# Patient Record
Sex: MALE | Race: WHITE | NOT HISPANIC OR LATINO | URBAN - METROPOLITAN AREA
[De-identification: names, ages, dates, MRNs, and addresses within clinical notes are randomized per-mention and may not be internally consistent; named-entity substitution may affect disease eponyms.]

---

## 2018-01-05 PROBLEM — Z00.00 ENCOUNTER FOR PREVENTIVE HEALTH EXAMINATION: Status: ACTIVE | Noted: 2018-01-05

## 2018-01-16 ENCOUNTER — OUTPATIENT (OUTPATIENT)
Dept: OUTPATIENT SERVICES | Facility: HOSPITAL | Age: 78
LOS: 1 days | End: 2018-01-16
Payer: MEDICARE

## 2018-01-16 VITALS
RESPIRATION RATE: 15 BRPM | DIASTOLIC BLOOD PRESSURE: 86 MMHG | HEART RATE: 72 BPM | WEIGHT: 199.08 LBS | HEIGHT: 68 IN | TEMPERATURE: 98 F | SYSTOLIC BLOOD PRESSURE: 159 MMHG

## 2018-01-16 DIAGNOSIS — Z41.9 ENCOUNTER FOR PROCEDURE FOR PURPOSES OTHER THAN REMEDYING HEALTH STATE, UNSPECIFIED: Chronic | ICD-10-CM

## 2018-01-16 DIAGNOSIS — Z01.818 ENCOUNTER FOR OTHER PREPROCEDURAL EXAMINATION: ICD-10-CM

## 2018-01-16 DIAGNOSIS — M16.11 UNILATERAL PRIMARY OSTEOARTHRITIS, RIGHT HIP: ICD-10-CM

## 2018-01-16 LAB
ALBUMIN SERPL ELPH-MCNC: 4 G/DL — SIGNIFICANT CHANGE UP (ref 3.3–5)
ALP SERPL-CCNC: 82 U/L — SIGNIFICANT CHANGE UP (ref 40–120)
ALT FLD-CCNC: 31 U/L — SIGNIFICANT CHANGE UP (ref 12–78)
ANION GAP SERPL CALC-SCNC: 7 MMOL/L — SIGNIFICANT CHANGE UP (ref 5–17)
APTT BLD: 35.4 SEC — SIGNIFICANT CHANGE UP (ref 27.5–37.4)
AST SERPL-CCNC: 26 U/L — SIGNIFICANT CHANGE UP (ref 15–37)
BILIRUB SERPL-MCNC: 0.5 MG/DL — SIGNIFICANT CHANGE UP (ref 0.2–1.2)
BUN SERPL-MCNC: 14 MG/DL — SIGNIFICANT CHANGE UP (ref 7–23)
CALCIUM SERPL-MCNC: 8.8 MG/DL — SIGNIFICANT CHANGE UP (ref 8.5–10.1)
CHLORIDE SERPL-SCNC: 107 MMOL/L — SIGNIFICANT CHANGE UP (ref 96–108)
CO2 SERPL-SCNC: 25 MMOL/L — SIGNIFICANT CHANGE UP (ref 22–31)
CREAT SERPL-MCNC: 0.85 MG/DL — SIGNIFICANT CHANGE UP (ref 0.5–1.3)
GLUCOSE SERPL-MCNC: 98 MG/DL — SIGNIFICANT CHANGE UP (ref 70–99)
HCT VFR BLD CALC: 34.3 % — LOW (ref 39–50)
HGB BLD-MCNC: 12.1 G/DL — LOW (ref 13–17)
INR BLD: 0.99 RATIO — SIGNIFICANT CHANGE UP (ref 0.88–1.16)
MCHC RBC-ENTMCNC: 33.8 PG — SIGNIFICANT CHANGE UP (ref 27–34)
MCHC RBC-ENTMCNC: 35.2 GM/DL — SIGNIFICANT CHANGE UP (ref 32–36)
MCV RBC AUTO: 96.1 FL — SIGNIFICANT CHANGE UP (ref 80–100)
PLATELET # BLD AUTO: 506 K/UL — HIGH (ref 150–400)
POTASSIUM SERPL-MCNC: 4.1 MMOL/L — SIGNIFICANT CHANGE UP (ref 3.5–5.3)
POTASSIUM SERPL-SCNC: 4.1 MMOL/L — SIGNIFICANT CHANGE UP (ref 3.5–5.3)
PROT SERPL-MCNC: 7.3 G/DL — SIGNIFICANT CHANGE UP (ref 6–8.3)
PROTHROM AB SERPL-ACNC: 10.8 SEC — SIGNIFICANT CHANGE UP (ref 9.8–12.7)
RBC # BLD: 3.57 M/UL — LOW (ref 4.2–5.8)
RBC # FLD: 13.6 % — SIGNIFICANT CHANGE UP (ref 10.3–14.5)
SODIUM SERPL-SCNC: 139 MMOL/L — SIGNIFICANT CHANGE UP (ref 135–145)
WBC # BLD: 5.4 K/UL — SIGNIFICANT CHANGE UP (ref 3.8–10.5)
WBC # FLD AUTO: 5.4 K/UL — SIGNIFICANT CHANGE UP (ref 3.8–10.5)

## 2018-01-16 PROCEDURE — 71046 X-RAY EXAM CHEST 2 VIEWS: CPT

## 2018-01-16 PROCEDURE — 85730 THROMBOPLASTIN TIME PARTIAL: CPT

## 2018-01-16 PROCEDURE — 93010 ELECTROCARDIOGRAM REPORT: CPT

## 2018-01-16 PROCEDURE — 86850 RBC ANTIBODY SCREEN: CPT

## 2018-01-16 PROCEDURE — 85027 COMPLETE CBC AUTOMATED: CPT

## 2018-01-16 PROCEDURE — 71046 X-RAY EXAM CHEST 2 VIEWS: CPT | Mod: 26

## 2018-01-16 PROCEDURE — 93005 ELECTROCARDIOGRAM TRACING: CPT

## 2018-01-16 PROCEDURE — 87641 MR-STAPH DNA AMP PROBE: CPT

## 2018-01-16 PROCEDURE — 86901 BLOOD TYPING SEROLOGIC RH(D): CPT

## 2018-01-16 PROCEDURE — 72190 X-RAY EXAM OF PELVIS: CPT

## 2018-01-16 PROCEDURE — G0463: CPT

## 2018-01-16 PROCEDURE — 86900 BLOOD TYPING SEROLOGIC ABO: CPT

## 2018-01-16 PROCEDURE — 73502 X-RAY EXAM HIP UNI 2-3 VIEWS: CPT

## 2018-01-16 PROCEDURE — 85610 PROTHROMBIN TIME: CPT

## 2018-01-16 PROCEDURE — 80053 COMPREHEN METABOLIC PANEL: CPT

## 2018-01-16 PROCEDURE — 87640 STAPH A DNA AMP PROBE: CPT

## 2018-01-16 PROCEDURE — 73502 X-RAY EXAM HIP UNI 2-3 VIEWS: CPT | Mod: 26,RT

## 2018-01-16 NOTE — H&P PST ADULT - PSH
Elective surgery  bilat cataract ext 2016  Elective surgery  bilat knee arthroscopy   1990's  Elective surgery  hernia repair  1990  Elective surgery  left shoulder 2000  Elective surgery  laminectomy  fusion   L5-S1   2017

## 2018-01-16 NOTE — H&P PST ADULT - NSANTHOSAYNRD_GEN_A_CORE
No. MOLINA screening performed.  STOP BANG Legend: 0-2 = LOW Risk; 3-4 = INTERMEDIATE Risk; 5-8 = HIGH Risk

## 2018-01-16 NOTE — H&P PST ADULT - PMH
Arthritis    Back pain    Colitis    Hiatal hernia    HTN (hypertension)    Hypercholesteremia    Hypothyroid    Reflux esophagitis

## 2018-01-17 LAB
MRSA PCR RESULT.: SIGNIFICANT CHANGE UP
S AUREUS DNA NOSE QL NAA+PROBE: DETECTED

## 2018-01-17 RX ORDER — MUPIROCIN 20 MG/G
1 OINTMENT TOPICAL
Qty: 1 | Refills: 0 | OUTPATIENT
Start: 2018-01-17 | End: 2018-01-21

## 2018-01-19 RX ORDER — SODIUM CHLORIDE 9 MG/ML
1000 INJECTION, SOLUTION INTRAVENOUS
Qty: 0 | Refills: 0 | Status: DISCONTINUED | OUTPATIENT
Start: 2018-01-22 | End: 2018-01-22

## 2018-01-22 ENCOUNTER — RESULT REVIEW (OUTPATIENT)
Age: 78
End: 2018-01-22

## 2018-01-22 ENCOUNTER — TRANSCRIPTION ENCOUNTER (OUTPATIENT)
Age: 78
End: 2018-01-22

## 2018-01-22 ENCOUNTER — INPATIENT (INPATIENT)
Facility: HOSPITAL | Age: 78
LOS: 1 days | Discharge: HOME | DRG: 470 | End: 2018-01-24
Attending: ORTHOPAEDIC SURGERY | Admitting: ORTHOPAEDIC SURGERY
Payer: MEDICARE

## 2018-01-22 VITALS
DIASTOLIC BLOOD PRESSURE: 87 MMHG | SYSTOLIC BLOOD PRESSURE: 143 MMHG | OXYGEN SATURATION: 98 % | TEMPERATURE: 98 F | WEIGHT: 199.08 LBS | HEIGHT: 68 IN | HEART RATE: 67 BPM | RESPIRATION RATE: 14 BRPM

## 2018-01-22 DIAGNOSIS — K52.9 NONINFECTIVE GASTROENTERITIS AND COLITIS, UNSPECIFIED: ICD-10-CM

## 2018-01-22 DIAGNOSIS — K21.0 GASTRO-ESOPHAGEAL REFLUX DISEASE WITH ESOPHAGITIS: ICD-10-CM

## 2018-01-22 DIAGNOSIS — Z41.9 ENCOUNTER FOR PROCEDURE FOR PURPOSES OTHER THAN REMEDYING HEALTH STATE, UNSPECIFIED: Chronic | ICD-10-CM

## 2018-01-22 DIAGNOSIS — E03.9 HYPOTHYROIDISM, UNSPECIFIED: ICD-10-CM

## 2018-01-22 DIAGNOSIS — I10 ESSENTIAL (PRIMARY) HYPERTENSION: ICD-10-CM

## 2018-01-22 DIAGNOSIS — M19.90 UNSPECIFIED OSTEOARTHRITIS, UNSPECIFIED SITE: ICD-10-CM

## 2018-01-22 DIAGNOSIS — M16.11 UNILATERAL PRIMARY OSTEOARTHRITIS, RIGHT HIP: ICD-10-CM

## 2018-01-22 LAB
ABO RH CONFIRMATION: SIGNIFICANT CHANGE UP
HCT VFR BLD CALC: 30.4 % — LOW (ref 39–50)
HGB BLD-MCNC: 10 G/DL — LOW (ref 13–17)

## 2018-01-22 PROCEDURE — 88305 TISSUE EXAM BY PATHOLOGIST: CPT | Mod: 26

## 2018-01-22 PROCEDURE — 73501 X-RAY EXAM HIP UNI 1 VIEW: CPT | Mod: 26,RT

## 2018-01-22 PROCEDURE — 88311 DECALCIFY TISSUE: CPT | Mod: 26

## 2018-01-22 RX ORDER — ACETAMINOPHEN 500 MG
1000 TABLET ORAL ONCE
Qty: 0 | Refills: 0 | Status: COMPLETED | OUTPATIENT
Start: 2018-01-22 | End: 2018-01-22

## 2018-01-22 RX ORDER — FOLIC ACID 0.8 MG
1 TABLET ORAL DAILY
Qty: 0 | Refills: 0 | Status: DISCONTINUED | OUTPATIENT
Start: 2018-01-22 | End: 2018-01-24

## 2018-01-22 RX ORDER — LEVOTHYROXINE SODIUM 125 MCG
25 TABLET ORAL DAILY
Qty: 0 | Refills: 0 | Status: DISCONTINUED | OUTPATIENT
Start: 2018-01-22 | End: 2018-01-24

## 2018-01-22 RX ORDER — HYDROMORPHONE HYDROCHLORIDE 2 MG/ML
4 INJECTION INTRAMUSCULAR; INTRAVENOUS; SUBCUTANEOUS EVERY 4 HOURS
Qty: 0 | Refills: 0 | Status: DISCONTINUED | OUTPATIENT
Start: 2018-01-22 | End: 2018-01-24

## 2018-01-22 RX ORDER — CELECOXIB 200 MG/1
200 CAPSULE ORAL
Qty: 0 | Refills: 0 | Status: DISCONTINUED | OUTPATIENT
Start: 2018-01-22 | End: 2018-01-24

## 2018-01-22 RX ORDER — HYDROMORPHONE HYDROCHLORIDE 2 MG/ML
2 INJECTION INTRAMUSCULAR; INTRAVENOUS; SUBCUTANEOUS EVERY 4 HOURS
Qty: 0 | Refills: 0 | Status: DISCONTINUED | OUTPATIENT
Start: 2018-01-22 | End: 2018-01-24

## 2018-01-22 RX ORDER — LOSARTAN POTASSIUM 100 MG/1
100 TABLET, FILM COATED ORAL DAILY
Qty: 0 | Refills: 0 | Status: DISCONTINUED | OUTPATIENT
Start: 2018-01-22 | End: 2018-01-24

## 2018-01-22 RX ORDER — DOCUSATE SODIUM 100 MG
100 CAPSULE ORAL THREE TIMES A DAY
Qty: 0 | Refills: 0 | Status: DISCONTINUED | OUTPATIENT
Start: 2018-01-22 | End: 2018-01-24

## 2018-01-22 RX ORDER — AMLODIPINE BESYLATE 2.5 MG/1
10 TABLET ORAL DAILY
Qty: 0 | Refills: 0 | Status: DISCONTINUED | OUTPATIENT
Start: 2018-01-22 | End: 2018-01-24

## 2018-01-22 RX ORDER — BUPROPION HYDROCHLORIDE 150 MG/1
200 TABLET, EXTENDED RELEASE ORAL DAILY
Qty: 0 | Refills: 0 | Status: DISCONTINUED | OUTPATIENT
Start: 2018-01-22 | End: 2018-01-22

## 2018-01-22 RX ORDER — BUPROPION HYDROCHLORIDE 150 MG/1
100 TABLET, EXTENDED RELEASE ORAL
Qty: 0 | Refills: 0 | Status: DISCONTINUED | OUTPATIENT
Start: 2018-01-22 | End: 2018-01-24

## 2018-01-22 RX ORDER — FENOFIBRATE,MICRONIZED 130 MG
145 CAPSULE ORAL DAILY
Qty: 0 | Refills: 0 | Status: DISCONTINUED | OUTPATIENT
Start: 2018-01-22 | End: 2018-01-24

## 2018-01-22 RX ORDER — FERROUS SULFATE 325(65) MG
325 TABLET ORAL
Qty: 0 | Refills: 0 | Status: DISCONTINUED | OUTPATIENT
Start: 2018-01-22 | End: 2018-01-24

## 2018-01-22 RX ORDER — HYDROMORPHONE HYDROCHLORIDE 2 MG/ML
0.5 INJECTION INTRAMUSCULAR; INTRAVENOUS; SUBCUTANEOUS
Qty: 0 | Refills: 0 | Status: DISCONTINUED | OUTPATIENT
Start: 2018-01-22 | End: 2018-01-22

## 2018-01-22 RX ORDER — MERCAPTOPURINE 50 MG/1
100 TABLET ORAL DAILY
Qty: 0 | Refills: 0 | Status: COMPLETED | OUTPATIENT
Start: 2018-01-22 | End: 2018-01-22

## 2018-01-22 RX ORDER — ASPIRIN/CALCIUM CARB/MAGNESIUM 324 MG
325 TABLET ORAL
Qty: 0 | Refills: 0 | Status: DISCONTINUED | OUTPATIENT
Start: 2018-01-22 | End: 2018-01-24

## 2018-01-22 RX ORDER — SODIUM CHLORIDE 9 MG/ML
1000 INJECTION, SOLUTION INTRAVENOUS
Qty: 0 | Refills: 0 | Status: DISCONTINUED | OUTPATIENT
Start: 2018-01-22 | End: 2018-01-23

## 2018-01-22 RX ORDER — PANTOPRAZOLE SODIUM 20 MG/1
40 TABLET, DELAYED RELEASE ORAL DAILY
Qty: 0 | Refills: 0 | Status: DISCONTINUED | OUTPATIENT
Start: 2018-01-22 | End: 2018-01-24

## 2018-01-22 RX ORDER — MORPHINE SULFATE 50 MG/1
2 CAPSULE, EXTENDED RELEASE ORAL EVERY 4 HOURS
Qty: 0 | Refills: 0 | Status: DISCONTINUED | OUTPATIENT
Start: 2018-01-22 | End: 2018-01-24

## 2018-01-22 RX ORDER — ONDANSETRON 8 MG/1
4 TABLET, FILM COATED ORAL EVERY 6 HOURS
Qty: 0 | Refills: 0 | Status: DISCONTINUED | OUTPATIENT
Start: 2018-01-22 | End: 2018-01-24

## 2018-01-22 RX ORDER — CEFAZOLIN SODIUM 1 G
2000 VIAL (EA) INJECTION ONCE
Qty: 0 | Refills: 0 | Status: COMPLETED | OUTPATIENT
Start: 2018-01-22 | End: 2018-01-22

## 2018-01-22 RX ORDER — ACETAMINOPHEN 500 MG
1000 TABLET ORAL ONCE
Qty: 0 | Refills: 0 | Status: COMPLETED | OUTPATIENT
Start: 2018-01-23 | End: 2018-01-23

## 2018-01-22 RX ORDER — ASCORBIC ACID 60 MG
500 TABLET,CHEWABLE ORAL
Qty: 0 | Refills: 0 | Status: DISCONTINUED | OUTPATIENT
Start: 2018-01-22 | End: 2018-01-24

## 2018-01-22 RX ORDER — SODIUM CHLORIDE 9 MG/ML
1000 INJECTION, SOLUTION INTRAVENOUS
Qty: 0 | Refills: 0 | Status: DISCONTINUED | OUTPATIENT
Start: 2018-01-22 | End: 2018-01-22

## 2018-01-22 RX ORDER — CEFAZOLIN SODIUM 1 G
2000 VIAL (EA) INJECTION EVERY 8 HOURS
Qty: 0 | Refills: 0 | Status: COMPLETED | OUTPATIENT
Start: 2018-01-22 | End: 2018-01-23

## 2018-01-22 RX ORDER — BUPIVACAINE 13.3 MG/ML
20 INJECTION, SUSPENSION, LIPOSOMAL INFILTRATION ONCE
Qty: 0 | Refills: 0 | Status: DISCONTINUED | OUTPATIENT
Start: 2018-01-22 | End: 2018-01-22

## 2018-01-22 RX ORDER — ACETAMINOPHEN 500 MG
650 TABLET ORAL EVERY 8 HOURS
Qty: 0 | Refills: 0 | Status: DISCONTINUED | OUTPATIENT
Start: 2018-01-23 | End: 2018-01-24

## 2018-01-22 RX ADMIN — HYDROMORPHONE HYDROCHLORIDE 0.5 MILLIGRAM(S): 2 INJECTION INTRAMUSCULAR; INTRAVENOUS; SUBCUTANEOUS at 13:19

## 2018-01-22 RX ADMIN — ONDANSETRON 4 MILLIGRAM(S): 8 TABLET, FILM COATED ORAL at 17:23

## 2018-01-22 RX ADMIN — Medication 100 MILLIGRAM(S): at 17:23

## 2018-01-22 RX ADMIN — SODIUM CHLORIDE 100 MILLILITER(S): 9 INJECTION, SOLUTION INTRAVENOUS at 13:04

## 2018-01-22 RX ADMIN — HYDROMORPHONE HYDROCHLORIDE 4 MILLIGRAM(S): 2 INJECTION INTRAMUSCULAR; INTRAVENOUS; SUBCUTANEOUS at 20:50

## 2018-01-22 RX ADMIN — HYDROMORPHONE HYDROCHLORIDE 0.5 MILLIGRAM(S): 2 INJECTION INTRAMUSCULAR; INTRAVENOUS; SUBCUTANEOUS at 12:41

## 2018-01-22 RX ADMIN — CELECOXIB 200 MILLIGRAM(S): 200 CAPSULE ORAL at 19:54

## 2018-01-22 RX ADMIN — HYDROMORPHONE HYDROCHLORIDE 0.5 MILLIGRAM(S): 2 INJECTION INTRAMUSCULAR; INTRAVENOUS; SUBCUTANEOUS at 13:09

## 2018-01-22 RX ADMIN — HYDROMORPHONE HYDROCHLORIDE 4 MILLIGRAM(S): 2 INJECTION INTRAMUSCULAR; INTRAVENOUS; SUBCUTANEOUS at 20:05

## 2018-01-22 RX ADMIN — Medication 100 MILLIGRAM(S): at 22:05

## 2018-01-22 RX ADMIN — HYDROMORPHONE HYDROCHLORIDE 0.5 MILLIGRAM(S): 2 INJECTION INTRAMUSCULAR; INTRAVENOUS; SUBCUTANEOUS at 12:53

## 2018-01-22 RX ADMIN — SODIUM CHLORIDE 75 MILLILITER(S): 9 INJECTION, SOLUTION INTRAVENOUS at 14:44

## 2018-01-22 RX ADMIN — Medication 400 MILLIGRAM(S): at 18:31

## 2018-01-22 RX ADMIN — Medication 1000 MILLIGRAM(S): at 19:54

## 2018-01-22 RX ADMIN — SODIUM CHLORIDE 75 MILLILITER(S): 9 INJECTION, SOLUTION INTRAVENOUS at 09:14

## 2018-01-22 RX ADMIN — Medication 325 MILLIGRAM(S): at 18:23

## 2018-01-22 RX ADMIN — CELECOXIB 200 MILLIGRAM(S): 200 CAPSULE ORAL at 18:23

## 2018-01-22 NOTE — BRIEF OPERATIVE NOTE - PROCEDURE
<<-----Click on this checkbox to enter Procedure RAMAKRISHNA (total hip arthroplasty)  01/22/2018    Active  [id number]

## 2018-01-22 NOTE — PROGRESS NOTE ADULT - ASSESSMENT
77M s/p R RAMAKRISHNA POD 0  Analgesia  DVT ppx  WBAT  PT/OOB  Incentive spirometry  Abd pillow  DC planning

## 2018-01-22 NOTE — ASU PREOP CHECKLIST - DNR CLARIFICATION FORM COMPLETED
Afib    CHF (congestive heart failure)    COPD (chronic obstructive pulmonary disease)    Essential hypertension  Hypertension  Hyperlipidemia  HLD (hyperlipidemia)
n/a

## 2018-01-22 NOTE — CONSULT NOTE ADULT - SUBJECTIVE AND OBJECTIVE BOX
Patient is a 77y old  Male who presents with a chief complaint of r thr  feels well presently  denies chest pain, denies shortness of breath    INTERVAL HPI/OVERNIGHT EVENTS:  T(C): 36.6 (01-22-18 @ 20:11), Max: 37 (01-22-18 @ 14:27)  HR: 64 (01-22-18 @ 20:11) (62 - 76)  BP: 117/58 (01-22-18 @ 20:11) (103/52 - 143/87)  RR: 18 (01-22-18 @ 20:11) (13 - 18)  SpO2: 95% (01-22-18 @ 20:11) (94% - 98%)  Wt(kg): --  I&O's Summary    22 Jan 2018 07:01  -  22 Jan 2018 22:24  --------------------------------------------------------  IN: 875 mL / OUT: 0 mL / NET: 875 mL        PAST MEDICAL & SURGICAL HISTORY:  Hiatal hernia  Back pain  Arthritis  Hypothyroid  Reflux esophagitis  Hypercholesteremia  HTN (hypertension)  Colitis  Elective surgery: bilat cataract ext 2016  Elective surgery: bilat knee arthroscopy   1990&#x27;s  Elective surgery: hernia repair  1990  Elective surgery: left shoulder 2000  Elective surgery: laminectomy  fusion   L5-S1   2017      SOCIAL HISTORY  Alcohol: none  Tobacco: quit 50 yrs ago  Illicit substance use: denies      FAMILY HISTORY:    Home Medications:  amLODIPine 10 mg oral tablet: 1 tab(s) orally once a day (22 Jan 2018 08:49)  fenofibrate 145 mg oral tablet: 1 tab(s) orally once a day (22 Jan 2018 08:49)  levothyroxine 25 mcg (0.025 mg) oral tablet: 1 tab(s) orally once a day (22 Jan 2018 08:49)  losartan 100 mg oral tablet: 1 tab(s) orally once a day (22 Jan 2018 08:49)  mercaptopurine 50 mg oral tablet: 2 tab(s) orally once a day (22 Jan 2018 08:49)  omeprazole 40 mg oral delayed release capsule: 1 cap(s) orally once a day (22 Jan 2018 08:49)  Wellbutrin 100 mg oral tablet: 1 tab(s) orally 2 times a day (22 Jan 2018 08:49)        LABS:                        10.0   x     )-----------( x        ( 22 Jan 2018 13:29 )             30.4               CAPILLARY BLOOD GLUCOSE                MEDICATIONS  (STANDING):  amLODIPine   Tablet 10 milliGRAM(s) Oral daily  ascorbic acid 500 milliGRAM(s) Oral two times a day  aspirin enteric coated 325 milliGRAM(s) Oral two times a day  buPROPion . 100 milliGRAM(s) Oral two times a day  ceFAZolin   IVPB 2000 milliGRAM(s) IV Intermittent every 8 hours  celecoxib 200 milliGRAM(s) Oral two times a day after meals  docusate sodium 100 milliGRAM(s) Oral three times a day  fenofibrate Tablet 145 milliGRAM(s) Oral daily  ferrous    sulfate 325 milliGRAM(s) Oral three times a day with meals  folic acid 1 milliGRAM(s) Oral daily  lactated ringers. 1000 milliLiter(s) (75 mL/Hr) IV Continuous <Continuous>  levothyroxine 25 MICROGram(s) Oral daily  losartan 100 milliGRAM(s) Oral daily  multivitamin 1 Tablet(s) Oral daily  pantoprazole    Tablet 40 milliGRAM(s) Oral daily    MEDICATIONS  (PRN):  HYDROmorphone   Tablet 2 milliGRAM(s) Oral every 4 hours PRN Moderate Pain (4 - 6)  HYDROmorphone   Tablet 4 milliGRAM(s) Oral every 4 hours PRN Severe Pain (7 - 10)  morphine  - Injectable 2 milliGRAM(s) IV Push every 4 hours PRN Severe Pain (7 - 10)  ondansetron Injectable 4 milliGRAM(s) IV Push every 6 hours PRN Nausea and/or Vomiting      REVIEW OF SYSTEMS:  CONSTITUTIONAL: No fever, weight loss, or fatigue  EYES: No eye pain, visual disturbances, or discharge  ENMT:  No difficulty hearing, tinnitus, vertigo; No sinus or throat pain  NECK: No pain or stiffness  RESPIRATORY: No cough, wheezing, chills or hemoptysis; No shortness of breath  CARDIOVASCULAR: No chest pain, palpitations, dizziness, or leg swelling  GASTROINTESTINAL: No abdominal or epigastric pain. No nausea, vomiting, or hematemesis; No diarrhea or constipation. No melena or hematochezia.  GENITOURINARY: No dysuria, frequency, hematuria, or incontinence  NEUROLOGICAL: No headaches, memory loss, loss of strength, numbness, or tremors  SKIN: No itching, burning, rashes, or lesions   LYMPH NODES: No enlarged glands  ENDOCRINE: No heat or cold intolerance; No hair loss  MUSCULOSKELETAL: No joint pain or swelling; No muscle, back, or extremity pain  PSYCHIATRIC: No depression, anxiety, mood swings, or difficulty sleeping  HEME/LYMPH: No easy bruising, or bleeding gums  ALLERY AND IMMUNOLOGIC: No hives or eczema    RADIOLOGY & ADDITIONAL TESTS:    Imaging Personally Reviewed:  [y ] YES  [ ] NO    Consultant(s) Notes Reviewed:  [y ] YES  [ ] NO        PHYSICAL EXAM:  GENERAL: NAD, well-groomed, well-developed  HEAD:  Atraumatic, Normocephalic  EYES: EOMI, PERRLA, conjunctiva and sclera clear  ENMT: No tonsillar erythema, exudates, or enlargement; Moist mucous membranes, Good dentition, No lesions  NECK: Supple, No JVD, Normal thyroid  NERVOUS SYSTEM:  Alert & Oriented X3, Good concentration; Motor Strength 5/5 B/L upper and lower extremities; DTRs 2+ intact and symmetric  CHEST/LUNG: Clear to percussion bilaterally; No rales, rhonchi, wheezing, or rubs  HEART: Regular rate and rhythm; No murmurs, rubs, or gallops  ABDOMEN: Soft, Nontender, Nondistended; Bowel sounds present  EXTREMITIES:  2+ Peripheral Pulses, No clubbing, cyanosis, or edema  LYMPH: No lymphadenopathy noted  SKIN: No rashes or lesions    Care Discussed with Consultants/Other Providers [ ] YES  [ ] NO

## 2018-01-23 ENCOUNTER — TRANSCRIPTION ENCOUNTER (OUTPATIENT)
Age: 78
End: 2018-01-23

## 2018-01-23 DIAGNOSIS — D50.0 IRON DEFICIENCY ANEMIA SECONDARY TO BLOOD LOSS (CHRONIC): ICD-10-CM

## 2018-01-23 DIAGNOSIS — M16.11 UNILATERAL PRIMARY OSTEOARTHRITIS, RIGHT HIP: ICD-10-CM

## 2018-01-23 LAB
ANION GAP SERPL CALC-SCNC: 9 MMOL/L — SIGNIFICANT CHANGE UP (ref 5–17)
BUN SERPL-MCNC: 9 MG/DL — SIGNIFICANT CHANGE UP (ref 7–23)
CALCIUM SERPL-MCNC: 8.1 MG/DL — LOW (ref 8.5–10.1)
CHLORIDE SERPL-SCNC: 107 MMOL/L — SIGNIFICANT CHANGE UP (ref 96–108)
CO2 SERPL-SCNC: 27 MMOL/L — SIGNIFICANT CHANGE UP (ref 22–31)
CREAT SERPL-MCNC: 0.84 MG/DL — SIGNIFICANT CHANGE UP (ref 0.5–1.3)
GLUCOSE SERPL-MCNC: 113 MG/DL — HIGH (ref 70–99)
HCT VFR BLD CALC: 29.2 % — LOW (ref 39–50)
HGB BLD-MCNC: 9.6 G/DL — LOW (ref 13–17)
POTASSIUM SERPL-MCNC: 3.7 MMOL/L — SIGNIFICANT CHANGE UP (ref 3.5–5.3)
POTASSIUM SERPL-SCNC: 3.7 MMOL/L — SIGNIFICANT CHANGE UP (ref 3.5–5.3)
SODIUM SERPL-SCNC: 143 MMOL/L — SIGNIFICANT CHANGE UP (ref 135–145)

## 2018-01-23 RX ORDER — MERCAPTOPURINE 50 MG/1
100 TABLET ORAL DAILY
Qty: 0 | Refills: 0 | Status: DISCONTINUED | OUTPATIENT
Start: 2018-01-23 | End: 2018-01-24

## 2018-01-23 RX ADMIN — Medication 1 TABLET(S): at 13:39

## 2018-01-23 RX ADMIN — Medication 25 MICROGRAM(S): at 05:34

## 2018-01-23 RX ADMIN — Medication 325 MILLIGRAM(S): at 05:33

## 2018-01-23 RX ADMIN — LOSARTAN POTASSIUM 100 MILLIGRAM(S): 100 TABLET, FILM COATED ORAL at 05:33

## 2018-01-23 RX ADMIN — Medication 145 MILLIGRAM(S): at 13:39

## 2018-01-23 RX ADMIN — Medication 100 MILLIGRAM(S): at 05:34

## 2018-01-23 RX ADMIN — AMLODIPINE BESYLATE 10 MILLIGRAM(S): 2.5 TABLET ORAL at 05:34

## 2018-01-23 RX ADMIN — Medication 325 MILLIGRAM(S): at 18:23

## 2018-01-23 RX ADMIN — Medication 650 MILLIGRAM(S): at 21:04

## 2018-01-23 RX ADMIN — HYDROMORPHONE HYDROCHLORIDE 4 MILLIGRAM(S): 2 INJECTION INTRAMUSCULAR; INTRAVENOUS; SUBCUTANEOUS at 10:40

## 2018-01-23 RX ADMIN — HYDROMORPHONE HYDROCHLORIDE 4 MILLIGRAM(S): 2 INJECTION INTRAMUSCULAR; INTRAVENOUS; SUBCUTANEOUS at 23:06

## 2018-01-23 RX ADMIN — Medication 1000 MILLIGRAM(S): at 04:00

## 2018-01-23 RX ADMIN — CELECOXIB 200 MILLIGRAM(S): 200 CAPSULE ORAL at 19:03

## 2018-01-23 RX ADMIN — CELECOXIB 200 MILLIGRAM(S): 200 CAPSULE ORAL at 10:36

## 2018-01-23 RX ADMIN — Medication 500 MILLIGRAM(S): at 18:23

## 2018-01-23 RX ADMIN — Medication 100 MILLIGRAM(S): at 01:27

## 2018-01-23 RX ADMIN — BUPROPION HYDROCHLORIDE 100 MILLIGRAM(S): 150 TABLET, EXTENDED RELEASE ORAL at 19:03

## 2018-01-23 RX ADMIN — Medication 100 MILLIGRAM(S): at 13:39

## 2018-01-23 RX ADMIN — Medication 500 MILLIGRAM(S): at 05:34

## 2018-01-23 RX ADMIN — Medication 100 MILLIGRAM(S): at 21:04

## 2018-01-23 RX ADMIN — Medication 325 MILLIGRAM(S): at 18:24

## 2018-01-23 RX ADMIN — Medication 1 MILLIGRAM(S): at 13:40

## 2018-01-23 RX ADMIN — HYDROMORPHONE HYDROCHLORIDE 4 MILLIGRAM(S): 2 INJECTION INTRAMUSCULAR; INTRAVENOUS; SUBCUTANEOUS at 11:40

## 2018-01-23 RX ADMIN — Medication 400 MILLIGRAM(S): at 03:13

## 2018-01-23 RX ADMIN — Medication 325 MILLIGRAM(S): at 13:39

## 2018-01-23 RX ADMIN — MERCAPTOPURINE 100 MILLIGRAM(S): 50 TABLET ORAL at 18:15

## 2018-01-23 RX ADMIN — HYDROMORPHONE HYDROCHLORIDE 4 MILLIGRAM(S): 2 INJECTION INTRAMUSCULAR; INTRAVENOUS; SUBCUTANEOUS at 19:24

## 2018-01-23 RX ADMIN — CELECOXIB 200 MILLIGRAM(S): 200 CAPSULE ORAL at 11:40

## 2018-01-23 RX ADMIN — Medication 325 MILLIGRAM(S): at 08:16

## 2018-01-23 RX ADMIN — HYDROMORPHONE HYDROCHLORIDE 4 MILLIGRAM(S): 2 INJECTION INTRAMUSCULAR; INTRAVENOUS; SUBCUTANEOUS at 02:20

## 2018-01-23 RX ADMIN — Medication 650 MILLIGRAM(S): at 05:34

## 2018-01-23 RX ADMIN — HYDROMORPHONE HYDROCHLORIDE 4 MILLIGRAM(S): 2 INJECTION INTRAMUSCULAR; INTRAVENOUS; SUBCUTANEOUS at 01:27

## 2018-01-23 RX ADMIN — Medication 650 MILLIGRAM(S): at 13:39

## 2018-01-23 RX ADMIN — CELECOXIB 200 MILLIGRAM(S): 200 CAPSULE ORAL at 18:23

## 2018-01-23 RX ADMIN — HYDROMORPHONE HYDROCHLORIDE 4 MILLIGRAM(S): 2 INJECTION INTRAMUSCULAR; INTRAVENOUS; SUBCUTANEOUS at 18:24

## 2018-01-23 RX ADMIN — PANTOPRAZOLE SODIUM 40 MILLIGRAM(S): 20 TABLET, DELAYED RELEASE ORAL at 13:39

## 2018-01-23 NOTE — DISCHARGE NOTE ADULT - PATIENT PORTAL LINK FT
“You can access the Follow[employer] Patient Portal, offered by [facility], by registering with the following website: [email or web address]”

## 2018-01-23 NOTE — DISCHARGE NOTE ADULT - PLAN OF CARE
RECOVER FROM SURGERY, PHYSICAL THERAPY WEIGHT BEARING AS TOLERATED.  FOLLOW UP WITH DR. WALL AFTER NEXT THURSDAY 02/01/2018 CALL [phone] FOR AN APPOINTMENT.  KEEP HIP AQUACEL  DRESSING  ON DRY AND CLEAN, IT WILL BE CHANGED OR REMOVED ON YOUR NEXT OFFICE VISIT .

## 2018-01-23 NOTE — DISCHARGE NOTE ADULT - MEDICATION SUMMARY - MEDICATIONS TO TAKE
I will START or STAY ON the medications listed below when I get home from the hospital:    celecoxib 200 mg oral capsule  -- 1 cap(s) by mouth 2 times a day (after meals)  -- Indication: For PAIN    HYDROmorphone 2 mg oral tablet  -- 1 tab(s) by mouth every 6 hours MDD:4  -- Indication: For PAIN    Aspirin Enteric Coated 325 mg oral delayed release tablet  -- 1 tab(s) by mouth 2 times a day   -- Indication: For DVT PROPHYLAXIS FOR 30 DAYS     losartan 100 mg oral tablet  -- 1 tab(s) by mouth once a day  -- Indication: For HOME MEDICATION     fenofibrate 145 mg oral tablet  -- 1 tab(s) by mouth once a day  -- Indication: For HOME MEDICATION     mercaptopurine 50 mg oral tablet  -- 2 tab(s) by mouth once a day  -- Indication: For HOME MEDICATION     amLODIPine 10 mg oral tablet  -- 1 tab(s) by mouth once a day  -- Indication: For HOME MEDICATION     omeprazole 40 mg oral delayed release capsule  -- 1 cap(s) by mouth once a day  -- Indication: For HOME MEDICATION     Wellbutrin 100 mg oral tablet  -- 1 tab(s) by mouth 2 times a day  -- Indication: For HOME MEDICATION     levothyroxine 25 mcg (0.025 mg) oral tablet  -- 1 tab(s) by mouth once a day  -- Indication: For HOME MEDICATION     levothyroxine 200 mcg (0.2 mg) oral tablet  -- 1 tab(s) by mouth once a day takes total of 225 mcg daily  -- Indication: For HOME MEDICATION

## 2018-01-23 NOTE — DISCHARGE NOTE ADULT - CARE PROVIDER_API CALL
Marcus Ambrose), Orthopaedic Surgery  [address] [place] [address]  Phone: [phone]  Fax: [phone]

## 2018-01-23 NOTE — DISCHARGE NOTE ADULT - HOSPITAL COURSE
THIS IS A CASE OF A 78 YO MALE EVALUATED IN THE OFFICE DUE TO RIGHT HIP PAIN.    PAST MEDICAL HISTORY:     HOSPITAL COURSE: AFTER THE RISK AND BENEFITS OF SURGICAL INTERVENTION IN DETAILS WERE DISCUSSED WITH THE PATIENT, A CONSENT WAS OBTAINED. AFTER OBTAINING MEDICAL CLEARANCE AND PREOPERATIVE EVALUATION, THE PATIENT WAS TAKEN TO THE OPERATING ROOM ON 01/22/2018 AND THE PATIENT UNDERWENT A  RIGHT TOTAL HIP REPLACEMENT. POSTOPERATIVE PHASE, THE PATIENT WAS ANTICOAGULATED WITH ASPIRIN  AND WAS GIVEN 24 HOURS OF IV ANTIBIOTICS. A SOCIAL SERVICE CONSULT WAS OBTAINED FOR DISCHARGE PLANNING.  A PHYSICAL THERAPY CONSULT WAS OBTAINED FOR WEIGHT BEARING AS TOLERATED, HIP PRECAUTIONS.  DUE TO ANEMIA OF ACUTE BLOOD LOSS POST OP IRON SUPPLEMENT GIVEN.    DISPOSITION : HOME WITH HOME CARE  AND FOLLOW UP WITH DR. WALL AS OUTPATIENT.

## 2018-01-23 NOTE — DISCHARGE NOTE ADULT - MEDICATION SUMMARY - MEDICATIONS TO STOP TAKING
I will STOP taking the medications listed below when I get home from the hospital:    mupirocin 2% topical ointment  -- Apply in each nostril 2 times per day with a clean Q tip   -- For external use only.

## 2018-01-23 NOTE — DISCHARGE NOTE ADULT - CARE PLAN
Principal Discharge DX:	Osteoarthritis of right hip, unspecified osteoarthritis type Principal Discharge DX:	Osteoarthritis of right hip, unspecified osteoarthritis type  Goal:	RECOVER FROM SURGERY, PHYSICAL THERAPY  Assessment and plan of treatment:	WEIGHT BEARING AS TOLERATED.  FOLLOW UP WITH DR. WALL AFTER NEXT THURSDAY 02/01/2018 CALL [phone] FOR AN APPOINTMENT.  KEEP HIP AQUACEL  DRESSING  ON DRY AND CLEAN, IT WILL BE CHANGED OR REMOVED ON YOUR NEXT OFFICE VISIT .

## 2018-01-24 VITALS — TEMPERATURE: 97 F

## 2018-01-24 LAB
HCT VFR BLD CALC: 27.7 % — LOW (ref 39–50)
HGB BLD-MCNC: 9.1 G/DL — LOW (ref 13–17)
SURGICAL PATHOLOGY FINAL REPORT - CH: SIGNIFICANT CHANGE UP

## 2018-01-24 PROCEDURE — 73501 X-RAY EXAM HIP UNI 1 VIEW: CPT

## 2018-01-24 PROCEDURE — 88311 DECALCIFY TISSUE: CPT

## 2018-01-24 PROCEDURE — 97162 PT EVAL MOD COMPLEX 30 MIN: CPT

## 2018-01-24 PROCEDURE — 97530 THERAPEUTIC ACTIVITIES: CPT

## 2018-01-24 PROCEDURE — 97112 NEUROMUSCULAR REEDUCATION: CPT

## 2018-01-24 PROCEDURE — C1713: CPT

## 2018-01-24 PROCEDURE — C1776: CPT

## 2018-01-24 PROCEDURE — 97116 GAIT TRAINING THERAPY: CPT

## 2018-01-24 PROCEDURE — 80048 BASIC METABOLIC PNL TOTAL CA: CPT

## 2018-01-24 PROCEDURE — 88305 TISSUE EXAM BY PATHOLOGIST: CPT

## 2018-01-24 PROCEDURE — 85018 HEMOGLOBIN: CPT

## 2018-01-24 RX ORDER — HYDROMORPHONE HYDROCHLORIDE 2 MG/ML
1 INJECTION INTRAMUSCULAR; INTRAVENOUS; SUBCUTANEOUS
Qty: 28 | Refills: 0 | OUTPATIENT
Start: 2018-01-24 | End: 2018-01-30

## 2018-01-24 RX ORDER — LEVOTHYROXINE SODIUM 125 MCG
200 TABLET ORAL DAILY
Qty: 0 | Refills: 0 | Status: DISCONTINUED | OUTPATIENT
Start: 2018-01-24 | End: 2018-01-24

## 2018-01-24 RX ORDER — ASPIRIN/CALCIUM CARB/MAGNESIUM 324 MG
1 TABLET ORAL
Qty: 60 | Refills: 0 | OUTPATIENT
Start: 2018-01-24 | End: 2018-02-22

## 2018-01-24 RX ORDER — CELECOXIB 200 MG/1
1 CAPSULE ORAL
Qty: 0 | Refills: 0 | COMMUNITY
Start: 2018-01-24

## 2018-01-24 RX ADMIN — Medication 1 TABLET(S): at 11:14

## 2018-01-24 RX ADMIN — ONDANSETRON 4 MILLIGRAM(S): 8 TABLET, FILM COATED ORAL at 13:44

## 2018-01-24 RX ADMIN — Medication 325 MILLIGRAM(S): at 08:54

## 2018-01-24 RX ADMIN — HYDROMORPHONE HYDROCHLORIDE 4 MILLIGRAM(S): 2 INJECTION INTRAMUSCULAR; INTRAVENOUS; SUBCUTANEOUS at 04:30

## 2018-01-24 RX ADMIN — HYDROMORPHONE HYDROCHLORIDE 4 MILLIGRAM(S): 2 INJECTION INTRAMUSCULAR; INTRAVENOUS; SUBCUTANEOUS at 11:21

## 2018-01-24 RX ADMIN — Medication 650 MILLIGRAM(S): at 06:46

## 2018-01-24 RX ADMIN — Medication 100 MILLIGRAM(S): at 13:22

## 2018-01-24 RX ADMIN — BUPROPION HYDROCHLORIDE 100 MILLIGRAM(S): 150 TABLET, EXTENDED RELEASE ORAL at 06:47

## 2018-01-24 RX ADMIN — Medication 325 MILLIGRAM(S): at 06:47

## 2018-01-24 RX ADMIN — Medication 650 MILLIGRAM(S): at 13:22

## 2018-01-24 RX ADMIN — PANTOPRAZOLE SODIUM 40 MILLIGRAM(S): 20 TABLET, DELAYED RELEASE ORAL at 11:14

## 2018-01-24 RX ADMIN — Medication 1 MILLIGRAM(S): at 11:14

## 2018-01-24 RX ADMIN — HYDROMORPHONE HYDROCHLORIDE 4 MILLIGRAM(S): 2 INJECTION INTRAMUSCULAR; INTRAVENOUS; SUBCUTANEOUS at 03:30

## 2018-01-24 RX ADMIN — HYDROMORPHONE HYDROCHLORIDE 4 MILLIGRAM(S): 2 INJECTION INTRAMUSCULAR; INTRAVENOUS; SUBCUTANEOUS at 13:19

## 2018-01-24 RX ADMIN — MERCAPTOPURINE 100 MILLIGRAM(S): 50 TABLET ORAL at 11:15

## 2018-01-24 RX ADMIN — Medication 100 MILLIGRAM(S): at 06:47

## 2018-01-24 RX ADMIN — AMLODIPINE BESYLATE 10 MILLIGRAM(S): 2.5 TABLET ORAL at 06:47

## 2018-01-24 RX ADMIN — Medication 25 MICROGRAM(S): at 06:46

## 2018-01-24 RX ADMIN — HYDROMORPHONE HYDROCHLORIDE 4 MILLIGRAM(S): 2 INJECTION INTRAMUSCULAR; INTRAVENOUS; SUBCUTANEOUS at 00:06

## 2018-01-24 RX ADMIN — Medication 325 MILLIGRAM(S): at 11:23

## 2018-01-24 RX ADMIN — CELECOXIB 200 MILLIGRAM(S): 200 CAPSULE ORAL at 08:54

## 2018-01-24 RX ADMIN — Medication 145 MILLIGRAM(S): at 11:14

## 2018-01-24 RX ADMIN — CELECOXIB 200 MILLIGRAM(S): 200 CAPSULE ORAL at 11:10

## 2018-01-24 RX ADMIN — LOSARTAN POTASSIUM 100 MILLIGRAM(S): 100 TABLET, FILM COATED ORAL at 06:47

## 2018-01-24 RX ADMIN — Medication 500 MILLIGRAM(S): at 06:47

## 2018-01-24 NOTE — PROGRESS NOTE ADULT - SUBJECTIVE AND OBJECTIVE BOX
SATNAMGRETA CANDELARIO      77y   male  [id number]    ORTHOPEDIC SURGERY / DR. WALL    POD # 1    Vital Signs Last 24 Hrs  T(C): 37.4 (23 Jan 2018 04:10), Max: 37.4 (23 Jan 2018 04:10)  T(F): 99.3 (23 Jan 2018 04:10), Max: 99.3 (23 Jan 2018 04:10)  HR: 75 (23 Jan 2018 04:10) (62 - 76)  BP: 129/69 (23 Jan 2018 04:10) (103/52 - 146/69)  BP(mean): --  RR: 16 (23 Jan 2018 04:10) (13 - 18)  SpO2: 96% (23 Jan 2018 04:10) (94% - 98%)    RIGHT HIP :    DRESSING DRY AND INTACT  GOOD MOTOR TO RIGHT LOWER EXTREMITY  NEURO-VASCULAR STATUS INTACT  NO CALF TENDERNESS    Hemoglobin: 9.6 (01-23 @ 05:26)  Hemoglobin: 10.0 (01-22 @ 13:29)    Hematocrit: 29.2 (01-23 @ 05:26)  Hematocrit: 30.4 (01-22 @ 13:29)    01-23    143  |  107  |  9   ----------------------------<  113<H>  3.7   |  27  |  0.84    Ca    8.1<L>      23 Jan 2018 05:26                          ASSESSMENT &  PLAN:  POD # S/P LEFT/ RIGHT TOTAL HIP REPLACEMENT    WEIGHT  BEARING AS TOLERATED, OOB AND AMBULATE, PHYSICAL THERAPY   DVT PROPHYLAXIS  MG PO BID  INCENTIVE SPIROMETRY   DISCHARGE PLANNING TO  REHAB
Ortho Post Op Check    Pt S/E at bedside, tolerated procedure well, pain controlled. No complaints at this time.    Vital Signs Last 24 Hrs  T(C): 36.6 (22 Jan 2018 12:17), Max: 36.6 (22 Jan 2018 12:17)  T(F): 97.9 (22 Jan 2018 12:17), Max: 97.9 (22 Jan 2018 12:17)  HR: 65 (22 Jan 2018 13:01) (62 - 75)  BP: 127/65 (22 Jan 2018 13:01) (117/62 - 143/87)  BP(mean): --  RR: 14 (22 Jan 2018 13:01) (14 - 18)  SpO2: 96% (22 Jan 2018 13:01) (95% - 98%)    Gen: NAD, AAOx3    Right Lower Extremity:  Dressing clean dry intact, +abd pillow	  +EHL/FHL/TA/GS  SILT L3-S1  +DP/PT Pulses  Compartments soft  No calf TTP B/L
Patient is a 77y old  Male who presents with a chief complaint of RIGHT HIP PAIN  RIGHT TOTAL HIP REPLACEMENT (23 Jan 2018 05:38)      INTERVAL HPI/OVERNIGHT EVENTS: pt feels well, going home with home pt now, plan dc for tomorrow    MEDICATIONS  (STANDING):  acetaminophen   Tablet 650 milliGRAM(s) Oral every 8 hours  amLODIPine   Tablet 10 milliGRAM(s) Oral daily  ascorbic acid 500 milliGRAM(s) Oral two times a day  aspirin enteric coated 325 milliGRAM(s) Oral two times a day  buPROPion . 100 milliGRAM(s) Oral two times a day  celecoxib 200 milliGRAM(s) Oral two times a day after meals  docusate sodium 100 milliGRAM(s) Oral three times a day  fenofibrate Tablet 145 milliGRAM(s) Oral daily  ferrous    sulfate 325 milliGRAM(s) Oral three times a day with meals  folic acid 1 milliGRAM(s) Oral daily  levothyroxine 25 MICROGram(s) Oral daily  levothyroxine 200 MICROGram(s) Oral daily  losartan 100 milliGRAM(s) Oral daily  mercaptopurine 100 milliGRAM(s) Oral daily  multivitamin 1 Tablet(s) Oral daily  pantoprazole    Tablet 40 milliGRAM(s) Oral daily    MEDICATIONS  (PRN):  HYDROmorphone   Tablet 2 milliGRAM(s) Oral every 4 hours PRN Moderate Pain (4 - 6)  HYDROmorphone   Tablet 4 milliGRAM(s) Oral every 4 hours PRN Severe Pain (7 - 10)  morphine  - Injectable 2 milliGRAM(s) IV Push every 4 hours PRN Severe Pain (7 - 10)  ondansetron Injectable 4 milliGRAM(s) IV Push every 6 hours PRN Nausea and/or Vomiting      Allergies    penicillin (Hives; Urticaria)  shellfish (Rash; Anaphylaxis; Hives; Short breath)    Intolerances        REVIEW OF SYSTEMS:  CONSTITUTIONAL: No fever, weight loss, or fatigue  EYES: No eye pain, visual disturbances  ENMT:  No difficulty hearing, tinnitus, vertigo; No sinus or throat pain  NECK: No pain or stiffness  RESPIRATORY: No cough, wheezing, chills or hemoptysis; No shortness of breath  CARDIOVASCULAR: No chest pain, palpitations, dizziness  GASTROINTESTINAL: No abdominal or epigastric pain. No nausea, vomiting, or hematemesis; No diarrhea or constipation. No melena or hematochezia.  GENITOURINARY: No dysuria, frequency, hematuria, or incontinence  NEUROLOGICAL: No headaches, memory loss, loss of strength, numbness, or tremors  SKIN: No itching, burning  LYMPH NODES: No enlarged glands  MUSCULOSKELETAL: hip pain less  PSYCHIATRIC: No depression, mood swings  HEME/LYMPH: No easy bruising, or bleeding gums  ALLERGY AND IMMUNOLOGIC: No hives    Vital Signs Last 24 Hrs  T(C): 36.4 (24 Jan 2018 07:36), Max: 37.2 (24 Jan 2018 00:02)  T(F): 97.6 (24 Jan 2018 07:36), Max: 99 (24 Jan 2018 00:02)  HR: 64 (24 Jan 2018 07:36) (63 - 74)  BP: 113/65 (24 Jan 2018 07:36) (105/64 - 135/75)  BP(mean): --  RR: 18 (24 Jan 2018 07:36) (16 - 18)  SpO2: 98% (24 Jan 2018 07:36) (96% - 98%)    PHYSICAL EXAM:  GENERAL: NAD, well-groomed, well-developed  HEAD:  Atraumatic, Normocephalic  EYES: EOMI, PERRLA, conjunctiva and sclera clear  ENMT: No tonsillar erythema, exudates, or enlargement   NECK: Supple, No JVD  NERVOUS SYSTEM:  Alert & Oriented X3, Good concentration  CHEST/LUNG: Clear to auscultation bilaterally; No rales, rhonchi, wheezing  HEART: Regular rate and rhythm  ABDOMEN: Soft, Nontender, Nondistended; Bowel sounds present  EXTREMITIES:  2+ Peripheral Pulses   LYMPH: No lymphadenopathy noted  SKIN: No rashes     LABS:                        9.1    x     )-----------( x        ( 24 Jan 2018 05:48 )             27.7       Ca    8.1        23 Jan 2018 05:26          CAPILLARY BLOOD GLUCOSE                RADIOLOGY & ADDITIONAL TESTS:  no new      Consultant(s) Notes Reviewed:  [ x] YES  [ ] NO    Care Discussed with Consultants/Other Providers [x ] YES  [ ] NO    Advanced care planning discussed with patient and family, advanced care planning forms reviewed, discussed, and completed.  20 minutes spent.
Patient is a 77y old  Male who presents with a chief complaint of RIGHT HIP PAIN  RIGHT TOTAL HIP REPLACEMENT (23 Jan 2018 05:38)      INTERVAL HPI/OVERNIGHT EVENTS:pod 1, doing well, complains of significant right hip pain, pain meds adjusted, pt request larry on dc. discussed with sw    MEDICATIONS  (STANDING):  acetaminophen   Tablet 650 milliGRAM(s) Oral every 8 hours  amLODIPine   Tablet 10 milliGRAM(s) Oral daily  ascorbic acid 500 milliGRAM(s) Oral two times a day  aspirin enteric coated 325 milliGRAM(s) Oral two times a day  buPROPion . 100 milliGRAM(s) Oral two times a day  celecoxib 200 milliGRAM(s) Oral two times a day after meals  docusate sodium 100 milliGRAM(s) Oral three times a day  fenofibrate Tablet 145 milliGRAM(s) Oral daily  ferrous    sulfate 325 milliGRAM(s) Oral three times a day with meals  folic acid 1 milliGRAM(s) Oral daily  levothyroxine 25 MICROGram(s) Oral daily  losartan 100 milliGRAM(s) Oral daily  multivitamin 1 Tablet(s) Oral daily  pantoprazole    Tablet 40 milliGRAM(s) Oral daily    MEDICATIONS  (PRN):  HYDROmorphone   Tablet 2 milliGRAM(s) Oral every 4 hours PRN Moderate Pain (4 - 6)  HYDROmorphone   Tablet 4 milliGRAM(s) Oral every 4 hours PRN Severe Pain (7 - 10)  morphine  - Injectable 2 milliGRAM(s) IV Push every 4 hours PRN Severe Pain (7 - 10)  ondansetron Injectable 4 milliGRAM(s) IV Push every 6 hours PRN Nausea and/or Vomiting      Allergies    penicillin (Hives; Urticaria)  shellfish (Rash; Anaphylaxis; Hives; Short breath)    Intolerances        REVIEW OF SYSTEMS:  CONSTITUTIONAL: pain  EYES: No eye pain, visual disturbances  ENMT:  No difficulty hearing, tinnitus, vertigo; No sinus or throat pain  NECK: No pain or stiffness  RESPIRATORY: No cough, wheezing, chills or hemoptysis; No shortness of breath  CARDIOVASCULAR: No chest pain, palpitations, dizziness  GASTROINTESTINAL: No abdominal or epigastric pain. No nausea, vomiting, or hematemesis; No diarrhea or constipation. No melena or hematochezia.  GENITOURINARY: No dysuria, frequency, hematuria, or incontinence  NEUROLOGICAL: No headaches, memory loss, loss of strength, numbness, or tremors  SKIN: No itching, burning  LYMPH NODES: No enlarged glands  MUSCULOSKELETAL:: right hip pain  PSYCHIATRIC: No depression, mood swings  HEME/LYMPH: No easy bruising, or bleeding gums  ALLERGY AND IMMUNOLOGIC: No hives    Vital Signs Last 24 Hrs  T(C): 37.2 (23 Jan 2018 07:41), Max: 37.4 (23 Jan 2018 04:10)  T(F): 98.9 (23 Jan 2018 07:41), Max: 99.3 (23 Jan 2018 04:10)  HR: 67 (23 Jan 2018 07:41) (62 - 76)  BP: 123/74 (23 Jan 2018 07:41) (103/52 - 146/69)  BP(mean): --  RR: 17 (23 Jan 2018 07:41) (13 - 18)  SpO2: 96% (23 Jan 2018 07:41) (94% - 98%)    PHYSICAL EXAM:  GENERAL: NAD, well-groomed, well-developed  HEAD:  Atraumatic, Normocephalic  EYES: EOMI, PERRLA, conjunctiva and sclera clear  ENMT: No tonsillar erythema, exudates, or enlargement   NECK: Supple, No JVD  NERVOUS SYSTEM:  Alert & Oriented X3, Good concentration  CHEST/LUNG: Clear to auscultation bilaterally; No rales, rhonchi, wheezing  HEART: Regular rate and rhythm  ABDOMEN: Soft, Nontender, Nondistended; Bowel sounds present  EXTREMITIES:  2+ Peripheral Pulses   LYMPH: No lymphadenopathy noted  SKIN: No rashes     LABS:                        9.6    x     )-----------( x        ( 23 Jan 2018 05:26 )             29.2     23 Jan 2018 05:26    143    |  107    |  9      ----------------------------<  113    3.7     |  27     |  0.84     Ca    8.1        23 Jan 2018 05:26          CAPILLARY BLOOD GLUCOSE                RADIOLOGY & ADDITIONAL TESTS:  no new      Consultant(s) Notes Reviewed:  [ x] YES  [ ] NO    Care Discussed with Consultants/Other Providers [x ] YES  [ ] NO    Advanced care planning discussed with patient and family, advanced care planning forms reviewed, discussed, and completed.  20 minutes spent.
SATNAMGRETA CANDELARIO      77y   male  [id number]    ORTHOPEDIC SURGERY / DR. WALL    POD # 2    FOLLOW UP     Vital Signs Last 24 Hrs  T(C): 36.3 (24 Jan 2018 16:19), Max: 37.2 (24 Jan 2018 00:02)  T(F): 97.4 (24 Jan 2018 16:19), Max: 99 (24 Jan 2018 00:02)  HR: 64 (24 Jan 2018 07:36) (63 - 73)  BP: 113/65 (24 Jan 2018 07:36) (105/64 - 135/75)  BP(mean): --  RR: 18 (24 Jan 2018 07:36) (16 - 18)  SpO2: 98% (24 Jan 2018 07:36) (96% - 98%)    RIGHT HIP :    WOUND DRY AND INTACT  SOME EDEMA  GOOD MOTOR TO RIGHT LOWER EXTREMITY  NEURO-VASCULAR STATUS INTACT  NO CALF TENDERNESS                         ASSESSMENT &  PLAN:  POD # 2 S/P  RIGHT TOTAL HIP REPLACEMENT    WEIGHT  BEARING AS TOLERATED, OOB AND AMBULATE, PHYSICAL THERAPY   DOING  WELL WITH PT   DVT PROPHYLAXIS  MG PO BID  DISCHARGE  HOME WITH HOME CARE NOW  NEW AQUACEL DRESSING APPLIED
SATNAMGRETA CANDELARIO      77y   male  MRN-[id number]    ORTHOPEDIC SURGERY / DR. WALL    POD # 2    Vital Signs Last 24 Hrs  T(C): 36.7 (24 Jan 2018 04:39), Max: 37.2 (23 Jan 2018 07:41)  T(F): 98.1 (24 Jan 2018 04:39), Max: 99 (24 Jan 2018 00:02)  HR: 72 (24 Jan 2018 04:39) (65 - 74)  BP: 105/64 (24 Jan 2018 04:39) (105/64 - 135/75)  BP(mean): --  RR: 16 (24 Jan 2018 04:39) (16 - 17)  SpO2: 96% (24 Jan 2018 04:39) (96% - 97%)    RIGHT HIP :    DRESSING DRY AND INTACT  SOME EDEMA  GOOD MOTOR TO RIGHT LOWER EXTREMITY  NEURO-VASCULAR STATUS INTACT  NO CALF TENDERNESS    Hemoglobin: 9.1 (01-24 @ 05:48)  Hemoglobin: 9.6 (01-23 @ 05:26)  Hemoglobin: 10.0 (01-22 @ 13:29)    Hematocrit: 27.7 (01-24 @ 05:48)  Hematocrit: 29.2 (01-23 @ 05:26)  Hematocrit: 30.4 (01-22 @ 13:29)    01-23    143  |  107  |  9   ----------------------------<  113<H>  3.7   |  27  |  0.84    Ca    8.1<L>      23 Jan 2018 05:26                           ASSESSMENT &  PLAN:  POD # 2 S/P  RIGHT TOTAL HIP REPLACEMENT    WEIGHT  BEARING AS TOLERATED, OOB AND AMBULATE, PHYSICAL THERAPY   DVT PROPHYLAXIS  MG PO BID  INCENTIVE SPIROMETRY   DISCHARGE PLANNING TO HOME WITH HOME CARE IN AM
s/p general anesthesia  no complications noted at this time

## 2018-02-28 ENCOUNTER — TRANSCRIPTION ENCOUNTER (OUTPATIENT)
Age: 78
End: 2018-02-28

## 2018-06-24 ENCOUNTER — TRANSCRIPTION ENCOUNTER (OUTPATIENT)
Age: 78
End: 2018-06-24

## 2019-01-25 ENCOUNTER — TRANSCRIPTION ENCOUNTER (OUTPATIENT)
Age: 79
End: 2019-01-25

## 2019-03-06 PROBLEM — E03.9 HYPOTHYROIDISM, UNSPECIFIED: Chronic | Status: ACTIVE | Noted: 2018-01-16

## 2019-03-06 PROBLEM — K21.0 GASTRO-ESOPHAGEAL REFLUX DISEASE WITH ESOPHAGITIS: Chronic | Status: ACTIVE | Noted: 2018-01-16

## 2019-03-06 PROBLEM — M54.9 DORSALGIA, UNSPECIFIED: Chronic | Status: ACTIVE | Noted: 2018-01-16

## 2019-03-06 PROBLEM — E78.00 PURE HYPERCHOLESTEROLEMIA, UNSPECIFIED: Chronic | Status: ACTIVE | Noted: 2018-01-16

## 2019-03-06 PROBLEM — I10 ESSENTIAL (PRIMARY) HYPERTENSION: Chronic | Status: ACTIVE | Noted: 2018-01-16

## 2019-03-07 ENCOUNTER — OUTPATIENT (OUTPATIENT)
Dept: OUTPATIENT SERVICES | Facility: HOSPITAL | Age: 79
LOS: 1 days | End: 2019-03-07
Payer: MEDICARE

## 2019-03-07 VITALS
OXYGEN SATURATION: 98 % | RESPIRATION RATE: 16 BRPM | HEART RATE: 65 BPM | SYSTOLIC BLOOD PRESSURE: 125 MMHG | TEMPERATURE: 98 F | WEIGHT: 184.97 LBS | HEIGHT: 68 IN | DIASTOLIC BLOOD PRESSURE: 61 MMHG

## 2019-03-07 DIAGNOSIS — Z41.9 ENCOUNTER FOR PROCEDURE FOR PURPOSES OTHER THAN REMEDYING HEALTH STATE, UNSPECIFIED: Chronic | ICD-10-CM

## 2019-03-07 DIAGNOSIS — Z01.818 ENCOUNTER FOR OTHER PREPROCEDURAL EXAMINATION: ICD-10-CM

## 2019-03-07 DIAGNOSIS — Z98.890 OTHER SPECIFIED POSTPROCEDURAL STATES: Chronic | ICD-10-CM

## 2019-03-07 DIAGNOSIS — M16.12 UNILATERAL PRIMARY OSTEOARTHRITIS, LEFT HIP: ICD-10-CM

## 2019-03-07 DIAGNOSIS — Z96.641 PRESENCE OF RIGHT ARTIFICIAL HIP JOINT: Chronic | ICD-10-CM

## 2019-03-07 LAB
ALBUMIN SERPL ELPH-MCNC: 3.7 G/DL — SIGNIFICANT CHANGE UP (ref 3.3–5)
ALP SERPL-CCNC: 62 U/L — SIGNIFICANT CHANGE UP (ref 40–120)
ALT FLD-CCNC: 24 U/L — SIGNIFICANT CHANGE UP (ref 12–78)
ANION GAP SERPL CALC-SCNC: 6 MMOL/L — SIGNIFICANT CHANGE UP (ref 5–17)
APTT BLD: 33.5 SEC — SIGNIFICANT CHANGE UP (ref 28.5–37)
AST SERPL-CCNC: 15 U/L — SIGNIFICANT CHANGE UP (ref 15–37)
BILIRUB SERPL-MCNC: 0.4 MG/DL — SIGNIFICANT CHANGE UP (ref 0.2–1.2)
BUN SERPL-MCNC: 14 MG/DL — SIGNIFICANT CHANGE UP (ref 7–23)
CALCIUM SERPL-MCNC: 8.6 MG/DL — SIGNIFICANT CHANGE UP (ref 8.5–10.1)
CHLORIDE SERPL-SCNC: 109 MMOL/L — HIGH (ref 96–108)
CO2 SERPL-SCNC: 29 MMOL/L — SIGNIFICANT CHANGE UP (ref 22–31)
CREAT SERPL-MCNC: 0.84 MG/DL — SIGNIFICANT CHANGE UP (ref 0.5–1.3)
GLUCOSE SERPL-MCNC: 89 MG/DL — SIGNIFICANT CHANGE UP (ref 70–99)
HCT VFR BLD CALC: 35.6 % — LOW (ref 39–50)
HGB BLD-MCNC: 12.1 G/DL — LOW (ref 13–17)
INR BLD: 0.92 RATIO — SIGNIFICANT CHANGE UP (ref 0.88–1.16)
MCHC RBC-ENTMCNC: 33.4 PG — SIGNIFICANT CHANGE UP (ref 27–34)
MCHC RBC-ENTMCNC: 34 GM/DL — SIGNIFICANT CHANGE UP (ref 32–36)
MCV RBC AUTO: 98.3 FL — SIGNIFICANT CHANGE UP (ref 80–100)
NRBC # BLD: 0 /100 WBCS — SIGNIFICANT CHANGE UP (ref 0–0)
PLATELET # BLD AUTO: 321 K/UL — SIGNIFICANT CHANGE UP (ref 150–400)
POTASSIUM SERPL-MCNC: 4.3 MMOL/L — SIGNIFICANT CHANGE UP (ref 3.5–5.3)
POTASSIUM SERPL-SCNC: 4.3 MMOL/L — SIGNIFICANT CHANGE UP (ref 3.5–5.3)
PROT SERPL-MCNC: 6.8 G/DL — SIGNIFICANT CHANGE UP (ref 6–8.3)
PROTHROM AB SERPL-ACNC: 10.5 SEC — SIGNIFICANT CHANGE UP (ref 10–12.9)
RBC # BLD: 3.62 M/UL — LOW (ref 4.2–5.8)
RBC # FLD: 13.6 % — SIGNIFICANT CHANGE UP (ref 10.3–14.5)
SODIUM SERPL-SCNC: 144 MMOL/L — SIGNIFICANT CHANGE UP (ref 135–145)
WBC # BLD: 4.37 K/UL — SIGNIFICANT CHANGE UP (ref 3.8–10.5)
WBC # FLD AUTO: 4.37 K/UL — SIGNIFICANT CHANGE UP (ref 3.8–10.5)

## 2019-03-07 PROCEDURE — 87640 STAPH A DNA AMP PROBE: CPT

## 2019-03-07 PROCEDURE — 86900 BLOOD TYPING SEROLOGIC ABO: CPT

## 2019-03-07 PROCEDURE — 73502 X-RAY EXAM HIP UNI 2-3 VIEWS: CPT

## 2019-03-07 PROCEDURE — 86850 RBC ANTIBODY SCREEN: CPT

## 2019-03-07 PROCEDURE — 93005 ELECTROCARDIOGRAM TRACING: CPT

## 2019-03-07 PROCEDURE — 36415 COLL VENOUS BLD VENIPUNCTURE: CPT

## 2019-03-07 PROCEDURE — 86901 BLOOD TYPING SEROLOGIC RH(D): CPT

## 2019-03-07 PROCEDURE — 80053 COMPREHEN METABOLIC PANEL: CPT

## 2019-03-07 PROCEDURE — 83036 HEMOGLOBIN GLYCOSYLATED A1C: CPT

## 2019-03-07 PROCEDURE — G0463: CPT

## 2019-03-07 PROCEDURE — 93010 ELECTROCARDIOGRAM REPORT: CPT | Mod: NC

## 2019-03-07 PROCEDURE — 73502 X-RAY EXAM HIP UNI 2-3 VIEWS: CPT | Mod: 26,LT

## 2019-03-07 PROCEDURE — 85027 COMPLETE CBC AUTOMATED: CPT

## 2019-03-07 PROCEDURE — 85730 THROMBOPLASTIN TIME PARTIAL: CPT

## 2019-03-07 PROCEDURE — 85610 PROTHROMBIN TIME: CPT

## 2019-03-07 RX ORDER — MERCAPTOPURINE 50 MG/1
2 TABLET ORAL
Qty: 0 | Refills: 0 | COMMUNITY

## 2019-03-07 RX ORDER — BUPROPION HYDROCHLORIDE 150 MG/1
1 TABLET, EXTENDED RELEASE ORAL
Qty: 0 | Refills: 0 | COMMUNITY

## 2019-03-07 NOTE — H&P PST ADULT - HISTORY OF PRESENT ILLNESS
78 y.o. male with h/o HTN, GERD, generalized arthritis s/p Right hip replacement (01/2018) c/o worsening Left hip pain. An x-ray of Left hip revealed severe osteoarthritis. He is scheduled for Left Total Hip Replacement.

## 2019-03-07 NOTE — H&P PST ADULT - PSH
Elective surgery  bilat cataract, 2016  Elective surgery  bilat knee arthroscopy, 1990's  Elective surgery  hernia repair  - umbilical,  1990  Elective surgery  left shoulder 2000  Elective surgery  laminectomy with  fusion L5-S1,  2017  H/O hernia repair  ventral, open, 2004  History of total hip replacement, 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

## 2019-03-07 NOTE — H&P PST ADULT - PROBLEM SELECTOR PLAN 1
Left Total Hip Replacement.    Labs, MC. Pre-op and Hibiclens instructions reviewed and given. Take routine am meds DOS with sip of water. Avoid NSAIDs and OTC supplements. Verbalized understanding.

## 2019-03-07 NOTE — H&P PST ADULT - PMH
Arthritis  generalized  Back pain    Colitis    Degenerative disc disease, lumbar  and cervical  Depression    Gout  - Right big toe, last attack in 1973  Hiatal hernia    HTN (hypertension)    Hypercholesteremia    Hypothyroid    Mild intermittent asthma without complication  last inhaler use in December, 2018  Reflux esophagitis

## 2019-03-08 PROBLEM — M19.90 UNSPECIFIED OSTEOARTHRITIS, UNSPECIFIED SITE: Chronic | Status: ACTIVE | Noted: 2018-01-16

## 2019-03-08 PROBLEM — K44.9 DIAPHRAGMATIC HERNIA WITHOUT OBSTRUCTION OR GANGRENE: Chronic | Status: ACTIVE | Noted: 2018-01-16

## 2019-03-08 PROBLEM — M10.9 GOUT, UNSPECIFIED: Chronic | Status: ACTIVE | Noted: 2019-03-07

## 2019-03-08 LAB
ESTIMATED AVERAGE GLUCOSE: 103 MG/DL — SIGNIFICANT CHANGE UP (ref 68–114)
HBA1C BLD-MCNC: 5.2 % — SIGNIFICANT CHANGE UP (ref 4–5.6)
MRSA PCR RESULT.: SIGNIFICANT CHANGE UP
S AUREUS DNA NOSE QL NAA+PROBE: SIGNIFICANT CHANGE UP

## 2019-03-17 ENCOUNTER — TRANSCRIPTION ENCOUNTER (OUTPATIENT)
Age: 79
End: 2019-03-17

## 2019-03-18 ENCOUNTER — RESULT REVIEW (OUTPATIENT)
Age: 79
End: 2019-03-18

## 2019-03-18 ENCOUNTER — INPATIENT (INPATIENT)
Facility: HOSPITAL | Age: 79
LOS: 1 days | Discharge: HOME | DRG: 470 | End: 2019-03-20
Attending: ORTHOPAEDIC SURGERY | Admitting: ORTHOPAEDIC SURGERY
Payer: MEDICARE

## 2019-03-18 VITALS
WEIGHT: 184.97 LBS | RESPIRATION RATE: 13 BRPM | SYSTOLIC BLOOD PRESSURE: 113 MMHG | HEIGHT: 68 IN | DIASTOLIC BLOOD PRESSURE: 74 MMHG | OXYGEN SATURATION: 96 % | TEMPERATURE: 98 F | HEART RATE: 63 BPM

## 2019-03-18 DIAGNOSIS — M16.12 UNILATERAL PRIMARY OSTEOARTHRITIS, LEFT HIP: ICD-10-CM

## 2019-03-18 DIAGNOSIS — Z98.890 OTHER SPECIFIED POSTPROCEDURAL STATES: Chronic | ICD-10-CM

## 2019-03-18 DIAGNOSIS — Z41.9 ENCOUNTER FOR PROCEDURE FOR PURPOSES OTHER THAN REMEDYING HEALTH STATE, UNSPECIFIED: Chronic | ICD-10-CM

## 2019-03-18 DIAGNOSIS — Z96.641 PRESENCE OF RIGHT ARTIFICIAL HIP JOINT: Chronic | ICD-10-CM

## 2019-03-18 LAB
ANION GAP SERPL CALC-SCNC: 5 MMOL/L — SIGNIFICANT CHANGE UP (ref 5–17)
BUN SERPL-MCNC: 14 MG/DL — SIGNIFICANT CHANGE UP (ref 7–23)
CALCIUM SERPL-MCNC: 8 MG/DL — LOW (ref 8.5–10.1)
CHLORIDE SERPL-SCNC: 108 MMOL/L — SIGNIFICANT CHANGE UP (ref 96–108)
CO2 SERPL-SCNC: 29 MMOL/L — SIGNIFICANT CHANGE UP (ref 22–31)
CREAT SERPL-MCNC: 0.95 MG/DL — SIGNIFICANT CHANGE UP (ref 0.5–1.3)
GLUCOSE SERPL-MCNC: 137 MG/DL — HIGH (ref 70–99)
HCT VFR BLD CALC: 29.4 % — LOW (ref 39–50)
HGB BLD-MCNC: 10.1 G/DL — LOW (ref 13–17)
MAGNESIUM SERPL-MCNC: 1.6 MG/DL — SIGNIFICANT CHANGE UP (ref 1.6–2.6)
MCHC RBC-ENTMCNC: 33.4 PG — SIGNIFICANT CHANGE UP (ref 27–34)
MCHC RBC-ENTMCNC: 34.4 GM/DL — SIGNIFICANT CHANGE UP (ref 32–36)
MCV RBC AUTO: 97.4 FL — SIGNIFICANT CHANGE UP (ref 80–100)
NRBC # BLD: 0 /100 WBCS — SIGNIFICANT CHANGE UP (ref 0–0)
PLATELET # BLD AUTO: 305 K/UL — SIGNIFICANT CHANGE UP (ref 150–400)
POTASSIUM SERPL-MCNC: 3.9 MMOL/L — SIGNIFICANT CHANGE UP (ref 3.5–5.3)
POTASSIUM SERPL-SCNC: 3.9 MMOL/L — SIGNIFICANT CHANGE UP (ref 3.5–5.3)
RBC # BLD: 3.02 M/UL — LOW (ref 4.2–5.8)
RBC # FLD: 13.3 % — SIGNIFICANT CHANGE UP (ref 10.3–14.5)
SODIUM SERPL-SCNC: 142 MMOL/L — SIGNIFICANT CHANGE UP (ref 135–145)
WBC # BLD: 7.75 K/UL — SIGNIFICANT CHANGE UP (ref 3.8–10.5)
WBC # FLD AUTO: 7.75 K/UL — SIGNIFICANT CHANGE UP (ref 3.8–10.5)

## 2019-03-18 PROCEDURE — 88311 DECALCIFY TISSUE: CPT | Mod: 26

## 2019-03-18 PROCEDURE — 73501 X-RAY EXAM HIP UNI 1 VIEW: CPT | Mod: 26,LT

## 2019-03-18 PROCEDURE — 88305 TISSUE EXAM BY PATHOLOGIST: CPT | Mod: 26

## 2019-03-18 PROCEDURE — 99222 1ST HOSP IP/OBS MODERATE 55: CPT

## 2019-03-18 PROCEDURE — 93010 ELECTROCARDIOGRAM REPORT: CPT

## 2019-03-18 RX ORDER — FOLIC ACID 0.8 MG
1 TABLET ORAL DAILY
Qty: 0 | Refills: 0 | Status: DISCONTINUED | OUTPATIENT
Start: 2019-03-18 | End: 2019-03-20

## 2019-03-18 RX ORDER — ACETAMINOPHEN 500 MG
1000 TABLET ORAL ONCE
Qty: 0 | Refills: 0 | Status: COMPLETED | OUTPATIENT
Start: 2019-03-18 | End: 2019-03-18

## 2019-03-18 RX ORDER — TRAMADOL HYDROCHLORIDE 50 MG/1
50 TABLET ORAL EVERY 6 HOURS
Qty: 0 | Refills: 0 | Status: DISCONTINUED | OUTPATIENT
Start: 2019-03-18 | End: 2019-03-20

## 2019-03-18 RX ORDER — LEVOTHYROXINE SODIUM 125 MCG
200 TABLET ORAL DAILY
Qty: 0 | Refills: 0 | Status: DISCONTINUED | OUTPATIENT
Start: 2019-03-18 | End: 2019-03-20

## 2019-03-18 RX ORDER — PANTOPRAZOLE SODIUM 20 MG/1
40 TABLET, DELAYED RELEASE ORAL
Qty: 0 | Refills: 0 | Status: DISCONTINUED | OUTPATIENT
Start: 2019-03-18 | End: 2019-03-20

## 2019-03-18 RX ORDER — SODIUM CHLORIDE 9 MG/ML
1000 INJECTION, SOLUTION INTRAVENOUS
Qty: 0 | Refills: 0 | Status: DISCONTINUED | OUTPATIENT
Start: 2019-03-18 | End: 2019-03-19

## 2019-03-18 RX ORDER — ALBUTEROL 90 UG/1
2 AEROSOL, METERED ORAL EVERY 6 HOURS
Qty: 0 | Refills: 0 | Status: DISCONTINUED | OUTPATIENT
Start: 2019-03-18 | End: 2019-03-20

## 2019-03-18 RX ORDER — ASCORBIC ACID 60 MG
500 TABLET,CHEWABLE ORAL
Qty: 0 | Refills: 0 | Status: DISCONTINUED | OUTPATIENT
Start: 2019-03-18 | End: 2019-03-20

## 2019-03-18 RX ORDER — MERCAPTOPURINE 50 MG/1
75 TABLET ORAL DAILY
Qty: 0 | Refills: 0 | Status: DISCONTINUED | OUTPATIENT
Start: 2019-03-18 | End: 2019-03-20

## 2019-03-18 RX ORDER — FAMOTIDINE 10 MG/ML
20 INJECTION INTRAVENOUS ONCE
Qty: 0 | Refills: 0 | Status: COMPLETED | OUTPATIENT
Start: 2019-03-18 | End: 2019-03-18

## 2019-03-18 RX ORDER — RIVAROXABAN 15 MG-20MG
10 KIT ORAL DAILY
Qty: 0 | Refills: 0 | Status: DISCONTINUED | OUTPATIENT
Start: 2019-03-19 | End: 2019-03-20

## 2019-03-18 RX ORDER — CEFAZOLIN SODIUM 1 G
2000 VIAL (EA) INJECTION ONCE
Qty: 0 | Refills: 0 | Status: COMPLETED | OUTPATIENT
Start: 2019-03-18 | End: 2019-03-18

## 2019-03-18 RX ORDER — ONDANSETRON 8 MG/1
4 TABLET, FILM COATED ORAL EVERY 6 HOURS
Qty: 0 | Refills: 0 | Status: DISCONTINUED | OUTPATIENT
Start: 2019-03-18 | End: 2019-03-20

## 2019-03-18 RX ORDER — SODIUM CHLORIDE 9 MG/ML
1000 INJECTION, SOLUTION INTRAVENOUS
Qty: 0 | Refills: 0 | Status: DISCONTINUED | OUTPATIENT
Start: 2019-03-18 | End: 2019-03-18

## 2019-03-18 RX ORDER — LEVOTHYROXINE SODIUM 125 MCG
25 TABLET ORAL DAILY
Qty: 0 | Refills: 0 | Status: DISCONTINUED | OUTPATIENT
Start: 2019-03-18 | End: 2019-03-20

## 2019-03-18 RX ORDER — ONDANSETRON 8 MG/1
4 TABLET, FILM COATED ORAL ONCE
Qty: 0 | Refills: 0 | Status: DISCONTINUED | OUTPATIENT
Start: 2019-03-18 | End: 2019-03-18

## 2019-03-18 RX ORDER — FENOFIBRATE,MICRONIZED 130 MG
145 CAPSULE ORAL DAILY
Qty: 0 | Refills: 0 | Status: DISCONTINUED | OUTPATIENT
Start: 2019-03-18 | End: 2019-03-20

## 2019-03-18 RX ORDER — DOCUSATE SODIUM 100 MG
100 CAPSULE ORAL THREE TIMES A DAY
Qty: 0 | Refills: 0 | Status: DISCONTINUED | OUTPATIENT
Start: 2019-03-18 | End: 2019-03-20

## 2019-03-18 RX ORDER — AMLODIPINE BESYLATE 2.5 MG/1
10 TABLET ORAL DAILY
Qty: 0 | Refills: 0 | Status: DISCONTINUED | OUTPATIENT
Start: 2019-03-18 | End: 2019-03-20

## 2019-03-18 RX ORDER — CEFAZOLIN SODIUM 1 G
2000 VIAL (EA) INJECTION EVERY 8 HOURS
Qty: 0 | Refills: 0 | Status: COMPLETED | OUTPATIENT
Start: 2019-03-18 | End: 2019-03-19

## 2019-03-18 RX ORDER — LOSARTAN POTASSIUM 100 MG/1
100 TABLET, FILM COATED ORAL DAILY
Qty: 0 | Refills: 0 | Status: DISCONTINUED | OUTPATIENT
Start: 2019-03-18 | End: 2019-03-20

## 2019-03-18 RX ORDER — HYDROMORPHONE HYDROCHLORIDE 2 MG/ML
0.5 INJECTION INTRAMUSCULAR; INTRAVENOUS; SUBCUTANEOUS
Qty: 0 | Refills: 0 | Status: DISCONTINUED | OUTPATIENT
Start: 2019-03-18 | End: 2019-03-18

## 2019-03-18 RX ORDER — HYDROMORPHONE HYDROCHLORIDE 2 MG/ML
0.5 INJECTION INTRAMUSCULAR; INTRAVENOUS; SUBCUTANEOUS EVERY 4 HOURS
Qty: 0 | Refills: 0 | Status: DISCONTINUED | OUTPATIENT
Start: 2019-03-18 | End: 2019-03-20

## 2019-03-18 RX ORDER — ACETAMINOPHEN 500 MG
650 TABLET ORAL EVERY 8 HOURS
Qty: 0 | Refills: 0 | Status: DISCONTINUED | OUTPATIENT
Start: 2019-03-19 | End: 2019-03-20

## 2019-03-18 RX ORDER — TRAMADOL HYDROCHLORIDE 50 MG/1
100 TABLET ORAL EVERY 6 HOURS
Qty: 0 | Refills: 0 | Status: DISCONTINUED | OUTPATIENT
Start: 2019-03-18 | End: 2019-03-20

## 2019-03-18 RX ORDER — FERROUS SULFATE 325(65) MG
325 TABLET ORAL
Qty: 0 | Refills: 0 | Status: DISCONTINUED | OUTPATIENT
Start: 2019-03-18 | End: 2019-03-20

## 2019-03-18 RX ORDER — CELECOXIB 200 MG/1
200 CAPSULE ORAL
Qty: 0 | Refills: 0 | Status: DISCONTINUED | OUTPATIENT
Start: 2019-03-18 | End: 2019-03-20

## 2019-03-18 RX ORDER — GABAPENTIN 400 MG/1
100 CAPSULE ORAL
Qty: 0 | Refills: 0 | Status: DISCONTINUED | OUTPATIENT
Start: 2019-03-18 | End: 2019-03-20

## 2019-03-18 RX ORDER — ACETAMINOPHEN 500 MG
1000 TABLET ORAL ONCE
Qty: 0 | Refills: 0 | Status: COMPLETED | OUTPATIENT
Start: 2019-03-19 | End: 2019-03-19

## 2019-03-18 RX ORDER — BUPIVACAINE 13.3 MG/ML
20 INJECTION, SUSPENSION, LIPOSOMAL INFILTRATION ONCE
Qty: 0 | Refills: 0 | Status: DISCONTINUED | OUTPATIENT
Start: 2019-03-18 | End: 2019-03-18

## 2019-03-18 RX ADMIN — Medication 100 MILLIGRAM(S): at 21:36

## 2019-03-18 RX ADMIN — SODIUM CHLORIDE 75 MILLILITER(S): 9 INJECTION, SOLUTION INTRAVENOUS at 15:21

## 2019-03-18 RX ADMIN — TRAMADOL HYDROCHLORIDE 100 MILLIGRAM(S): 50 TABLET ORAL at 15:31

## 2019-03-18 RX ADMIN — Medication 500 MILLIGRAM(S): at 19:09

## 2019-03-18 RX ADMIN — FAMOTIDINE 20 MILLIGRAM(S): 10 INJECTION INTRAVENOUS at 20:47

## 2019-03-18 RX ADMIN — CELECOXIB 200 MILLIGRAM(S): 200 CAPSULE ORAL at 19:39

## 2019-03-18 RX ADMIN — SODIUM CHLORIDE 75 MILLILITER(S): 9 INJECTION, SOLUTION INTRAVENOUS at 12:52

## 2019-03-18 RX ADMIN — Medication 325 MILLIGRAM(S): at 19:09

## 2019-03-18 RX ADMIN — Medication 100 MILLIGRAM(S): at 19:09

## 2019-03-18 RX ADMIN — SODIUM CHLORIDE 60 MILLILITER(S): 9 INJECTION, SOLUTION INTRAVENOUS at 07:30

## 2019-03-18 RX ADMIN — HYDROMORPHONE HYDROCHLORIDE 0.5 MILLIGRAM(S): 2 INJECTION INTRAMUSCULAR; INTRAVENOUS; SUBCUTANEOUS at 12:51

## 2019-03-18 RX ADMIN — TRAMADOL HYDROCHLORIDE 100 MILLIGRAM(S): 50 TABLET ORAL at 22:09

## 2019-03-18 RX ADMIN — CELECOXIB 200 MILLIGRAM(S): 200 CAPSULE ORAL at 19:09

## 2019-03-18 RX ADMIN — HYDROMORPHONE HYDROCHLORIDE 0.5 MILLIGRAM(S): 2 INJECTION INTRAMUSCULAR; INTRAVENOUS; SUBCUTANEOUS at 13:19

## 2019-03-18 RX ADMIN — TRAMADOL HYDROCHLORIDE 100 MILLIGRAM(S): 50 TABLET ORAL at 21:36

## 2019-03-18 RX ADMIN — Medication 400 MILLIGRAM(S): at 20:47

## 2019-03-18 RX ADMIN — Medication 1000 MILLIGRAM(S): at 21:17

## 2019-03-18 RX ADMIN — Medication 400 MILLIGRAM(S): at 13:37

## 2019-03-18 RX ADMIN — TRAMADOL HYDROCHLORIDE 100 MILLIGRAM(S): 50 TABLET ORAL at 16:30

## 2019-03-18 RX ADMIN — GABAPENTIN 100 MILLIGRAM(S): 400 CAPSULE ORAL at 19:09

## 2019-03-18 NOTE — CONSULT NOTE ADULT - ASSESSMENT
78 year old man with hypertension, hyperlipidemia, GERD, colitis, s/p L THR, now with PVCs in the PACU:    - No current cardiac symptoms  - He seems to have a history of this in the past  - Monitor on remote telemetry overnight  - Check echocardiogram in AM  - Monitor and replete lytes as needed  - PVCs likely catechol mediated from stress from surgery  - BP OK.  amlodipine and losartan on hold.  Resume when able  - Aspirin on hold.  To be resumed when ok from surgical standpoint. No rush from a cardiac perspective  - No evidence of active ischemia or volume overload  - Fenofibrate to be resumed when able  - Check TSH  - To follow closely with you while admitted.

## 2019-03-18 NOTE — CONSULT NOTE ADULT - SUBJECTIVE AND OBJECTIVE BOX
Patient is a 78y old  Male who presents with a chief complaint of Left hip pain. (07 Mar 2019 13:58)  seen in PACU, resting comfortably, without distress  denies chest pain, denies shortness of breath, denies palpitations    INTERVAL HPI/OVERNIGHT EVENTS:  T(C): 36.6 (03-18-19 @ 13:33), Max: 36.7 (03-18-19 @ 12:16)  HR: 64 (03-18-19 @ 13:49) (60 - 64)  BP: 113/57 (03-18-19 @ 13:49) (85/49 - 113/74)  RR: 14 (03-18-19 @ 13:49) (12 - 14)  SpO2: 97% (03-18-19 @ 13:49) (96% - 97%)  Wt(kg): --  I&O's Summary      PAST MEDICAL & SURGICAL HISTORY:  Depression  Mild intermittent asthma without complication: last inhaler use in December, 2018  Degenerative disc disease, lumbar: and cervical  Gout: - Right big toe, last attack in 1973  Hiatal hernia  Back pain  Arthritis: generalized  Hypothyroid  Reflux esophagitis  Hypercholesteremia  HTN (hypertension)  Colitis  History of total hip replacement, 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  H/O hernia repair: ventral, open, 2004  Elective surgery: bilat cataract, 2016  Elective surgery: bilat knee arthroscopy, 1990&#x27;s  Elective surgery: hernia repair  - umbilical,  1990  Elective surgery: left shoulder 2000  Elective surgery: laminectomy with  fusion L5-S1,  2017      SOCIAL HISTORY  Alcohol: neg  Tobacco: neg  Illicit substance use: neg      FAMILY HISTORY:    Home Medications:  amLODIPine 10 mg oral tablet: 1 tab(s) orally once a day (18 Mar 2019 07:23)  aspirin 81 mg oral delayed release tablet: 1 tab(s) orally once a day, last dose 03/11/19 (18 Mar 2019 07:23)  buPROPion 100 mg oral tablet: 1 tab(s) orally 2 times a day (18 Mar 2019 07:23)  fenofibrate 145 mg oral tablet: 1 tab(s) orally once a day (18 Mar 2019 07:23)  levothyroxine 200 mcg (0.2 mg) oral tablet: 1 tab(s) orally once a day takes total of 225 mcg daily (18 Mar 2019 07:23)  levothyroxine 25 mcg (0.025 mg) oral tablet: 1 tab(s) orally once a day (18 Mar 2019 07:23)  losartan 100 mg oral tablet: 1 tab(s) orally once a day (18 Mar 2019 07:23)  mercaptopurine: 75 milligram(s) orally once a day (18 Mar 2019 07:23)  omeprazole 40 mg oral delayed release capsule: 1 cap(s) orally once a day (18 Mar 2019 07:23)  Percocet 5/325 oral tablet: 1 tab(s) orally every 6 hours, As Needed (18 Mar 2019 07:23)  Proventil HFA 90 mcg/inh inhalation aerosol: PRN (18 Mar 2019 07:23)  temazepam: 100 milligram(s) orally , As Needed (18 Mar 2019 07:23)  traZODone 100 mg oral tablet: 1 tab(s) orally 3 times a day, As Needed (18 Mar 2019 07:23)        LABS:                        10.1   7.75  )-----------( 305      ( 18 Mar 2019 12:51 )             29.4               CAPILLARY BLOOD GLUCOSE      POCT Blood Glucose.: 130 mg/dL (18 Mar 2019 12:41)  POCT Blood Glucose.: 95 mg/dL (18 Mar 2019 07:12)            MEDICATIONS  (STANDING):  gabapentin 100 milliGRAM(s) Oral two times a day  lactated ringers. 1000 milliLiter(s) (75 mL/Hr) IV Continuous <Continuous>    MEDICATIONS  (PRN):  HYDROmorphone  Injectable 0.5 milliGRAM(s) IV Push every 10 minutes PRN Severe Pain (7 - 10)  ondansetron Injectable 4 milliGRAM(s) IV Push once PRN Nausea and/or Vomiting      REVIEW OF SYSTEMS:  CONSTITUTIONAL: No fever, weight loss, or fatigue  EYES: No eye pain, visual disturbances, or discharge  ENMT:  No difficulty hearing, tinnitus, vertigo; No sinus or throat pain  NECK: No pain or stiffness  RESPIRATORY: No cough, wheezing, chills or hemoptysis; No shortness of breath  CARDIOVASCULAR: No chest pain, palpitations, dizziness, or leg swelling  GASTROINTESTINAL: No abdominal or epigastric pain. No nausea, vomiting, or hematemesis; No diarrhea or constipation. No melena or hematochezia.  GENITOURINARY: No dysuria, frequency, hematuria, or incontinence  NEUROLOGICAL: No headaches, memory loss, loss of strength, numbness, or tremors  SKIN: No itching, burning, rashes, or lesions   LYMPH NODES: No enlarged glands  ENDOCRINE: No heat or cold intolerance; No hair loss  MUSCULOSKELETAL:chronic left hip pain  PSYCHIATRIC: No depression, anxiety, mood swings, or difficulty sleeping  HEME/LYMPH: No easy bruising, or bleeding gums  ALLERY AND IMMUNOLOGIC: No hives or eczema    RADIOLOGY & ADDITIONAL TESTS:    Imaging Personally Reviewed:  [ ] YES  [ ] NO    Consultant(s) Notes Reviewed:  [ ] YES  [ ] NO        PHYSICAL EXAM:  GENERAL: NAD, well-groomed, well-developed  HEAD:  Atraumatic, Normocephalic  EYES: EOMI, PERRLA, conjunctiva and sclera clear  ENMT: No tonsillar erythema, exudates, or enlargement; Moist mucous membranes, Good dentition, No lesions  NECK: Supple, No JVD, Normal thyroid  NERVOUS SYSTEM:  Alert & Oriented X3, Good concentration; Motor Strength 5/5 B/L upper and lower extremities; DTRs 2+ intact and symmetric  CHEST/LUNG: Clear to percussion bilaterally; No rales, rhonchi, wheezing, or rubs  HEART: Regular rate and rhythm; No murmurs, rubs, or gallops  ABDOMEN: Soft, Nontender, Nondistended; Bowel sounds present  EXTREMITIES:  2+ Peripheral Pulses, No clubbing, cyanosis, or edema  LYMPH: No lymphadenopathy noted  SKIN: No rashes or lesions    Care Discussed with Consultants/Other Providers [ ] YES  [ ] NO

## 2019-03-18 NOTE — CONSULT NOTE ADULT - SUBJECTIVE AND OBJECTIVE BOX
[facility] - Layton Mijares, Tommy Miller Pannella, Patel, Savella  Office Number: [phone]    Initial Consult Note    CHIEF COMPLAINT: Patient is a 78y old  Male who presents with a chief complaint of Left hip pain, and is s/p L THR.  In the PACU he was noted to be in ventricular bigeminy, and a cardiac consultation was obtained.      HPI:  This is a 78 y.o. male with h/o HTN, GERD, generalized arthritis s/p Right hip replacement (01/2018) c/o worsening Left hip pain. An x-ray of Left hip revealed severe osteoarthritis.  He is now s/p left THR.  In the PACU he was noted to be in ventricular bigeminy.  He reports that he was evaluated by a cardiologist about 1 1/2 years ago prior to a colonoscopy.  He describes what sound to be PVCs.  He had a Holter, and reports he was told that everything was ok . He denies any current chest pain, dyspnea, PND, orthopnea, LE swelling, dizziness, or syncope.      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    FAMILY HISTORY:  Mom diet of MI    MEDICATIONS  (STANDING):  gabapentin 100 milliGRAM(s) Oral two times a day   Home medications reviewed in detail.      Allergies    penicillin (Hives; Urticaria)  shellfish (Rash; Anaphylaxis; Hives; Short breath)             REVIEW OF SYSTEMS:     All other review of systems is negative unless indicated above    VITAL SIGNS:   Vital Signs Last 24 Hrs  T(C): 36.7 (18 Mar 2019 14:35), Max: 36.7 (18 Mar 2019 12:16)  T(F): 98 (18 Mar 2019 14:35), Max: 98.1 (18 Mar 2019 12:16)  HR: 64 (18 Mar 2019 14:35) (60 - 68)  BP: 113/67 (18 Mar 2019 14:35) (85/49 - 118/60)  BP(mean): --  RR: 16 (18 Mar 2019 14:35) (12 - 16)  SpO2: 95% (18 Mar 2019 14:35) (95% - 97%)    I&O's Summary    18 Mar 2019 07:01  -  18 Mar 2019 14:57  --------------------------------------------------------  IN: 638 mL / OUT: 0 mL / NET: 638 mL        On Exam:    Constitutional: NAD, alert and oriented x 3  Lungs:  Non-labored, breath sounds are clear bilaterally, No wheezing, rales or rhonchi  Cardiovascular: RRR.  S1 and S2 positive.  1/6 systolic murmur  Gastrointestinal: Bowel Sounds present, soft, nontender.   Lymph: No peripheral edema. No cervical lymphadenopathy.  Neurological: Alert, no focal deficits  Skin: No rashes or ulcers   Psych:  Mood & affect appropriate.    LABS: All Labs Reviewed:                        10.1   7.75  )-----------( 305      ( 18 Mar 2019 12:51 )             29.4                 RADIOLOGY:    EKG:  NSR.  No acute ST or T wave changes

## 2019-03-18 NOTE — PROGRESS NOTE ADULT - SUBJECTIVE AND OBJECTIVE BOX
Patient is seen and examined at bedside. Denies CP/SOB/Dizziness/N/V/D/HA. Pain is controlled.     Vital Signs Last 24 Hrs  T(C): 36.6 (18 Mar 2019 09:15), Max: 36.6 (18 Mar 2019 08:45)  T(F): 97.9 (18 Mar 2019 09:15), Max: 97.9 (18 Mar 2019 08:45)  HR: 64 (18 Mar 2019 07:26) (63 - 64)  BP: 113/74 (18 Mar 2019 07:26) (113/74 - 113/74)  BP(mean): --  RR: 13 (18 Mar 2019 07:26) (13 - 13)  SpO2: 96% (18 Mar 2019 07:26) (96% - 96%)    GEN: NAD  LLE: Dressing C/D/I.   Motor intact + EHL/FHL/TA/GS in the BL LE. Sensation is grossly intact distal . Extremity warm. Compartments are soft. DP 1+    Labs: pending.     A/P: Patient is a 78y y/o Male s/p L RAMAKRISHNA, POD # 0    -Pain control/analgesia  -Inc spirometry  -Venodynes/foot pumps  -F/U AM Labs  -PT/OT/WBAT  -Antibiotic  -Anticoagulation  -Hip precautions

## 2019-03-19 ENCOUNTER — TRANSCRIPTION ENCOUNTER (OUTPATIENT)
Age: 79
End: 2019-03-19

## 2019-03-19 LAB
ANION GAP SERPL CALC-SCNC: 7 MMOL/L — SIGNIFICANT CHANGE UP (ref 5–17)
BUN SERPL-MCNC: 12 MG/DL — SIGNIFICANT CHANGE UP (ref 7–23)
CALCIUM SERPL-MCNC: 8.2 MG/DL — LOW (ref 8.5–10.1)
CHLORIDE SERPL-SCNC: 104 MMOL/L — SIGNIFICANT CHANGE UP (ref 96–108)
CO2 SERPL-SCNC: 30 MMOL/L — SIGNIFICANT CHANGE UP (ref 22–31)
CREAT SERPL-MCNC: 0.95 MG/DL — SIGNIFICANT CHANGE UP (ref 0.5–1.3)
GLUCOSE SERPL-MCNC: 107 MG/DL — HIGH (ref 70–99)
HCT VFR BLD CALC: 28.4 % — LOW (ref 39–50)
HGB BLD-MCNC: 9.5 G/DL — LOW (ref 13–17)
MCHC RBC-ENTMCNC: 32.9 PG — SIGNIFICANT CHANGE UP (ref 27–34)
MCHC RBC-ENTMCNC: 33.5 GM/DL — SIGNIFICANT CHANGE UP (ref 32–36)
MCV RBC AUTO: 98.3 FL — SIGNIFICANT CHANGE UP (ref 80–100)
NRBC # BLD: 0 /100 WBCS — SIGNIFICANT CHANGE UP (ref 0–0)
PLATELET # BLD AUTO: 301 K/UL — SIGNIFICANT CHANGE UP (ref 150–400)
POTASSIUM SERPL-MCNC: 3.6 MMOL/L — SIGNIFICANT CHANGE UP (ref 3.5–5.3)
POTASSIUM SERPL-SCNC: 3.6 MMOL/L — SIGNIFICANT CHANGE UP (ref 3.5–5.3)
RBC # BLD: 2.89 M/UL — LOW (ref 4.2–5.8)
RBC # FLD: 13.4 % — SIGNIFICANT CHANGE UP (ref 10.3–14.5)
SODIUM SERPL-SCNC: 141 MMOL/L — SIGNIFICANT CHANGE UP (ref 135–145)
WBC # BLD: 4.55 K/UL — SIGNIFICANT CHANGE UP (ref 3.8–10.5)
WBC # FLD AUTO: 4.55 K/UL — SIGNIFICANT CHANGE UP (ref 3.8–10.5)

## 2019-03-19 PROCEDURE — 99232 SBSQ HOSP IP/OBS MODERATE 35: CPT

## 2019-03-19 RX ORDER — RIVAROXABAN 15 MG-20MG
1 KIT ORAL
Qty: 34 | Refills: 0
Start: 2019-03-19 | End: 2019-04-21

## 2019-03-19 RX ORDER — CELECOXIB 200 MG/1
1 CAPSULE ORAL
Qty: 0 | Refills: 0 | DISCHARGE
Start: 2019-03-19

## 2019-03-19 RX ADMIN — HYDROMORPHONE HYDROCHLORIDE 0.5 MILLIGRAM(S): 2 INJECTION INTRAMUSCULAR; INTRAVENOUS; SUBCUTANEOUS at 10:22

## 2019-03-19 RX ADMIN — CELECOXIB 200 MILLIGRAM(S): 200 CAPSULE ORAL at 18:33

## 2019-03-19 RX ADMIN — TRAMADOL HYDROCHLORIDE 100 MILLIGRAM(S): 50 TABLET ORAL at 09:00

## 2019-03-19 RX ADMIN — Medication 1 MILLIGRAM(S): at 11:44

## 2019-03-19 RX ADMIN — Medication 650 MILLIGRAM(S): at 21:49

## 2019-03-19 RX ADMIN — Medication 100 MILLIGRAM(S): at 14:05

## 2019-03-19 RX ADMIN — Medication 25 MICROGRAM(S): at 05:13

## 2019-03-19 RX ADMIN — Medication 325 MILLIGRAM(S): at 08:09

## 2019-03-19 RX ADMIN — Medication 325 MILLIGRAM(S): at 17:52

## 2019-03-19 RX ADMIN — LOSARTAN POTASSIUM 100 MILLIGRAM(S): 100 TABLET, FILM COATED ORAL at 05:14

## 2019-03-19 RX ADMIN — TRAMADOL HYDROCHLORIDE 100 MILLIGRAM(S): 50 TABLET ORAL at 17:52

## 2019-03-19 RX ADMIN — Medication 500 MILLIGRAM(S): at 17:52

## 2019-03-19 RX ADMIN — Medication 325 MILLIGRAM(S): at 11:44

## 2019-03-19 RX ADMIN — HYDROMORPHONE HYDROCHLORIDE 0.5 MILLIGRAM(S): 2 INJECTION INTRAMUSCULAR; INTRAVENOUS; SUBCUTANEOUS at 10:07

## 2019-03-19 RX ADMIN — Medication 100 MILLIGRAM(S): at 01:55

## 2019-03-19 RX ADMIN — CELECOXIB 200 MILLIGRAM(S): 200 CAPSULE ORAL at 05:44

## 2019-03-19 RX ADMIN — HYDROMORPHONE HYDROCHLORIDE 0.5 MILLIGRAM(S): 2 INJECTION INTRAMUSCULAR; INTRAVENOUS; SUBCUTANEOUS at 00:01

## 2019-03-19 RX ADMIN — CELECOXIB 200 MILLIGRAM(S): 200 CAPSULE ORAL at 17:52

## 2019-03-19 RX ADMIN — TRAMADOL HYDROCHLORIDE 100 MILLIGRAM(S): 50 TABLET ORAL at 08:09

## 2019-03-19 RX ADMIN — Medication 500 MILLIGRAM(S): at 05:13

## 2019-03-19 RX ADMIN — Medication 100 MILLIGRAM(S): at 05:13

## 2019-03-19 RX ADMIN — Medication 650 MILLIGRAM(S): at 12:40

## 2019-03-19 RX ADMIN — TRAMADOL HYDROCHLORIDE 100 MILLIGRAM(S): 50 TABLET ORAL at 18:33

## 2019-03-19 RX ADMIN — Medication 100 MILLIGRAM(S): at 21:19

## 2019-03-19 RX ADMIN — GABAPENTIN 100 MILLIGRAM(S): 400 CAPSULE ORAL at 17:52

## 2019-03-19 RX ADMIN — Medication 400 MILLIGRAM(S): at 05:14

## 2019-03-19 RX ADMIN — Medication 1 TABLET(S): at 11:44

## 2019-03-19 RX ADMIN — Medication 650 MILLIGRAM(S): at 11:44

## 2019-03-19 RX ADMIN — GABAPENTIN 100 MILLIGRAM(S): 400 CAPSULE ORAL at 05:13

## 2019-03-19 RX ADMIN — Medication 1000 MILLIGRAM(S): at 05:44

## 2019-03-19 RX ADMIN — Medication 650 MILLIGRAM(S): at 21:19

## 2019-03-19 RX ADMIN — CELECOXIB 200 MILLIGRAM(S): 200 CAPSULE ORAL at 05:13

## 2019-03-19 RX ADMIN — Medication 200 MICROGRAM(S): at 05:13

## 2019-03-19 RX ADMIN — Medication 145 MILLIGRAM(S): at 11:44

## 2019-03-19 RX ADMIN — RIVAROXABAN 10 MILLIGRAM(S): KIT at 11:44

## 2019-03-19 RX ADMIN — HYDROMORPHONE HYDROCHLORIDE 0.5 MILLIGRAM(S): 2 INJECTION INTRAMUSCULAR; INTRAVENOUS; SUBCUTANEOUS at 00:31

## 2019-03-19 RX ADMIN — MERCAPTOPURINE 75 MILLIGRAM(S): 50 TABLET ORAL at 14:05

## 2019-03-19 RX ADMIN — PANTOPRAZOLE SODIUM 40 MILLIGRAM(S): 20 TABLET, DELAYED RELEASE ORAL at 05:14

## 2019-03-19 NOTE — PROGRESS NOTE ADULT - SUBJECTIVE AND OBJECTIVE BOX
Patient is a 78y old  Male who presents with a chief complaint of LEFT HIP ENDSTAGE OSTEOARTHRITIS   LEFT TOTAL HIP REPLACEMENT (19 Mar 2019 16:06)  feels well, without complaints  denies chest pain, denies shortness of breath, denies palpitations      INTERVAL HPI/OVERNIGHT EVENTS:  T(C): 37.2 (03-19-19 @ 20:16), Max: 37.2 (03-19-19 @ 20:16)  HR: 74 (03-19-19 @ 20:16) (59 - 74)  BP: 156/79 (03-19-19 @ 20:16) (101/62 - 156/79)  RR: 17 (03-19-19 @ 20:16) (16 - 18)  SpO2: 97% (03-19-19 @ 20:16) (96% - 100%)  Wt(kg): --  I&O's Summary    18 Mar 2019 07:01  -  19 Mar 2019 07:00  --------------------------------------------------------  IN: 2163 mL / OUT: 650 mL / NET: 1513 mL    19 Mar 2019 07:01  -  19 Mar 2019 20:21  --------------------------------------------------------  IN: 600 mL / OUT: 0 mL / NET: 600 mL        LABS:                        9.5    4.55  )-----------( 301      ( 19 Mar 2019 07:37 )             28.4     03-19    141  |  104  |  12  ----------------------------<  107<H>  3.6   |  30  |  0.95    Ca    8.2<L>      19 Mar 2019 07:37  Phos  3.1     03-19  Mg     1.7     03-19          CAPILLARY BLOOD GLUCOSE                MEDICATIONS  (STANDING):  acetaminophen   Tablet .. 650 milliGRAM(s) Oral every 8 hours  amLODIPine   Tablet 10 milliGRAM(s) Oral daily  ascorbic acid 500 milliGRAM(s) Oral two times a day  celecoxib 200 milliGRAM(s) Oral two times a day  docusate sodium 100 milliGRAM(s) Oral three times a day  fenofibrate Tablet 145 milliGRAM(s) Oral daily  ferrous    sulfate 325 milliGRAM(s) Oral three times a day with meals  folic acid 1 milliGRAM(s) Oral daily  gabapentin 100 milliGRAM(s) Oral two times a day  levothyroxine 25 MICROGram(s) Oral daily  levothyroxine 200 MICROGram(s) Oral daily  losartan 100 milliGRAM(s) Oral daily  mercaptopurine 75 milliGRAM(s) Oral daily  multivitamin 1 Tablet(s) Oral daily  pantoprazole    Tablet 40 milliGRAM(s) Oral before breakfast  rivaroxaban 10 milliGRAM(s) Oral daily    MEDICATIONS  (PRN):  ALBUTerol    90 MICROgram(s) HFA Inhaler 2 Puff(s) Inhalation every 6 hours PRN Shortness of Breath and/or Wheezing  HYDROmorphone  Injectable 0.5 milliGRAM(s) IV Push every 4 hours PRN Severe Pain (7 - 10)  ondansetron Injectable 4 milliGRAM(s) IV Push every 6 hours PRN Nausea and/or Vomiting  traMADol 50 milliGRAM(s) Oral every 6 hours PRN Mild Pain (1 - 3)  traMADol 100 milliGRAM(s) Oral every 6 hours PRN Moderate Pain (4 - 6)      REVIEW OF SYSTEMS:  CONSTITUTIONAL: No fever, weight loss, or fatigue  EYES: No eye pain, visual disturbances, or discharge  ENMT:  No difficulty hearing, tinnitus, vertigo; No sinus or throat pain  NECK: No pain or stiffness  RESPIRATORY: No cough, wheezing, chills or hemoptysis; No shortness of breath  CARDIOVASCULAR: No chest pain, palpitations, dizziness, or leg swelling  GASTROINTESTINAL: No abdominal or epigastric pain. No nausea, vomiting, or hematemesis; No diarrhea or constipation. No melena or hematochezia.  GENITOURINARY: No dysuria, frequency, hematuria, or incontinence  NEUROLOGICAL: No headaches, memory loss, loss of strength, numbness, or tremors  SKIN: No itching, burning, rashes, or lesions   LYMPH NODES: No enlarged glands  ENDOCRINE: No heat or cold intolerance; No hair loss  MUSCULOSKELETAL: chronic left hip discomfort  PSYCHIATRIC: No depression, anxiety, mood swings, or difficulty sleeping  HEME/LYMPH: No easy bruising, or bleeding gums  ALLERY AND IMMUNOLOGIC: No hives or eczema    RADIOLOGY & ADDITIONAL TESTS:    Imaging Personally Reviewed:  [ ] YES  [ ] NO    Consultant(s) Notes Reviewed:  [ ] YES  [ ] NO    PHYSICAL EXAM:  GENERAL: NAD, well-groomed, well-developed  HEAD:  Atraumatic, Normocephalic  EYES: EOMI, PERRLA, conjunctiva and sclera clear  ENMT: No tonsillar erythema, exudates, or enlargement; Moist mucous membranes, Good dentition, No lesions  NECK: Supple, No JVD, Normal thyroid  NERVOUS SYSTEM:  Alert & Oriented X3, Good concentration; Motor Strength 5/5 B/L upper and lower extremities; DTRs 2+ intact and symmetric  CHEST/LUNG: Clear to percussion bilaterally; No rales, rhonchi, wheezing, or rubs  HEART: Regular rate and rhythm; No murmurs, rubs, or gallops  ABDOMEN: Soft, Nontender, Nondistended; Bowel sounds present  EXTREMITIES:  2+ Peripheral Pulses, No clubbing, cyanosis, or edema  LYMPH: No lymphadenopathy noted  SKIN: No rashes or lesions    Care Discussed with Consultants/Other Providers [ ] YES  [ ] NO

## 2019-03-19 NOTE — CHART NOTE - NSCHARTNOTEFT_GEN_A_CORE
Called by RN due to bigeminy found on tele monitor.    Patient asymptomatic, resting comfortably in bed.    As per chart review, patient seems to have a history of this in the past.    plan:  - f/u Mg in the AM  - f/u Phos in the AM  - Discussed with senior resident, agrees with plan

## 2019-03-19 NOTE — OCCUPATIONAL THERAPY INITIAL EVALUATION ADULT - PERTINENT HX OF CURRENT PROBLEM, REHAB EVAL
79 y/o male s/p Left THR on 3/18/19 by Dr. Ambrose due to severe OA left hip. Pt s/p Right THR on 1/25/18.

## 2019-03-19 NOTE — OCCUPATIONAL THERAPY INITIAL EVALUATION ADULT - ORIENTATION, REHAB EVAL
Patient educated verbally regarding THP's, LE weight bearing status, d/c plan, DME needs & role of OT. Pt. provided with THR education folder including functional exercises, THR education & caregiver guide pamphlet. Pt educated regarding fall prevention and home/hospital safety. Pt educated on use of AE/DME recommended for safety & the need to call for assistance. Role of OT and pt goals discussed. Pt educated re: d/c plan & DME needs (SW/case mgmt notified via [employer] EMR)./oriented to person, place, time and situation

## 2019-03-19 NOTE — PROGRESS NOTE ADULT - SUBJECTIVE AND OBJECTIVE BOX
GRETA HAY      78y   male  MRN-[id number]    ORTHOPEDIC SURGERY / DR. WALL    POD # 1    Vital Signs Last 24 Hrs  T(C): 36.4 (19 Mar 2019 05:00), Max: 36.7 (18 Mar 2019 12:16)  T(F): 97.5 (19 Mar 2019 05:00), Max: 98.1 (18 Mar 2019 12:16)  HR: 62 (19 Mar 2019 05:00) (60 - 72)  BP: 116/68 (19 Mar 2019 05:00) (85/49 - 125/69)  BP(mean): --  RR: 17 (19 Mar 2019 05:00) (12 - 17)  SpO2: 98% (19 Mar 2019 05:00) (95% - 98%)    LEFT HIP :    DRESSING DRY AND INTACT  GOOD MOTOR TO LEFT LOWER EXTREMITY  NEURO-VASCULAR STATUS INTACT  NO CALF TENDERNESS    POST OP     Hemoglobin: 10.1 (03-18 @ 12:51)  Hematocrit: 29.4 (03-18 @ 12:51)    03-18    142  |  108  |  14  ----------------------------<  137<H>  3.9   |  29  |  0.95    Ca    8.0<L>      18 Mar 2019 14:47  Mg     1.6     03-18                            ASSESSMENT &  PLAN:  POD # 1 S/P LEFT TOTAL HIP REPLACEMENT    WEIGHT  BEARING AS TOLERATED, OOB AND AMBULATE, PHYSICAL THERAPY   DVT PROPHYLAXIS XARELTO 10 MG DAILY   INCENTIVE SPIROMETRY   DISCHARGE PLANNING TO HOME WITH HOME CARE   F/U LABS

## 2019-03-19 NOTE — DISCHARGE NOTE PROVIDER - NSDCCPTREATMENT_GEN_ALL_CORE_FT
PRINCIPAL PROCEDURE  Procedure: Total left hip replacement  Findings and Treatment: WEIGHT BEARING AS TOLERATED.  FOLLOW UP WITH DR. WALL NEXT THURSDAY 03/28/2019 CALL [phone] FOR AN APPOINTMENT.  KEEP HIP AQUACEL  DRESSING  ON DRY AND CLEAN, IT WILL BE CHANGED OR REMOVED ON YOUR NEXT OFFICE VISIT .

## 2019-03-19 NOTE — DISCHARGE NOTE PROVIDER - NSDCACTIVITY_GEN_ALL_CORE
Stairs allowed/Showering allowed/Walking - Outdoors allowed/No heavy lifting/straining/Walking - Indoors allowed

## 2019-03-19 NOTE — DISCHARGE NOTE PROVIDER - NSDCCPCAREPLAN_GEN_ALL_CORE_FT
PRINCIPAL DISCHARGE DIAGNOSIS  Diagnosis: Osteoarthritis of left hip  Assessment and Plan of Treatment:

## 2019-03-19 NOTE — DISCHARGE NOTE PROVIDER - HOSPITAL COURSE
THIS IS A CASE OF A 79 YO MALE EVALUATED IN THE OFFICE FOR LEFT HIP PAIN SECONDARY TO SEVERE ENDSTAGE OSTEOARTHRITIS.          PAST MEDICAL HISTORY: HTN, OA, GERD         HOSPITAL COURSE: AFTER THE RISK AND BENEFITS OF SURGICAL INTERVENTION IN DETAILS WERE DISCUSSED WITH THE PATIENT, A CONSENT WAS OBTAINED. AFTER OBTAINING MEDICAL CLEARANCE AND PREOPERATIVE EVALUATION, THE PATIENT WAS TAKEN TO THE OPERATING ROOM ON 03/18/2019 AND THE PATIENT UNDERWENT A  LEFT TOTAL HIP REPLACEMENT. POSTOPERATIVE PHASE, THE PATIENT WAS ANTICOAGULATED WITH XARELTO  AND WAS GIVEN 24 HOURS OF IV ANTIBIOTICS. A SOCIAL SERVICE CONSULT WAS OBTAINED FOR DISCHARGE PLANNING.  A PHYSICAL THERAPY CONSULT WAS OBTAINED FOR WEIGHT BEARING AS TOLERATED, HIP PRECAUTIONS.  DUE TO ANEMIA OF ACUTE BLOOD LOSS POST OP IRON SUPPLEMENT GIVEN.        DISPOSITION : HOME WITH HOME CARE,  FOLLOW UP WITH DR. WALL AS OUTPATIENT.

## 2019-03-19 NOTE — DISCHARGE NOTE PROVIDER - CARE PROVIDER_API CALL
Marcus Ambrose)  Orthopaedic Surgery  [address] [place] [address]  Phone: [phone]  Fax: [phone]  Follow Up Time:

## 2019-03-19 NOTE — OCCUPATIONAL THERAPY INITIAL EVALUATION ADULT - ADDITIONAL COMMENTS
Pt lives in a house with 4 steps to enter with bilateral handrails (far apart). 3 steps with no handrail to access bedroom and bathroom. Pt has a stall shower with built-in seat. Pt does not own any DME.

## 2019-03-19 NOTE — PROGRESS NOTE ADULT - SUBJECTIVE AND OBJECTIVE BOX
NP Progress Note     [facility] -- Layton Mijares, Tommy Miller Pannella, Patel, Savella  Office # [phone]      Follow Up: Ventricular bigeminy    HPI:  78 y.o. male with h/o HTN, GERD, generalized arthritis s/p Right hip replacement (01/2018) c/o worsening Left hip pain. An x-ray of Left hip revealed severe osteoarthritis. He is scheduled for Left Total Hip Replacement. (07 Mar 2019 13:58)        Subjective/Observations: Pt. seen and examined and evaluated. Pt. resting comfortably in bed in NAD, with no respiratory distress, no chest pain, dyspnea, palpitations, PND, or orthopnea.      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 650 milliGRAM(s) Oral every 8 hours  amLODIPine   Tablet 10 milliGRAM(s) Oral daily  ascorbic acid 500 milliGRAM(s) Oral two times a day  celecoxib 200 milliGRAM(s) Oral two times a day  docusate sodium 100 milliGRAM(s) Oral three times a day  fenofibrate Tablet 145 milliGRAM(s) Oral daily  ferrous    sulfate 325 milliGRAM(s) Oral three times a day with meals  folic acid 1 milliGRAM(s) Oral daily  gabapentin 100 milliGRAM(s) Oral two times a day  levothyroxine 25 MICROGram(s) Oral daily  levothyroxine 200 MICROGram(s) Oral daily  losartan 100 milliGRAM(s) Oral daily  mercaptopurine 75 milliGRAM(s) Oral daily  multivitamin 1 Tablet(s) Oral daily  pantoprazole    Tablet 40 milliGRAM(s) Oral before breakfast  rivaroxaban 10 milliGRAM(s) Oral daily    MEDICATIONS  (PRN):  ALBUTerol    90 MICROgram(s) HFA Inhaler 2 Puff(s) Inhalation every 6 hours PRN Shortness of Breath and/or Wheezing  HYDROmorphone  Injectable 0.5 milliGRAM(s) IV Push every 4 hours PRN Severe Pain (7 - 10)  ondansetron Injectable 4 milliGRAM(s) IV Push every 6 hours PRN Nausea and/or Vomiting  traMADol 50 milliGRAM(s) Oral every 6 hours PRN Mild Pain (1 - 3)  traMADol 100 milliGRAM(s) Oral every 6 hours PRN Moderate Pain (4 - 6)      Allergies:  penicillin (Hives; Urticaria)  shellfish (Rash; Anaphylaxis; Hives; Short breath)      Vital Signs Last 24 Hrs  T(C): 36.6 (19 Mar 2019 11:20), Max: 36.7 (18 Mar 2019 14:35)  T(F): 97.9 (19 Mar 2019 11:20), Max: 98 (18 Mar 2019 14:35)  HR: 60 (19 Mar 2019 11:20) (59 - 72)  BP: 101/62 (19 Mar 2019 11:20) (95/52 - 130/78)  BP(mean): --  RR: 18 (19 Mar 2019 11:20) (14 - 18)  SpO2: 96% (19 Mar 2019 11:20) (95% - 100%)    I&O's Summary    18 Mar 2019 07:01  -  19 Mar 2019 07:00  --------------------------------------------------------  IN: 2163 mL / OUT: 650 mL / NET: 1513 mL          LABS: All Labs Reviewed:                        9.5    4.55  )-----------( 301      ( 19 Mar 2019 07:37 )             28.4              19 Mar 2019 07:37    141    |  104    |  12     ----------------------------<  107    3.6     |  30     |  0.95     Ca    8.2        19 Mar 2019 07:37  Phos  3.1       19 Mar 2019 07:37  Mg     1.7       19 Mar 2019 07:37        Interpretation of Telemetry: Overnight on telemetry NSR 60's      Physical Exam:  Appearance: [ ] Normal  [ ] abnormal [ ] NAD   Eyes: [ ] PERRL [ ] EOMI  HEENT: [ ] Normal [ ] Abnormal oral mucosa [ ]NC/AT  Cardiovascular: [ ] S1 [ ] S2 [ ] RRR [ ] m/r/g [ ]edema [ ] JVP  Procedural Access Site: [ ]  hematoma [ ] tender to palpation [ ] 2+ pulse [ ] bruit [ ] Ecchymosis  Respiratory: [ ] Clear to auscultation bilaterally  Gastrointestinal: [ ] Soft [ ] tenderness[ ] distension [ ] BS  Musculoskeletal: [ ] clubbing [ ] joint deformity   Neurologic: [ ] Non-focal  Lymphatic: [ ] lymphadenopathy  Psychiatry: [ ] AAOx3  [ ] confused [ ] disoriented [ ] Mood & affect appropriate  Skin: [ ]  rashes [ ] ecchymoses [ ] cyanosis NP Progress Note     [facility] -- Layton Mijares, Tommy Miller Pannella, Patel, Savella  Office # [phone]      Follow Up: Ventricular bigeminy    HPI:  78 y.o. male with h/o HTN, GERD, generalized arthritis s/p Right hip replacement (01/2018) c/o worsening Left hip pain. An x-ray of Left hip revealed severe osteoarthritis. He is scheduled for Left Total Hip Replacement. (07 Mar 2019 13:58)        Subjective/Observations: Pt. seen and examined and evaluated. Pt. resting comfortably in bed in NAD, with no respiratory distress, no chest pain, dyspnea, palpitations, PND, or orthopnea.      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 650 milliGRAM(s) Oral every 8 hours  amLODIPine   Tablet 10 milliGRAM(s) Oral daily  ascorbic acid 500 milliGRAM(s) Oral two times a day  celecoxib 200 milliGRAM(s) Oral two times a day  docusate sodium 100 milliGRAM(s) Oral three times a day  fenofibrate Tablet 145 milliGRAM(s) Oral daily  ferrous    sulfate 325 milliGRAM(s) Oral three times a day with meals  folic acid 1 milliGRAM(s) Oral daily  gabapentin 100 milliGRAM(s) Oral two times a day  levothyroxine 25 MICROGram(s) Oral daily  levothyroxine 200 MICROGram(s) Oral daily  losartan 100 milliGRAM(s) Oral daily  mercaptopurine 75 milliGRAM(s) Oral daily  multivitamin 1 Tablet(s) Oral daily  pantoprazole    Tablet 40 milliGRAM(s) Oral before breakfast  rivaroxaban 10 milliGRAM(s) Oral daily    MEDICATIONS  (PRN):  ALBUTerol    90 MICROgram(s) HFA Inhaler 2 Puff(s) Inhalation every 6 hours PRN Shortness of Breath and/or Wheezing  HYDROmorphone  Injectable 0.5 milliGRAM(s) IV Push every 4 hours PRN Severe Pain (7 - 10)  ondansetron Injectable 4 milliGRAM(s) IV Push every 6 hours PRN Nausea and/or Vomiting  traMADol 50 milliGRAM(s) Oral every 6 hours PRN Mild Pain (1 - 3)  traMADol 100 milliGRAM(s) Oral every 6 hours PRN Moderate Pain (4 - 6)      Allergies:  penicillin (Hives; Urticaria)  shellfish (Rash; Anaphylaxis; Hives; Short breath)      Vital Signs Last 24 Hrs  T(C): 36.6 (19 Mar 2019 11:20), Max: 36.7 (18 Mar 2019 14:35)  T(F): 97.9 (19 Mar 2019 11:20), Max: 98 (18 Mar 2019 14:35)  HR: 60 (19 Mar 2019 11:20) (59 - 72)  BP: 101/62 (19 Mar 2019 11:20) (95/52 - 130/78)  BP(mean): --  RR: 18 (19 Mar 2019 11:20) (14 - 18)  SpO2: 96% (19 Mar 2019 11:20) (95% - 100%)    I&O's Summary    18 Mar 2019 07:01  -  19 Mar 2019 07:00  --------------------------------------------------------  IN: 2163 mL / OUT: 650 mL / NET: 1513 mL          LABS: All Labs Reviewed:                        9.5    4.55  )-----------( 301      ( 19 Mar 2019 07:37 )             28.4              19 Mar 2019 07:37    141    |  104    |  12     ----------------------------<  107    3.6     |  30     |  0.95     Ca    8.2        19 Mar 2019 07:37  Phos  3.1       19 Mar 2019 07:37  Mg     1.7       19 Mar 2019 07:37        Interpretation of Telemetry: Overnight on telemetry NSR 60's      Physical Exam:  Appearance: [ ] Normal  [ ] abnormal [X ] NAD   Eyes: [ ] PERRL [ ] EOMI  HEENT: [ ] Normal [ ] Abnormal oral mucosa [ ]NC/AT  Cardiovascular: [X ] S1 [X ] S2 [ ] RRR [ ] m/r/g [ ]edema [ ] JVP  Procedural Access Site: [ ]  hematoma [ ] tender to palpation [ ] 2+ pulse [ ] bruit [ ] Ecchymosis  Respiratory: [X ] Clear to auscultation bilaterally  Gastrointestinal: [ ] Soft [ ] tenderness[ ] distension [ ] BS  Musculoskeletal: [ ] clubbing [ ] joint deformity   Neurologic: [ ] Non-focal  Lymphatic: [ ] lymphadenopathy  Psychiatry: [X ] AAOx3  [ ] confused [ ] disoriented [ ] Mood & affect appropriate  Skin: [ ]  rashes [ ] ecchymoses [ ] cyanosis

## 2019-03-19 NOTE — PROGRESS NOTE ADULT - ASSESSMENT
78 male adm for and now s/p left thr for severe oa.  bigeminy in PACU as noted- lytes and ekg ok  colitis- on 6-MP  htn- bp well controlled  monitoring on remote tele  oa- s/p left thr.  dvt proph w xarelto.  inc yarely encouraged

## 2019-03-19 NOTE — DISCHARGE NOTE NURSING/CASE MANAGEMENT/SOCIAL WORK - NSDCDPATPORTLINK_GEN_ALL_CORE
You can access the [facility] Patient Portal, offered by [facility], by registering with the following website: [email or web address]

## 2019-03-19 NOTE — PROGRESS NOTE ADULT - ASSESSMENT
78 year old man with hypertension, hyperlipidemia, GERD, colitis, s/p L THR, now with PVCs in the PACU:    - No current cardiac symptoms  - He seems to have a history of this in the past  - Monitor on remote telemetry overnight  - Check echocardiogram in AM  - Monitor and replete lytes as needed  - PVCs likely catechol mediated from stress from surgery  - BP OK.  amlodipine and losartan on hold.  Resume when able  - Aspirin on hold.  To be resumed when ok from surgical standpoint. No rush from a cardiac perspective  - No evidence of active ischemia or volume overload  - Fenofibrate to be resumed when able  - Check TSH  - To follow closely with you while admitted.    Christi Whipple DNP, ANP-c  Cardiology 78 year old man with hypertension, hyperlipidemia, GERD, colitis, s/p L THR, now with PVCs in the PACU:    - No current cardiac symptoms  - He seems to have a history of this in the past  - Monitor on remote telemetry overnight  - Check echocardiogram in AM  - Monitor and replete lytes as needed  - PVCs likely catechol mediated from stress from surgery  - Continue Norvasc 10mg  and losartan 100mg   - Aspirin on hold.  To be resumed when ok from surgical standpoint. No rush from a cardiac perspective  - No evidence of active ischemia or volume overload  - Continue Fenofibrate    - Check TSH  - To follow closely with you while admitted.    Christi Whipple DNP, ANP-c  Cardiology 78 year old man with hypertension, hyperlipidemia, GERD, colitis, s/p L THR, now with PVCs in the PACU:    - No current cardiac symptoms  - He seems to have a history of this in the past  - Monitor on remote telemetry overnight  - await echocardiogram  - Monitor and replete lytes as needed  - PVCs likely catechol mediated from stress from surgery  - Continue Norvasc 10mg  and losartan 100mg   - Aspirin on hold.  To be resumed when ok from surgical standpoint. No rush from a cardiac perspective  - No evidence of active ischemia or volume overload  - Continue Fenofibrate    - Check TSH  - To follow closely with you while admitted.    Christi Whipple DNP, ANP-c  Cardiology

## 2019-03-20 VITALS
TEMPERATURE: 98 F | HEART RATE: 66 BPM | OXYGEN SATURATION: 98 % | DIASTOLIC BLOOD PRESSURE: 74 MMHG | RESPIRATION RATE: 16 BRPM | SYSTOLIC BLOOD PRESSURE: 128 MMHG

## 2019-03-20 LAB
HCT VFR BLD CALC: 27.2 % — LOW (ref 39–50)
HGB BLD-MCNC: 9.3 G/DL — LOW (ref 13–17)
PHOSPHATE SERPL-MCNC: 3 MG/DL — SIGNIFICANT CHANGE UP (ref 2.5–4.5)
SURGICAL PATHOLOGY STUDY: SIGNIFICANT CHANGE UP

## 2019-03-20 PROCEDURE — C1713: CPT

## 2019-03-20 PROCEDURE — 85027 COMPLETE CBC AUTOMATED: CPT

## 2019-03-20 PROCEDURE — 85014 HEMATOCRIT: CPT

## 2019-03-20 PROCEDURE — 73501 X-RAY EXAM HIP UNI 1 VIEW: CPT

## 2019-03-20 PROCEDURE — 97116 GAIT TRAINING THERAPY: CPT

## 2019-03-20 PROCEDURE — 97162 PT EVAL MOD COMPLEX 30 MIN: CPT

## 2019-03-20 PROCEDURE — 84100 ASSAY OF PHOSPHORUS: CPT

## 2019-03-20 PROCEDURE — 36415 COLL VENOUS BLD VENIPUNCTURE: CPT

## 2019-03-20 PROCEDURE — 97530 THERAPEUTIC ACTIVITIES: CPT

## 2019-03-20 PROCEDURE — 97535 SELF CARE MNGMENT TRAINING: CPT

## 2019-03-20 PROCEDURE — C1776: CPT

## 2019-03-20 PROCEDURE — 85018 HEMOGLOBIN: CPT

## 2019-03-20 PROCEDURE — 80048 BASIC METABOLIC PNL TOTAL CA: CPT

## 2019-03-20 PROCEDURE — 82962 GLUCOSE BLOOD TEST: CPT

## 2019-03-20 PROCEDURE — 93005 ELECTROCARDIOGRAM TRACING: CPT

## 2019-03-20 PROCEDURE — 99232 SBSQ HOSP IP/OBS MODERATE 35: CPT

## 2019-03-20 PROCEDURE — 83735 ASSAY OF MAGNESIUM: CPT

## 2019-03-20 PROCEDURE — 97165 OT EVAL LOW COMPLEX 30 MIN: CPT

## 2019-03-20 RX ORDER — HYDROMORPHONE HYDROCHLORIDE 2 MG/ML
1 INJECTION INTRAMUSCULAR; INTRAVENOUS; SUBCUTANEOUS
Qty: 20 | Refills: 0
Start: 2019-03-20

## 2019-03-20 RX ADMIN — GABAPENTIN 100 MILLIGRAM(S): 400 CAPSULE ORAL at 05:20

## 2019-03-20 RX ADMIN — HYDROMORPHONE HYDROCHLORIDE 0.5 MILLIGRAM(S): 2 INJECTION INTRAMUSCULAR; INTRAVENOUS; SUBCUTANEOUS at 00:40

## 2019-03-20 RX ADMIN — Medication 145 MILLIGRAM(S): at 12:19

## 2019-03-20 RX ADMIN — RIVAROXABAN 10 MILLIGRAM(S): KIT at 12:19

## 2019-03-20 RX ADMIN — Medication 650 MILLIGRAM(S): at 12:20

## 2019-03-20 RX ADMIN — CELECOXIB 200 MILLIGRAM(S): 200 CAPSULE ORAL at 06:00

## 2019-03-20 RX ADMIN — Medication 325 MILLIGRAM(S): at 12:19

## 2019-03-20 RX ADMIN — Medication 100 MILLIGRAM(S): at 05:20

## 2019-03-20 RX ADMIN — PANTOPRAZOLE SODIUM 40 MILLIGRAM(S): 20 TABLET, DELAYED RELEASE ORAL at 05:21

## 2019-03-20 RX ADMIN — Medication 650 MILLIGRAM(S): at 06:00

## 2019-03-20 RX ADMIN — HYDROMORPHONE HYDROCHLORIDE 0.5 MILLIGRAM(S): 2 INJECTION INTRAMUSCULAR; INTRAVENOUS; SUBCUTANEOUS at 00:09

## 2019-03-20 RX ADMIN — Medication 650 MILLIGRAM(S): at 05:21

## 2019-03-20 RX ADMIN — Medication 25 MICROGRAM(S): at 05:20

## 2019-03-20 RX ADMIN — Medication 1 MILLIGRAM(S): at 12:19

## 2019-03-20 RX ADMIN — HYDROMORPHONE HYDROCHLORIDE 0.5 MILLIGRAM(S): 2 INJECTION INTRAMUSCULAR; INTRAVENOUS; SUBCUTANEOUS at 10:38

## 2019-03-20 RX ADMIN — Medication 200 MICROGRAM(S): at 05:21

## 2019-03-20 RX ADMIN — Medication 1 TABLET(S): at 12:20

## 2019-03-20 RX ADMIN — Medication 325 MILLIGRAM(S): at 07:58

## 2019-03-20 RX ADMIN — LOSARTAN POTASSIUM 100 MILLIGRAM(S): 100 TABLET, FILM COATED ORAL at 05:20

## 2019-03-20 RX ADMIN — CELECOXIB 200 MILLIGRAM(S): 200 CAPSULE ORAL at 05:21

## 2019-03-20 RX ADMIN — Medication 500 MILLIGRAM(S): at 05:20

## 2019-03-20 RX ADMIN — AMLODIPINE BESYLATE 10 MILLIGRAM(S): 2.5 TABLET ORAL at 05:20

## 2019-03-20 NOTE — PROGRESS NOTE ADULT - SUBJECTIVE AND OBJECTIVE BOX
[facility] -- Layton Mijares, Tommy Miller Pannella, Patel, Savella  Office # [phone]    Follow Up: Ventricular bigeminy    Subjective/Observations: Awake and alert, on RA.  Denies any palpitations, CP, SOB, PINTO, or orthopnea.  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 650 milliGRAM(s) Oral every 8 hours  amLODIPine   Tablet 10 milliGRAM(s) Oral daily  ascorbic acid 500 milliGRAM(s) Oral two times a day  celecoxib 200 milliGRAM(s) Oral two times a day  docusate sodium 100 milliGRAM(s) Oral three times a day  fenofibrate Tablet 145 milliGRAM(s) Oral daily  ferrous    sulfate 325 milliGRAM(s) Oral three times a day with meals  folic acid 1 milliGRAM(s) Oral daily  gabapentin 100 milliGRAM(s) Oral two times a day  levothyroxine 25 MICROGram(s) Oral daily  levothyroxine 200 MICROGram(s) Oral daily  losartan 100 milliGRAM(s) Oral daily  mercaptopurine 75 milliGRAM(s) Oral daily  multivitamin 1 Tablet(s) Oral daily  pantoprazole    Tablet 40 milliGRAM(s) Oral before breakfast  rivaroxaban 10 milliGRAM(s) Oral daily    MEDICATIONS  (PRN):  ALBUTerol    90 MICROgram(s) HFA Inhaler 2 Puff(s) Inhalation every 6 hours PRN Shortness of Breath and/or Wheezing  HYDROmorphone  Injectable 0.5 milliGRAM(s) IV Push every 4 hours PRN Severe Pain (7 - 10)  ondansetron Injectable 4 milliGRAM(s) IV Push every 6 hours PRN Nausea and/or Vomiting  traMADol 50 milliGRAM(s) Oral every 6 hours PRN Mild Pain (1 - 3)  traMADol 100 milliGRAM(s) Oral every 6 hours PRN Moderate Pain (4 - 6)    Allergies    penicillin (Hives; Urticaria)  shellfish (Rash; Anaphylaxis; Hives; Short breath)    Intolerances    Vital Signs Last 24 Hrs  T(C): 36.9 (20 Mar 2019 04:12), Max: 37.4 (20 Mar 2019 00:17)  T(F): 98.5 (20 Mar 2019 04:12), Max: 99.3 (20 Mar 2019 00:17)  HR: 61 (20 Mar 2019 04:12) (60 - 74)  BP: 136/73 (20 Mar 2019 04:12) (101/62 - 156/79)  BP(mean): --  RR: 16 (20 Mar 2019 04:12) (16 - 18)  SpO2: 99% (20 Mar 2019 04:12) (96% - 99%)    I&O's Summary    19 Mar 2019 07:01  -  20 Mar 2019 07:00  --------------------------------------------------------  IN: 600 mL / OUT: 0 mL / NET: 600 mL    PHYSICAL EXAM:  TELE: NSR  Constitutional: NAD, awake and alert, obese  HEENT: Moist Mucous Membranes, Anicteric  Pulmonary: Non-labored, breath sounds are clear bilaterally, No wheezing, rales or rhonchi  Cardiovascular: Regular, S1 and S2, No murmurs, rubs, gallops or clicks  Gastrointestinal: Bowel Sounds present, soft, nontender.   Lymph: No peripheral edema. No lymphadenopathy.  Skin: No visible rashes or ulcers.  Psych:  Mood & affect appropriate    LABS: All Labs Reviewed:                        9.3    x     )-----------( x        ( 20 Mar 2019 08:10 )             27.2                         9.5    4.55  )-----------( 301      ( 19 Mar 2019 07:37 )             28.4                         10.1   7.75  )-----------( 305      ( 18 Mar 2019 12:51 )             29.4     19 Mar 2019 07:37    141    |  104    |  12     ----------------------------<  107    3.6     |  30     |  0.95   18 Mar 2019 14:47    142    |  108    |  14     ----------------------------<  137    3.9     |  29     |  0.95     Ca    8.2        19 Mar 2019 07:37  Ca    8.0        18 Mar 2019 14:47  Phos  3.0       20 Mar 2019 07:53  Phos  3.1       19 Mar 2019 07:37  Mg     1.7       19 Mar 2019 07:37  Mg     1.6       18 Mar 2019 14:47    < from: Xray Chest PA/Lat Pre Surgical Testing 2 Views (01.16.18 @ 17:31) >    EXAM:  XR CHEST PA LAT PST 2V                            PROCEDURE DATE:  01/16/2018        INTERPRETATION:      Clinical history: Preoperative for right hip replacement.    PA and lateral views are submitted.     Comparison: none    The heart size is within normal limits.  There is a mildly tortuous aorta.    There are multilevel degenerative changes of the thoracic spine.    No focal consolidation, pleural effusion or pneumothorax is noted.    Impression:    No acute pulmonary process demonstrated.    TANNER ISRAEL M.D., ATTENDING RADIOLOGIST  This document has been electronically signed. Jan 17 2018  8:22AM      < end of copied text >

## 2019-03-20 NOTE — PROGRESS NOTE ADULT - ASSESSMENT
78 male adm for and now s/p left thr for severe oa.  bigeminy in PACU as noted- lytes and ekg ok  colitis- on 6-MP  htn- bp well controlled  monitoring on remote tele  oa- s/p left thr.  dvt proph w xarelto.  inc yarely encouraged  advance care planning discussed- health care proxy forms reviewed  med stable for dc home

## 2019-03-20 NOTE — PROGRESS NOTE ADULT - ASSESSMENT
78 year old man with hypertension, hyperlipidemia, GERD, colitis, s/p L THR, now with PVCs in the PACU:    - No current cardiac symptoms.  No further ventricular ectopies on tele.  Discontinue telemetry  - He seems to have a history of this in the past.   - Monitor electrolytes.  Replete to keep K>4 and Mag>2  - PVCs likely catechol mediated from stress from surgery  - TTE can be done as outpatient    - Continue Norvasc 10mg  and losartan 100mg for BP control    - No evidence of active ischemia or volume overload  - Continue Fenofibrate    - Check TSH  - To follow closely with you while admitted.    Macy Boone NP  Cardiology

## 2019-03-20 NOTE — PROGRESS NOTE ADULT - SUBJECTIVE AND OBJECTIVE BOX
GRETA HAY      78y   male  MRN-[id number]    ORTHOPEDIC SURGERY / DR. WALL    POD # 2    Vital Signs Last 24 Hrs  T(C): 36.9 (20 Mar 2019 04:12), Max: 37.4 (20 Mar 2019 00:17)  T(F): 98.5 (20 Mar 2019 04:12), Max: 99.3 (20 Mar 2019 00:17)  HR: 61 (20 Mar 2019 04:12) (59 - 74)  BP: 136/73 (20 Mar 2019 04:12) (101/62 - 156/79)  BP(mean): --  RR: 16 (20 Mar 2019 04:12) (16 - 18)  SpO2: 99% (20 Mar 2019 04:12) (96% - 100%)    LEFT HIP :    WOUND DRY AND INTACT  SOME EDEMA  GOOD MOTOR TO LEFT LOWER EXTREMITY  NEURO-VASCULAR STATUS INTACT  NO CALF TENDERNESS    Hemoglobin: 9.5 (03-19 @ 07:37)  Hemoglobin: 10.1 (03-18 @ 12:51)    Hematocrit: 28.4 (03-19 @ 07:37)  Hematocrit: 29.4 (03-18 @ 12:51)    03-19    141  |  104  |  12  ----------------------------<  107<H>  3.6   |  30  |  0.95    Ca    8.2<L>      19 Mar 2019 07:37  Phos  3.1     03-19  Mg     1.7     03-19                         ASSESSMENT &  PLAN:  POD # 2 S/P LEFT TOTAL HIP REPLACEMENT    WEIGHT  BEARING AS TOLERATED, OOB AND AMBULATE, PHYSICAL THERAPY   DVT PROPHYLAXIS XARELTO 10 MG DAILY   INCENTIVE SPIROMETRY   DISCHARGE PLANNING TO HOME WITH HOME CARE TODAY AFTER H/H RESULTS   NEW AQUACEL DRESSING APPLIED

## 2019-03-20 NOTE — PROGRESS NOTE ADULT - SUBJECTIVE AND OBJECTIVE BOX
Patient is a 78y old  Male who presents with a chief complaint of LEFT HIP ENDSTAGE OSTEOARTHRITIS   LEFT TOTAL HIP REPLACEMENT (19 Mar 2019 16:06)  feels well, without complaints    INTERVAL HPI/OVERNIGHT EVENTS:  T(C): 36.6 (03-20-19 @ 07:53), Max: 37.4 (03-20-19 @ 00:17)  HR: 66 (03-20-19 @ 07:53) (61 - 74)  BP: 128/74 (03-20-19 @ 07:53) (128/74 - 156/79)  RR: 16 (03-20-19 @ 07:53) (16 - 17)  SpO2: 98% (03-20-19 @ 07:53) (97% - 99%)  Wt(kg): --  I&O's Summary    19 Mar 2019 07:01  -  20 Mar 2019 07:00  --------------------------------------------------------  IN: 600 mL / OUT: 0 mL / NET: 600 mL        LABS:                        9.3    x     )-----------( x        ( 20 Mar 2019 08:10 )             27.2     03-19    141  |  104  |  12  ----------------------------<  107<H>  3.6   |  30  |  0.95    Ca    8.2<L>      19 Mar 2019 07:37  Phos  3.0     03-20  Mg     1.7     03-19          CAPILLARY BLOOD GLUCOSE                MEDICATIONS  (STANDING):  acetaminophen   Tablet .. 650 milliGRAM(s) Oral every 8 hours  amLODIPine   Tablet 10 milliGRAM(s) Oral daily  ascorbic acid 500 milliGRAM(s) Oral two times a day  celecoxib 200 milliGRAM(s) Oral two times a day  docusate sodium 100 milliGRAM(s) Oral three times a day  fenofibrate Tablet 145 milliGRAM(s) Oral daily  ferrous    sulfate 325 milliGRAM(s) Oral three times a day with meals  folic acid 1 milliGRAM(s) Oral daily  gabapentin 100 milliGRAM(s) Oral two times a day  levothyroxine 25 MICROGram(s) Oral daily  levothyroxine 200 MICROGram(s) Oral daily  losartan 100 milliGRAM(s) Oral daily  mercaptopurine 75 milliGRAM(s) Oral daily  multivitamin 1 Tablet(s) Oral daily  pantoprazole    Tablet 40 milliGRAM(s) Oral before breakfast  rivaroxaban 10 milliGRAM(s) Oral daily    MEDICATIONS  (PRN):  ALBUTerol    90 MICROgram(s) HFA Inhaler 2 Puff(s) Inhalation every 6 hours PRN Shortness of Breath and/or Wheezing  HYDROmorphone  Injectable 0.5 milliGRAM(s) IV Push every 4 hours PRN Severe Pain (7 - 10)  ondansetron Injectable 4 milliGRAM(s) IV Push every 6 hours PRN Nausea and/or Vomiting  traMADol 50 milliGRAM(s) Oral every 6 hours PRN Mild Pain (1 - 3)  traMADol 100 milliGRAM(s) Oral every 6 hours PRN Moderate Pain (4 - 6)      REVIEW OF SYSTEMS:  CONSTITUTIONAL: No fever, weight loss, or fatigue  EYES: No eye pain, visual disturbances, or discharge  ENMT:  No difficulty hearing, tinnitus, vertigo; No sinus or throat pain  NECK: No pain or stiffness  RESPIRATORY: No cough, wheezing, chills or hemoptysis; No shortness of breath  CARDIOVASCULAR: No chest pain, palpitations, dizziness, or leg swelling  GASTROINTESTINAL: No abdominal or epigastric pain. No nausea, vomiting, or hematemesis; No diarrhea or constipation. No melena or hematochezia.  GENITOURINARY: No dysuria, frequency, hematuria, or incontinence  NEUROLOGICAL: No headaches, memory loss, loss of strength, numbness, or tremors  SKIN: No itching, burning, rashes, or lesions   LYMPH NODES: No enlarged glands  ENDOCRINE: No heat or cold intolerance; No hair loss  MUSCULOSKELETAL: No joint pain or swelling; No muscle, back, or extremity pain  PSYCHIATRIC: No depression, anxiety, mood swings, or difficulty sleeping  HEME/LYMPH: No easy bruising, or bleeding gums  ALLERY AND IMMUNOLOGIC: No hives or eczema    RADIOLOGY & ADDITIONAL TESTS:    Imaging Personally Reviewed:  [ ] YES  [ ] NO    Consultant(s) Notes Reviewed:  [ ] YES  [ ] NO    PHYSICAL EXAM:  GENERAL: NAD, well-groomed, well-developed  HEAD:  Atraumatic, Normocephalic  EYES: EOMI, PERRLA, conjunctiva and sclera clear  ENMT: No tonsillar erythema, exudates, or enlargement; Moist mucous membranes, Good dentition, No lesions  NECK: Supple, No JVD, Normal thyroid  NERVOUS SYSTEM:  Alert & Oriented X3, Good concentration; Motor Strength 5/5 B/L upper and lower extremities; DTRs 2+ intact and symmetric  CHEST/LUNG: Clear to percussion bilaterally; No rales, rhonchi, wheezing, or rubs  HEART: Regular rate and rhythm; No murmurs, rubs, or gallops  ABDOMEN: Soft, Nontender, Nondistended; Bowel sounds present  EXTREMITIES:  2+ Peripheral Pulses, No clubbing, cyanosis, or edema  LYMPH: No lymphadenopathy noted  SKIN: No rashes or lesions    Care Discussed with Consultants/Other Providers [ ] YES  [ ] NO

## 2020-02-16 NOTE — H&P PST ADULT - LYMPH NODES
Arrived via EMS Autistic pt non-verbal, ilana reported diarrhea and decreased appetite x 2 days, sitter states pt is just not acting like himself and she wants him checked out.       LOC: The patient is awake and alert, unable to assess orientation, pt non-verbal and Autistic     APPEARANCE: Patient resting comfortably in no acute distress.  Patient is clean and well groomed.    SKIN: The skin is warm and dry; color consistent with ethnicity.  Patient has normal skin turgor and moist mucus membranes.  Skin intact; no breakdown or bruising noted.     MUSCULOSKELETAL: Patient moving upper and lower extremities without difficulty.  Denies weakness.     RESPIRATORY: Airway is open and patent. Respirations spontaneous, even, easy, and non-labored.  Patient has a normal effort and rate.  No accessory muscle use noted. Denies cough.     CARDIAC:  Normal rhythm and rate noted.  No peripheral edema noted. No complaints of chest pain.      ABDOMEN: Soft and non tender to palpation.  No distention noted.     NEUROLOGIC: Eyes open spontaneously.  Behavior appropriate to situation.  Follows commands; facial expression symmetrical.  Purposeful motor response noted; normal sensation in all extremities.       not examined

## 2020-11-09 ENCOUNTER — TRANSCRIPTION ENCOUNTER (OUTPATIENT)
Age: 80
End: 2020-11-09

## 2022-04-10 ENCOUNTER — TRANSCRIPTION ENCOUNTER (OUTPATIENT)
Age: 82
End: 2022-04-10

## 2022-05-10 NOTE — DISCHARGE NOTE NURSING/CASE MANAGEMENT/SOCIAL WORK - NSSCTYPOFSERV_GEN_ALL_CORE
Show Topical Anesthesia Variable?: Yes Detail Level: Detailed Spray Paint Technique: No Duration Of Freeze Thaw-Cycle (Seconds): 5-10 Consent: The patient's consent was obtained including but not limited to risks of crusting, scabbing, blistering, scarring, darker or lighter pigmentary change, recurrence, incomplete removal and infection. Medical Necessity Clause: This procedure was medically necessary because the lesions that were treated were: Spray Paint Text: The liquid nitrogen was applied to the skin utilizing a spray paint frosting technique. Number Of Freeze-Thaw Cycles: 1 freeze-thaw cycle Medical Necessity Information: It is in your best interest to select a reason for this procedure from the list below. All of these items fulfill various CMS LCD requirements except the new and changing color options. Post-Care Instructions: I reviewed with the patient in detail post-care instructions. Patient is to wear sunprotection, and avoid picking at any of the treated lesions. Pt may apply Vaseline to crusted or scabbing areas. VN- PT- OT

## 2022-08-27 ENCOUNTER — INPATIENT (INPATIENT)
Facility: HOSPITAL | Age: 82
LOS: 3 days | Discharge: HOME | DRG: 342 | End: 2022-08-31
Attending: FAMILY MEDICINE | Admitting: SURGERY
Payer: MEDICARE

## 2022-08-27 ENCOUNTER — TRANSCRIPTION ENCOUNTER (OUTPATIENT)
Age: 82
End: 2022-08-27

## 2022-08-27 VITALS
RESPIRATION RATE: 18 BRPM | HEART RATE: 67 BPM | HEIGHT: 68 IN | SYSTOLIC BLOOD PRESSURE: 133 MMHG | OXYGEN SATURATION: 97 % | DIASTOLIC BLOOD PRESSURE: 74 MMHG | TEMPERATURE: 98 F | WEIGHT: 195.11 LBS

## 2022-08-27 DIAGNOSIS — Z41.9 ENCOUNTER FOR PROCEDURE FOR PURPOSES OTHER THAN REMEDYING HEALTH STATE, UNSPECIFIED: Chronic | ICD-10-CM

## 2022-08-27 DIAGNOSIS — Z96.641 PRESENCE OF RIGHT ARTIFICIAL HIP JOINT: Chronic | ICD-10-CM

## 2022-08-27 DIAGNOSIS — Z98.890 OTHER SPECIFIED POSTPROCEDURAL STATES: Chronic | ICD-10-CM

## 2022-08-27 LAB
ALBUMIN SERPL ELPH-MCNC: 4.2 G/DL — SIGNIFICANT CHANGE UP (ref 3.3–5)
ALP SERPL-CCNC: 63 U/L — SIGNIFICANT CHANGE UP (ref 40–120)
ALT FLD-CCNC: 17 U/L — SIGNIFICANT CHANGE UP (ref 12–78)
ANION GAP SERPL CALC-SCNC: 5 MMOL/L — SIGNIFICANT CHANGE UP (ref 5–17)
APPEARANCE UR: CLEAR — SIGNIFICANT CHANGE UP
AST SERPL-CCNC: 14 U/L — LOW (ref 15–37)
BACTERIA # UR AUTO: NEGATIVE — SIGNIFICANT CHANGE UP
BILIRUB SERPL-MCNC: 1.1 MG/DL — SIGNIFICANT CHANGE UP (ref 0.2–1.2)
BILIRUB UR-MCNC: NEGATIVE — SIGNIFICANT CHANGE UP
BUN SERPL-MCNC: 21 MG/DL — SIGNIFICANT CHANGE UP (ref 7–23)
CALCIUM SERPL-MCNC: 9.2 MG/DL — SIGNIFICANT CHANGE UP (ref 8.5–10.1)
CHLORIDE SERPL-SCNC: 101 MMOL/L — SIGNIFICANT CHANGE UP (ref 96–108)
CO2 SERPL-SCNC: 31 MMOL/L — SIGNIFICANT CHANGE UP (ref 22–31)
COLOR SPEC: YELLOW — SIGNIFICANT CHANGE UP
COMMENT - URINE: SIGNIFICANT CHANGE UP
CREAT SERPL-MCNC: 1 MG/DL — SIGNIFICANT CHANGE UP (ref 0.5–1.3)
DIFF PNL FLD: ABNORMAL
EGFR: 76 ML/MIN/1.73M2 — SIGNIFICANT CHANGE UP
EPI CELLS # UR: NEGATIVE — SIGNIFICANT CHANGE UP
GLUCOSE SERPL-MCNC: 101 MG/DL — HIGH (ref 70–99)
GLUCOSE UR QL: NEGATIVE — SIGNIFICANT CHANGE UP
HCT VFR BLD CALC: 37.5 % — LOW (ref 39–50)
HGB BLD-MCNC: 12.8 G/DL — LOW (ref 13–17)
KETONES UR-MCNC: NEGATIVE — SIGNIFICANT CHANGE UP
LACTATE SERPL-SCNC: 1.2 MMOL/L — SIGNIFICANT CHANGE UP (ref 0.7–2)
LEUKOCYTE ESTERASE UR-ACNC: NEGATIVE — SIGNIFICANT CHANGE UP
LIDOCAIN IGE QN: 83 U/L — SIGNIFICANT CHANGE UP (ref 73–393)
MCHC RBC-ENTMCNC: 34.1 GM/DL — SIGNIFICANT CHANGE UP (ref 32–36)
MCHC RBC-ENTMCNC: 35 PG — HIGH (ref 27–34)
MCV RBC AUTO: 102.5 FL — HIGH (ref 80–100)
NITRITE UR-MCNC: NEGATIVE — SIGNIFICANT CHANGE UP
PH UR: 5 — SIGNIFICANT CHANGE UP (ref 5–8)
PLATELET # BLD AUTO: 423 K/UL — HIGH (ref 150–400)
POTASSIUM SERPL-MCNC: 3.9 MMOL/L — SIGNIFICANT CHANGE UP (ref 3.5–5.3)
POTASSIUM SERPL-SCNC: 3.9 MMOL/L — SIGNIFICANT CHANGE UP (ref 3.5–5.3)
PROT SERPL-MCNC: 8.1 G/DL — SIGNIFICANT CHANGE UP (ref 6–8.3)
PROT UR-MCNC: 15
RBC # BLD: 3.66 M/UL — LOW (ref 4.2–5.8)
RBC # FLD: 15.1 % — HIGH (ref 10.3–14.5)
RBC CASTS # UR COMP ASSIST: SIGNIFICANT CHANGE UP /HPF (ref 0–4)
SODIUM SERPL-SCNC: 137 MMOL/L — SIGNIFICANT CHANGE UP (ref 135–145)
SP GR SPEC: 1.02 — SIGNIFICANT CHANGE UP (ref 1.01–1.02)
UROBILINOGEN FLD QL: 1
WBC # BLD: 9.41 K/UL — SIGNIFICANT CHANGE UP (ref 3.8–10.5)
WBC # FLD AUTO: 9.41 K/UL — SIGNIFICANT CHANGE UP (ref 3.8–10.5)
WBC UR QL: SIGNIFICANT CHANGE UP

## 2022-08-27 PROCEDURE — 99285 EMERGENCY DEPT VISIT HI MDM: CPT

## 2022-08-27 PROCEDURE — 71045 X-RAY EXAM CHEST 1 VIEW: CPT | Mod: 26

## 2022-08-27 RX ORDER — MORPHINE SULFATE 50 MG/1
4 CAPSULE, EXTENDED RELEASE ORAL ONCE
Refills: 0 | Status: DISCONTINUED | OUTPATIENT
Start: 2022-08-27 | End: 2022-08-27

## 2022-08-27 RX ORDER — SODIUM CHLORIDE 9 MG/ML
1000 INJECTION INTRAMUSCULAR; INTRAVENOUS; SUBCUTANEOUS ONCE
Refills: 0 | Status: COMPLETED | OUTPATIENT
Start: 2022-08-27 | End: 2022-08-27

## 2022-08-27 RX ORDER — ONDANSETRON 8 MG/1
4 TABLET, FILM COATED ORAL ONCE
Refills: 0 | Status: COMPLETED | OUTPATIENT
Start: 2022-08-27 | End: 2022-08-27

## 2022-08-27 RX ADMIN — SODIUM CHLORIDE 1000 MILLILITER(S): 9 INJECTION INTRAMUSCULAR; INTRAVENOUS; SUBCUTANEOUS at 23:14

## 2022-08-27 RX ADMIN — MORPHINE SULFATE 4 MILLIGRAM(S): 50 CAPSULE, EXTENDED RELEASE ORAL at 23:14

## 2022-08-27 RX ADMIN — ONDANSETRON 4 MILLIGRAM(S): 8 TABLET, FILM COATED ORAL at 23:14

## 2022-08-27 NOTE — ED PROVIDER NOTE - GENITOURINARY BLADDER
Patient calls with c/o pain and swelling on left side of face. Has history of Bell's Palsy in 6/2016. 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 Denies any facial droop or slurred speech. Patient states she is at work and unable to make an appointment with Abiodun and no appointments available. Patient  requesting Valtrex and prednisone. Writer informed her she needs to be seen to rule out anything else. Patient agrees and appointment made for walk in clinic at 12:07.   
non-tender

## 2022-08-27 NOTE — ED PROVIDER NOTE - NSICDXPASTSURGICALHX_GEN_ALL_CORE_FT
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

## 2022-08-27 NOTE — ED ADULT TRIAGE NOTE - CHIEF COMPLAINT QUOTE
patient came in ED from home with c/o lower abdominal pain X yesterday. patient added the pain is felt across the lower abdomen but worst on the RLQ.

## 2022-08-27 NOTE — ED PROVIDER NOTE - SIGNIFICANT NEGATIVE FINDINGS
no headache, no chest pain, no SOB, no palpitations, no fever, no chills,  no urinary symptoms, no GI bleeding. no neuro changes.

## 2022-08-27 NOTE — ED PROVIDER NOTE - CLINICAL SUMMARY MEDICAL DECISION MAKING FREE TEXT BOX
abdominal pain x 48 hours, localizing to the RLQ, CT reporting appendicitis, surgical consult and admission

## 2022-08-27 NOTE — ED PROVIDER NOTE - NSICDXPASTMEDICALHX_GEN_ALL_CORE_FT
PAST MEDICAL HISTORY:  Arthritis generalized    Back pain     Degenerative disc disease, lumbar and cervical    Depression     Gout - Right big toe, last attack in 1973    Hiatal hernia     HTN (hypertension)     Hypercholesteremia     Hypothyroid     Mild intermittent asthma without complication last inhaler use in December, 2018    Reflux esophagitis     Ulcerative colitis

## 2022-08-28 ENCOUNTER — RESULT REVIEW (OUTPATIENT)
Age: 82
End: 2022-08-28

## 2022-08-28 DIAGNOSIS — F41.9 ANXIETY DISORDER, UNSPECIFIED: ICD-10-CM

## 2022-08-28 DIAGNOSIS — F32.9 MAJOR DEPRESSIVE DISORDER, SINGLE EPISODE, UNSPECIFIED: ICD-10-CM

## 2022-08-28 DIAGNOSIS — E78.00 PURE HYPERCHOLESTEROLEMIA, UNSPECIFIED: ICD-10-CM

## 2022-08-28 DIAGNOSIS — K21.0 GASTRO-ESOPHAGEAL REFLUX DISEASE WITH ESOPHAGITIS: ICD-10-CM

## 2022-08-28 DIAGNOSIS — K51.90 ULCERATIVE COLITIS, UNSPECIFIED, WITHOUT COMPLICATIONS: ICD-10-CM

## 2022-08-28 DIAGNOSIS — K35.80 UNSPECIFIED ACUTE APPENDICITIS: ICD-10-CM

## 2022-08-28 DIAGNOSIS — I10 ESSENTIAL (PRIMARY) HYPERTENSION: ICD-10-CM

## 2022-08-28 DIAGNOSIS — J45.20 MILD INTERMITTENT ASTHMA, UNCOMPLICATED: ICD-10-CM

## 2022-08-28 DIAGNOSIS — Z29.9 ENCOUNTER FOR PROPHYLACTIC MEASURES, UNSPECIFIED: ICD-10-CM

## 2022-08-28 DIAGNOSIS — E03.9 HYPOTHYROIDISM, UNSPECIFIED: ICD-10-CM

## 2022-08-28 DIAGNOSIS — Z96.642 PRESENCE OF LEFT ARTIFICIAL HIP JOINT: Chronic | ICD-10-CM

## 2022-08-28 PROBLEM — M51.36 OTHER INTERVERTEBRAL DISC DEGENERATION, LUMBAR REGION: Chronic | Status: ACTIVE | Noted: 2019-03-07

## 2022-08-28 LAB
ANISOCYTOSIS BLD QL: SLIGHT — SIGNIFICANT CHANGE UP
APTT BLD: 31.1 SEC — SIGNIFICANT CHANGE UP (ref 27.5–35.5)
BASOPHILS # BLD AUTO: 0 K/UL — SIGNIFICANT CHANGE UP (ref 0–0.2)
BASOPHILS NFR BLD AUTO: 0 % — SIGNIFICANT CHANGE UP (ref 0–2)
BLD GP AB SCN SERPL QL: SIGNIFICANT CHANGE UP
EOSINOPHIL # BLD AUTO: 0 K/UL — SIGNIFICANT CHANGE UP (ref 0–0.5)
EOSINOPHIL NFR BLD AUTO: 0 % — SIGNIFICANT CHANGE UP (ref 0–6)
INR BLD: 1.14 RATIO — SIGNIFICANT CHANGE UP (ref 0.88–1.16)
LYMPHOCYTES # BLD AUTO: 0.19 K/UL — LOW (ref 1–3.3)
LYMPHOCYTES # BLD AUTO: 2 % — LOW (ref 13–44)
MACROCYTES BLD QL: SLIGHT — SIGNIFICANT CHANGE UP
MANUAL SMEAR VERIFICATION: SIGNIFICANT CHANGE UP
MONOCYTES # BLD AUTO: 0.19 K/UL — SIGNIFICANT CHANGE UP (ref 0–0.9)
MONOCYTES NFR BLD AUTO: 2 % — SIGNIFICANT CHANGE UP (ref 2–14)
NEUTROPHILS # BLD AUTO: 9.03 K/UL — HIGH (ref 1.8–7.4)
NEUTROPHILS NFR BLD AUTO: 93 % — HIGH (ref 43–77)
NEUTS BAND # BLD: 3 % — SIGNIFICANT CHANGE UP (ref 0–8)
NRBC # BLD: 0 — SIGNIFICANT CHANGE UP
NRBC # BLD: SIGNIFICANT CHANGE UP /100 WBCS (ref 0–0)
PLAT MORPH BLD: NORMAL — SIGNIFICANT CHANGE UP
PROTHROM AB SERPL-ACNC: 13.3 SEC — SIGNIFICANT CHANGE UP (ref 10.5–13.4)
RBC BLD AUTO: SIGNIFICANT CHANGE UP
SARS-COV-2 RNA SPEC QL NAA+PROBE: SIGNIFICANT CHANGE UP

## 2022-08-28 PROCEDURE — 44970 LAPAROSCOPY APPENDECTOMY: CPT | Mod: 80

## 2022-08-28 PROCEDURE — 88304 TISSUE EXAM BY PATHOLOGIST: CPT | Mod: 26

## 2022-08-28 PROCEDURE — 99223 1ST HOSP IP/OBS HIGH 75: CPT | Mod: 57

## 2022-08-28 PROCEDURE — 93010 ELECTROCARDIOGRAM REPORT: CPT

## 2022-08-28 PROCEDURE — 44970 LAPAROSCOPY APPENDECTOMY: CPT

## 2022-08-28 PROCEDURE — 74177 CT ABD & PELVIS W/CONTRAST: CPT | Mod: 26,MA

## 2022-08-28 PROCEDURE — 99223 1ST HOSP IP/OBS HIGH 75: CPT | Mod: GC

## 2022-08-28 PROCEDURE — 70450 CT HEAD/BRAIN W/O DYE: CPT | Mod: 26

## 2022-08-28 DEVICE — STAPLER COVIDIEN TRI-STAPLE 45MM TAN RELOAD: Type: IMPLANTABLE DEVICE | Status: FUNCTIONAL

## 2022-08-28 DEVICE — ARISTA 3GR: Type: IMPLANTABLE DEVICE | Status: FUNCTIONAL

## 2022-08-28 DEVICE — STAPLER COVIDIEN TRI-STAPLE 60MM TAN RELOAD: Type: IMPLANTABLE DEVICE | Status: FUNCTIONAL

## 2022-08-28 RX ORDER — FENOFIBRATE,MICRONIZED 130 MG
145 CAPSULE ORAL DAILY
Refills: 0 | Status: DISCONTINUED | OUTPATIENT
Start: 2022-08-28 | End: 2022-08-31

## 2022-08-28 RX ORDER — LOSARTAN/HYDROCHLOROTHIAZIDE 100MG-25MG
1 TABLET ORAL
Qty: 0 | Refills: 0 | DISCHARGE

## 2022-08-28 RX ORDER — MESALAMINE 400 MG
1000 TABLET, DELAYED RELEASE (ENTERIC COATED) ORAL AT BEDTIME
Refills: 0 | Status: DISCONTINUED | OUTPATIENT
Start: 2022-08-28 | End: 2022-08-31

## 2022-08-28 RX ORDER — ASCORBIC ACID 60 MG
1 TABLET,CHEWABLE ORAL
Qty: 0 | Refills: 0 | DISCHARGE

## 2022-08-28 RX ORDER — ALBUTEROL 90 UG/1
0 AEROSOL, METERED ORAL
Qty: 0 | Refills: 0 | DISCHARGE

## 2022-08-28 RX ORDER — ONDANSETRON 8 MG/1
4 TABLET, FILM COATED ORAL EVERY 6 HOURS
Refills: 0 | Status: DISCONTINUED | OUTPATIENT
Start: 2022-08-28 | End: 2022-08-28

## 2022-08-28 RX ORDER — ACETAMINOPHEN 500 MG
1000 TABLET ORAL EVERY 6 HOURS
Refills: 0 | Status: DISCONTINUED | OUTPATIENT
Start: 2022-08-28 | End: 2022-08-30

## 2022-08-28 RX ORDER — LOSARTAN POTASSIUM 100 MG/1
1 TABLET, FILM COATED ORAL
Qty: 0 | Refills: 0 | DISCHARGE

## 2022-08-28 RX ORDER — LEVOTHYROXINE SODIUM 125 MCG
1 TABLET ORAL
Qty: 0 | Refills: 0 | DISCHARGE

## 2022-08-28 RX ORDER — HYDROMORPHONE HYDROCHLORIDE 2 MG/ML
1 INJECTION INTRAMUSCULAR; INTRAVENOUS; SUBCUTANEOUS
Refills: 0 | Status: DISCONTINUED | OUTPATIENT
Start: 2022-08-28 | End: 2022-08-28

## 2022-08-28 RX ORDER — FENOFIBRATE,MICRONIZED 130 MG
1 CAPSULE ORAL
Qty: 0 | Refills: 0 | DISCHARGE

## 2022-08-28 RX ORDER — MERCAPTOPURINE 50 MG/1
75 TABLET ORAL
Qty: 0 | Refills: 0 | DISCHARGE

## 2022-08-28 RX ORDER — KETOROLAC TROMETHAMINE 30 MG/ML
15 SYRINGE (ML) INJECTION EVERY 6 HOURS
Refills: 0 | Status: DISCONTINUED | OUTPATIENT
Start: 2022-08-28 | End: 2022-08-28

## 2022-08-28 RX ORDER — TRAZODONE HCL 50 MG
100 TABLET ORAL AT BEDTIME
Refills: 0 | Status: DISCONTINUED | OUTPATIENT
Start: 2022-08-28 | End: 2022-08-28

## 2022-08-28 RX ORDER — PANTOPRAZOLE SODIUM 20 MG/1
40 TABLET, DELAYED RELEASE ORAL DAILY
Refills: 0 | Status: DISCONTINUED | OUTPATIENT
Start: 2022-08-28 | End: 2022-08-28

## 2022-08-28 RX ORDER — OXYCODONE HYDROCHLORIDE 5 MG/1
5 TABLET ORAL EVERY 4 HOURS
Refills: 0 | Status: DISCONTINUED | OUTPATIENT
Start: 2022-08-28 | End: 2022-08-31

## 2022-08-28 RX ORDER — HYDROMORPHONE HYDROCHLORIDE 2 MG/ML
0.5 INJECTION INTRAMUSCULAR; INTRAVENOUS; SUBCUTANEOUS EVERY 4 HOURS
Refills: 0 | Status: DISCONTINUED | OUTPATIENT
Start: 2022-08-28 | End: 2022-08-28

## 2022-08-28 RX ORDER — DIPHENHYDRAMINE HCL 50 MG
25 CAPSULE ORAL EVERY 4 HOURS
Refills: 0 | Status: DISCONTINUED | OUTPATIENT
Start: 2022-08-28 | End: 2022-08-30

## 2022-08-28 RX ORDER — MERCAPTOPURINE 50 MG/1
2 TABLET ORAL
Qty: 0 | Refills: 0 | DISCHARGE

## 2022-08-28 RX ORDER — TEMAZEPAM 15 MG/1
1 CAPSULE ORAL
Qty: 0 | Refills: 0 | DISCHARGE

## 2022-08-28 RX ORDER — SODIUM CHLORIDE 9 MG/ML
1000 INJECTION, SOLUTION INTRAVENOUS
Refills: 0 | Status: DISCONTINUED | OUTPATIENT
Start: 2022-08-28 | End: 2022-08-28

## 2022-08-28 RX ORDER — ACETAMINOPHEN 500 MG
1000 TABLET ORAL ONCE
Refills: 0 | Status: COMPLETED | OUTPATIENT
Start: 2022-08-28 | End: 2022-08-28

## 2022-08-28 RX ORDER — OMEPRAZOLE 10 MG/1
1 CAPSULE, DELAYED RELEASE ORAL
Qty: 0 | Refills: 0 | DISCHARGE

## 2022-08-28 RX ORDER — TRAZODONE HCL 50 MG
1 TABLET ORAL
Qty: 0 | Refills: 0 | DISCHARGE

## 2022-08-28 RX ORDER — ALBUTEROL 90 UG/1
2 AEROSOL, METERED ORAL EVERY 6 HOURS
Refills: 0 | Status: DISCONTINUED | OUTPATIENT
Start: 2022-08-28 | End: 2022-08-31

## 2022-08-28 RX ORDER — LEVOTHYROXINE SODIUM 125 MCG
212.5 TABLET ORAL DAILY
Refills: 0 | Status: DISCONTINUED | OUTPATIENT
Start: 2022-08-28 | End: 2022-08-31

## 2022-08-28 RX ORDER — ASPIRIN/CALCIUM CARB/MAGNESIUM 324 MG
1 TABLET ORAL
Qty: 0 | Refills: 0 | DISCHARGE

## 2022-08-28 RX ORDER — HYDROCHLOROTHIAZIDE 25 MG
25 TABLET ORAL DAILY
Refills: 0 | Status: DISCONTINUED | OUTPATIENT
Start: 2022-08-28 | End: 2022-08-31

## 2022-08-28 RX ORDER — SENNA PLUS 8.6 MG/1
2 TABLET ORAL AT BEDTIME
Refills: 0 | Status: DISCONTINUED | OUTPATIENT
Start: 2022-08-28 | End: 2022-08-31

## 2022-08-28 RX ORDER — ENOXAPARIN SODIUM 100 MG/ML
40 INJECTION SUBCUTANEOUS EVERY 24 HOURS
Refills: 0 | Status: DISCONTINUED | OUTPATIENT
Start: 2022-08-29 | End: 2022-08-31

## 2022-08-28 RX ORDER — CEFOTETAN DISODIUM 1 G
2 VIAL (EA) INJECTION EVERY 12 HOURS
Refills: 0 | Status: DISCONTINUED | OUTPATIENT
Start: 2022-08-28 | End: 2022-08-28

## 2022-08-28 RX ORDER — CHOLECALCIFEROL (VITAMIN D3) 125 MCG
0 CAPSULE ORAL
Qty: 0 | Refills: 0 | DISCHARGE

## 2022-08-28 RX ORDER — BUPROPION HYDROCHLORIDE 150 MG/1
1 TABLET, EXTENDED RELEASE ORAL
Qty: 0 | Refills: 0 | DISCHARGE

## 2022-08-28 RX ORDER — OXYCODONE AND ACETAMINOPHEN 5; 325 MG/1; MG/1
1 TABLET ORAL ONCE
Refills: 0 | Status: DISCONTINUED | OUTPATIENT
Start: 2022-08-28 | End: 2022-08-28

## 2022-08-28 RX ORDER — TEMAZEPAM 15 MG/1
15 CAPSULE ORAL AT BEDTIME
Refills: 0 | Status: DISCONTINUED | OUTPATIENT
Start: 2022-08-28 | End: 2022-08-31

## 2022-08-28 RX ORDER — CEFOTETAN DISODIUM 1 G
2 VIAL (EA) INJECTION EVERY 12 HOURS
Refills: 0 | Status: DISCONTINUED | OUTPATIENT
Start: 2022-08-28 | End: 2022-08-29

## 2022-08-28 RX ORDER — TRAZODONE HCL 50 MG
100 TABLET ORAL AT BEDTIME
Refills: 0 | Status: DISCONTINUED | OUTPATIENT
Start: 2022-08-28 | End: 2022-08-31

## 2022-08-28 RX ORDER — SODIUM CHLORIDE 9 MG/ML
1000 INJECTION, SOLUTION INTRAVENOUS
Refills: 0 | Status: DISCONTINUED | OUTPATIENT
Start: 2022-08-28 | End: 2022-08-31

## 2022-08-28 RX ORDER — ONDANSETRON 8 MG/1
4 TABLET, FILM COATED ORAL ONCE
Refills: 0 | Status: DISCONTINUED | OUTPATIENT
Start: 2022-08-28 | End: 2022-08-28

## 2022-08-28 RX ORDER — TEMAZEPAM 15 MG/1
100 CAPSULE ORAL
Qty: 0 | Refills: 0 | DISCHARGE

## 2022-08-28 RX ORDER — HYDROMORPHONE HYDROCHLORIDE 2 MG/ML
1 INJECTION INTRAMUSCULAR; INTRAVENOUS; SUBCUTANEOUS EVERY 4 HOURS
Refills: 0 | Status: DISCONTINUED | OUTPATIENT
Start: 2022-08-28 | End: 2022-08-28

## 2022-08-28 RX ORDER — AMLODIPINE BESYLATE 2.5 MG/1
10 TABLET ORAL DAILY
Refills: 0 | Status: DISCONTINUED | OUTPATIENT
Start: 2022-08-28 | End: 2022-08-31

## 2022-08-28 RX ORDER — HYDROMORPHONE HYDROCHLORIDE 2 MG/ML
0.5 INJECTION INTRAMUSCULAR; INTRAVENOUS; SUBCUTANEOUS
Refills: 0 | Status: DISCONTINUED | OUTPATIENT
Start: 2022-08-28 | End: 2022-08-28

## 2022-08-28 RX ORDER — LOSARTAN POTASSIUM 100 MG/1
100 TABLET, FILM COATED ORAL DAILY
Refills: 0 | Status: DISCONTINUED | OUTPATIENT
Start: 2022-08-28 | End: 2022-08-31

## 2022-08-28 RX ORDER — HYDROMORPHONE HYDROCHLORIDE 2 MG/ML
0.5 INJECTION INTRAMUSCULAR; INTRAVENOUS; SUBCUTANEOUS EVERY 4 HOURS
Refills: 0 | Status: DISCONTINUED | OUTPATIENT
Start: 2022-08-28 | End: 2022-08-30

## 2022-08-28 RX ORDER — PANTOPRAZOLE SODIUM 20 MG/1
40 TABLET, DELAYED RELEASE ORAL
Refills: 0 | Status: DISCONTINUED | OUTPATIENT
Start: 2022-08-28 | End: 2022-08-31

## 2022-08-28 RX ORDER — MESALAMINE 400 MG
1 TABLET, DELAYED RELEASE (ENTERIC COATED) ORAL
Qty: 0 | Refills: 0 | DISCHARGE

## 2022-08-28 RX ORDER — ONDANSETRON 8 MG/1
4 TABLET, FILM COATED ORAL EVERY 6 HOURS
Refills: 0 | Status: DISCONTINUED | OUTPATIENT
Start: 2022-08-28 | End: 2022-08-31

## 2022-08-28 RX ORDER — ALBUTEROL 90 UG/1
2 AEROSOL, METERED ORAL
Qty: 0 | Refills: 0 | DISCHARGE

## 2022-08-28 RX ORDER — LEVOTHYROXINE SODIUM 125 MCG
0.5 TABLET ORAL
Qty: 0 | Refills: 0 | DISCHARGE

## 2022-08-28 RX ORDER — AMLODIPINE BESYLATE 2.5 MG/1
1 TABLET ORAL
Qty: 0 | Refills: 0 | DISCHARGE

## 2022-08-28 RX ORDER — METOCLOPRAMIDE HCL 10 MG
10 TABLET ORAL ONCE
Refills: 0 | Status: DISCONTINUED | OUTPATIENT
Start: 2022-08-28 | End: 2022-08-28

## 2022-08-28 RX ADMIN — SODIUM CHLORIDE 100 MILLILITER(S): 9 INJECTION, SOLUTION INTRAVENOUS at 21:36

## 2022-08-28 RX ADMIN — Medication 25 MILLIGRAM(S): at 16:34

## 2022-08-28 RX ADMIN — TEMAZEPAM 15 MILLIGRAM(S): 15 CAPSULE ORAL at 23:51

## 2022-08-28 RX ADMIN — Medication 1000 MILLIGRAM(S): at 18:54

## 2022-08-28 RX ADMIN — OXYCODONE HYDROCHLORIDE 5 MILLIGRAM(S): 5 TABLET ORAL at 20:36

## 2022-08-28 RX ADMIN — Medication 100 GRAM(S): at 05:22

## 2022-08-28 RX ADMIN — SENNA PLUS 2 TABLET(S): 8.6 TABLET ORAL at 21:36

## 2022-08-28 RX ADMIN — Medication 100 GRAM(S): at 18:18

## 2022-08-28 RX ADMIN — OXYCODONE HYDROCHLORIDE 5 MILLIGRAM(S): 5 TABLET ORAL at 20:06

## 2022-08-28 RX ADMIN — Medication 1000 MILLIGRAM(S): at 23:48

## 2022-08-28 RX ADMIN — SODIUM CHLORIDE 75 MILLILITER(S): 9 INJECTION, SOLUTION INTRAVENOUS at 13:11

## 2022-08-28 RX ADMIN — Medication 1000 MILLIGRAM(S): at 17:42

## 2022-08-28 RX ADMIN — HYDROMORPHONE HYDROCHLORIDE 1 MILLIGRAM(S): 2 INJECTION INTRAMUSCULAR; INTRAVENOUS; SUBCUTANEOUS at 03:09

## 2022-08-28 RX ADMIN — Medication 400 MILLIGRAM(S): at 13:35

## 2022-08-28 RX ADMIN — Medication 100 MILLIGRAM(S): at 23:01

## 2022-08-28 RX ADMIN — Medication 1000 MILLIGRAM(S): at 13:50

## 2022-08-28 RX ADMIN — Medication 1 DROP(S): at 16:34

## 2022-08-28 RX ADMIN — Medication 1000 MILLIGRAM(S): at 21:37

## 2022-08-28 NOTE — CONSULT NOTE ADULT - SUBJECTIVE AND OBJECTIVE BOX
GRETA HAY  82y  Male    81yo M with PMH UC, HTN, hypothyroid, HLD, asthma, chronic back pain, anxiety, GERD, hiatal hernia, generalized arthritis, s/p bilateral total hip replacement, s/p multiple ventral hernia repairs (1990, Dr. Goldberg, unsure if mesh was used), s/p bilateral knee arthroscopies, s/p L5-S1 laminectomy/fusion (2017), presents with 2 day history of worsening 7/10 abdominal pain which began in the central lower abdomen and migrating to the RLQ. Patient states that pain began on Thursday night (8/25) with tremendous abdominal pain and "thought my stomach was going to explode." Patient drank Britany[place] believing it to be gas pain which helped slightly; however, he struggled to sleep as he could not find a comfortable position. Pain slightly improved on 8/26 but patient still wasn't feeling well; he reported being able to successfully pass a BM. On 8/27, he was cleaning his pool and had dinner but pain increased significantly when he went to lie down.     PAST MEDICAL/SURGICAL HISTORY:  PAST MEDICAL & SURGICAL HISTORY:  HTN (hypertension)    Hypercholesteremia    Reflux esophagitis    Hypothyroid    Arthritis, generalized    Back pain    Hiatal hernia    Gout  - Right big toe, last attack in 1973    Degenerative disc disease, lumbar and cervical    Mild intermittent asthma without complication  last inhaler use in December, 2018    Depression    Ulcerative colitis    Elective surgery  laminectomy with fusion L5-S1,  2017    Elective surgery  left shoulder 2000    Elective surgery  hernia repair  - umbilical,  1990    Elective surgery  bilat knee arthroscopy, 1990s    Elective surgery  bilat cataract, 2016    H/O hernia repair  ventral, open, 2004    History of total hip replacement, 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    History of total hip replacement, left  3/2019    REVIEW OF SYSTEMS:  CONSTITUTIONAL: denies fever, chills, fatigue, weakness  HEENT: denies blurred vision, sore throat  SKIN: denies new lesions, rash  CARDIOVASCULAR: denies chest pain, chest pressure, palpitations  RESPIRATORY: denies shortness of breath, sputum production  GASTROINTESTINAL: denies nausea, vomiting, diarrhea, abdominal pain  GENITOURINARY: denies dysuria, discharge  NEUROLOGICAL: denies numbness, headache, focal weakness  MUSCULOSKELETAL: denies new joint pain, muscle aches  HEMATOLOGIC: denies gross bleeding, bruising  LYMPHATICS: denies enlarged lymph nodes, extremity swelling  PSYCHIATRIC: denies recent changes in anxiety, depression  ENDOCRINOLOGIC: denies sweating, cold or heat intolerance    PHYSICAL EXAM:  T(C): 36.7 (08-27-22 @ 21:53), Max: 36.7 (08-27-22 @ 21:53)  HR: 67 (08-27-22 @ 21:53) (67 - 67)  BP: 133/74 (08-27-22 @ 21:53) (133/74 - 133/74)  RR: 18 (08-27-22 @ 21:53) (18 - 18)  SpO2: 97% (08-27-22 @ 21:53) (97% - 97%)    Parameters below as of 27 Aug 2022 21:53  Patient On (Oxygen Delivery Method): room air    GENERAL: patient appears well, no acute distress, conversant  EYES: anicteric sclerae, no exudates  ENMT: oropharynx clear without erythema, no exudates, moist mucous membranes  LUNGS: clear to auscultation bilaterally, no rales or wheezing  HEART: S1/S2, regular rate and rhythm, no murmurs noted, no lower extremity edema  GASTROINTESTINAL: abdomen is soft, nontender, nondistended, normoactive bowel sounds, no palpable masses  INTEGUMENT: good skin turgor, warm skin, appears well perfused  MUSCULOSKELETAL: no clubbing or cyanosis, no obvious deformity  NEUROLOGIC: awake, alert, oriented x3, good muscle tone in 4 extremities, no obvious sensory deficits  PSYCHIATRIC: mood is good, affect is congruent, linear and logical thought process  HEME/LYMPH: no obvious ecchymosis or petechiae       Consultant(s) Notes Reviewed: [X] YES [ ] NO  Care discussed with Consultants/Other Providers: [X] YES  [ ] NO    LABS:  CBC Full  -  ( 27 Aug 2022 22:30 )  WBC Count : 9.41 K/uL  RBC Count : 3.66 M/uL  Hemoglobin : 12.8 g/dL  Hematocrit : 37.5 %  Platelet Count - Automated : 423 K/uL  Mean Cell Volume : 102.5 fl  Mean Cell Hemoglobin : 35.0 pg  Mean Cell Hemoglobin Concentration : 34.1 gm/dL  Auto Neutrophil # : 9.03 K/uL  Auto Lymphocyte # : 0.19 K/uL  Auto Monocyte # : 0.19 K/uL  Auto Eosinophil # : 0.00 K/uL  Auto Basophil # : 0.00 K/uL  Auto Neutrophil % : 93.0 %  Auto Lymphocyte % : 2.0 %  Auto Monocyte % : 2.0 %  Auto Eosinophil % : 0.0 %  Auto Basophil % : 0.0 %    [facility]  08-27    137  |  101  |  21  ----------------------------<  101<H>  3.9   |  31  |  1.00    Ca    9.2      27 Aug 2022 22:30    TPro  8.1  /  Alb  4.2  /  TBili  1.1  /  DBili  x   /  AST  14<L>  /  ALT  17  /  AlkPhos  63  08-27      PT/INR - ( 28 Aug 2022 03:15 )   PT: 13.3 sec;   INR: 1.14 ratio      RADIOLOGY & ADDITIONAL TESTS:  < from: CT Abdomen and Pelvis w/ IV Cont (08.28.22 @ 00:53) >  NTERPRETATION:  CLINICAL INFORMATION: Right lower quadrant abdominal   pain.    COMPARISON: None.    CONTRAST/COMPLICATIONS:  IV Contrast: Omnipaque 350  90 cc administered   10 cc discarded  Oral Contrast: NONE  Complications: None reported at time of study completion    PROCEDURE:  CT of the Abdomen and Pelvis was performed.  Sagittal and coronal reformats were performed.    FINDINGS:  LOWER CHEST: Coronary artery calcification.    LIVER: Within normal limits.  BILE DUCTS: Normal caliber.  GALLBLADDER: Within normal limits.  SPLEEN: Within normal limits.  PANCREAS: Within normal limits.  ADRENALS: Within normal limits.  KIDNEYS/URETERS: Low-attenuation lesion within left kidney too small to   accurately characterize. No hydronephrosis. Nonobstructing left lower   pole renal calculus.    BLADDER: Within normal limits.  REPRODUCTIVE ORGANS: Obscured by streak artifact.    BOWEL: No bowel obstruction. Extensive colonic diverticulosis without CT   evidence of acute diverticulitis. Appendix is thickened and hyperemic   with associated periappendiceal inflammation and small fluid. No discrete   extraluminal gas or drainable fluidcollection.  PERITONEUM: No ascites.  VESSELS: Atherosclerotic changes.  RETROPERITONEUM/LYMPH NODES: No lymphadenopathy.  ABDOMINAL WALL: Within normal limits.  BONES: Status post bilateral hip arthroplasties. L5-S1 posterior lumbar   fusion.    IMPRESSION:  Acute appendicitis. No extraluminal gas or drainable fluid collection.    < end of copied text >    Imaging Personally Reviewed: [X] YES [ ] NO       PTT - ( 28 Aug 2022 03:15 )  PTT:31.1 sec GRETA HAY  82y  Male    81yo M with PMH UC, HTN, hypothyroid, HLD, asthma, chronic back pain, anxiety, GERD, hiatal hernia, generalized arthritis, s/p bilateral total hip replacement, s/p multiple ventral hernia repairs (1990, Dr. Goldberg, unsure if mesh was used), s/p bilateral knee arthroscopies, s/p L5-S1 laminectomy/fusion (2017), presents with 2 day history of worsening 7/10 abdominal pain which began in the central lower abdomen and migrating to the RLQ. Patient states that pain began on Thursday night (8/25) with tremendous abdominal pain and "thought my stomach was going to explode." Patient drank Britany-[place] believing it to be gas pain which helped slightly; however, he struggled to sleep as he could not find a comfortable position. Pain slightly improved on 8/26 but patient still wasn't feeling well; he reported being able to successfully pass a BM. On 8/27, he was cleaning his pool and had dinner but pain increased significantly when he went to lie down. Currently, pain is rated to be a 5/10 but patient states that he received Morphine and Dilaudid which has provided adequate pain control.     in ED:  VS: /74, HR 67, RR 18, SpO2 97% on RA  Labs significant for: RBC 3.66, .5  CXR: On wet read, when compared to prior imaging on 1/16/2018 shows increased haziness on medial aspect of R lung  CT abd/pelvis: Acute appendicitis. No extraluminal gas or drainable fluid collection.  UA: Clear with proteinuria, small blood  EKG: NSR at 79 bpm  Given: Morphine 4mg, Zofran, Dilaudid 1mg    PAST MEDICAL & SURGICAL HISTORY:  HTN (hypertension)    Hypercholesteremia    Reflux esophagitis    Hypothyroid    Arthritis, generalized    Back pain    Hiatal hernia    Gout  - Right big toe, last attack in 1973    Degenerative disc disease, lumbar and cervical    Mild intermittent asthma without complication  last inhaler use in December, 2018    Depression    Ulcerative colitis    Elective surgery  laminectomy with fusion L5-S1,  2017    Elective surgery  left shoulder 2000    Elective surgery  hernia repair  - umbilical,  1990    Elective surgery  bilat knee arthroscopy, 1990s    Elective surgery  bilat cataract, 2016    H/O hernia repair  ventral, open, 2004    History of total hip replacement, 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 denies chills, fatigue, weakness  HEENT: denies blurred vision, sore throat  SKIN: denies new lesions, rash  CARDIOVASCULAR: denies chest pain, chest pressure, palpitations  RESPIRATORY: denies shortness of breath, sputum production  GASTROINTESTINAL: +abdominal pain. denies nausea, vomiting, diarrhea  GENITOURINARY: denies dysuria, discharge  NEUROLOGICAL: +numbness of B/L hands and wrists 2/2 arthritis. no headache, focal weakness  MUSCULOSKELETAL: denies new joint pain, muscle aches  HEMATOLOGIC: denies gross bleeding, bruising  LYMPHATICS: denies enlarged lymph nodes, extremity swelling  PSYCHIATRIC: denies recent changes in anxiety, depression  ENDOCRINOLOGIC: denies sweating, cold or heat intolerance    PHYSICAL EXAM:  T(C): 36.7 (08-27-22 @ 21:53), Max: 36.7 (08-27-22 @ 21:53)  HR: 67 (08-27-22 @ 21:53) (67 - 67)  BP: 133/74 (08-27-22 @ 21:53) (133/74 - 133/74)  RR: 18 (08-27-22 @ 21:53) (18 - 18)  SpO2: 97% (08-27-22 @ 21:53) (97% - 97%)    Parameters below as of 27 Aug 2022 21:53  Patient On (Oxygen Delivery Method): room air    GENERAL: patient appears well, no acute distress, conversant  EYES: anicteric sclerae, no exudates  ENMT: oropharynx clear without erythema, no exudates, moist mucous membranes  LUNGS: clear to auscultation bilaterally, no rales or wheezing  HEART: S1/S2, regular rate and rhythm, murmur auscultated on exam, no lower extremity edema  GASTROINTESTINAL: +abdomen soft but distended and tender to palpation, most noticeable in RLQ. +Rovsing sign, diminished bowel sounds, no palpable masses  INTEGUMENT: good skin turgor, warm skin, appears well perfused  MUSCULOSKELETAL: no clubbing or cyanosis, no obvious deformity  NEUROLOGIC: awake, alert, oriented x3, good muscle tone in 4 extremities, no obvious sensory deficits  PSYCHIATRIC: mood is good, affect is congruent, linear and logical thought process  HEME/LYMPH: no obvious ecchymosis or petechiae       Consultant(s) Notes Reviewed: [X] YES [ ] NO  Care discussed with Consultants/Other Providers: [X] YES  [ ] NO    LABS:  CBC Full  -  ( 27 Aug 2022 22:30 )  WBC Count : 9.41 K/uL  RBC Count : 3.66 M/uL  Hemoglobin : 12.8 g/dL  Hematocrit : 37.5 %  Platelet Count - Automated : 423 K/uL  Mean Cell Volume : 102.5 fl  Mean Cell Hemoglobin : 35.0 pg  Mean Cell Hemoglobin Concentration : 34.1 gm/dL  Auto Neutrophil # : 9.03 K/uL  Auto Lymphocyte # : 0.19 K/uL  Auto Monocyte # : 0.19 K/uL  Auto Eosinophil # : 0.00 K/uL  Auto Basophil # : 0.00 K/uL  Auto Neutrophil % : 93.0 %  Auto Lymphocyte % : 2.0 %  Auto Monocyte % : 2.0 %  Auto Eosinophil % : 0.0 %  Auto Basophil % : 0.0 %    08-27    137  |  101  |  21  ----------------------------<  101<H>  3.9   |  31  |  1.00    Ca    9.2      27 Aug 2022 22:30    TPro  8.1  /  Alb  4.2  /  TBili  1.1  /  DBili  x   /  AST  14<L>  /  ALT  17  /  AlkPhos  63  08-27      PT/INR - ( 28 Aug 2022 03:15 )   PT: 13.3 sec;   INR: 1.14 ratio      RADIOLOGY & ADDITIONAL TESTS:  < from: CT Abdomen and Pelvis w/ IV Cont (08.28.22 @ 00:53) >  INTERPRETATION:  CLINICAL INFORMATION: Right lower quadrant abdominal   pain.    COMPARISON: None.    CONTRAST/COMPLICATIONS:  IV Contrast: Omnipaque 350  90 cc administered   10 cc discarded  Oral Contrast: NONE  Complications: None reported at time of study completion    PROCEDURE:  CT of the Abdomen and Pelvis was performed.  Sagittal and coronal reformats were performed.    FINDINGS:  LOWER CHEST: Coronary artery calcification.    LIVER: Within normal limits.  BILE DUCTS: Normal caliber.  GALLBLADDER: Within normal limits.  SPLEEN: Within normal limits.  PANCREAS: Within normal limits.  ADRENALS: Within normal limits.  KIDNEYS/URETERS: Low-attenuation lesion within left kidney too small to   accurately characterize. No hydronephrosis. Nonobstructing left lower   pole renal calculus.    BLADDER: Within normal limits.  REPRODUCTIVE ORGANS: Obscured by streak artifact.    BOWEL: No bowel obstruction. Extensive colonic diverticulosis without CT   evidence of acute diverticulitis. Appendix is thickened and hyperemic   with associated periappendiceal inflammation and small fluid. No discrete   extraluminal gas or drainable fluidcollection.  PERITONEUM: No ascites.  VESSELS: Atherosclerotic changes.  RETROPERITONEUM/LYMPH NODES: No lymphadenopathy.  ABDOMINAL WALL: Within normal limits.  BONES: Status post bilateral hip arthroplasties. L5-S1 posterior lumbar   fusion.    IMPRESSION:  Acute appendicitis. No extraluminal gas or drainable fluid collection.    < end of copied text >    Imaging Personally Reviewed: [X] YES [ ] NO       PTT - ( 28 Aug 2022 03:15 )  PTT:31.1 sec GRETA HAY  82y  Male    81yo M with PMH UC, HTN, hypothyroid, HLD, asthma, chronic back pain, anxiety, GERD, hiatal hernia, generalized arthritis, s/p bilateral total hip replacement, s/p multiple ventral hernia repairs (1990, Dr. Goldberg, unsure if mesh was used), s/p bilateral knee arthroscopies, s/p L5-S1 laminectomy/fusion (2017), presents with 2 day history of worsening 7/10 abdominal pain which began in the central lower abdomen and migrating to the RLQ. Patient states that pain began on Thursday night (8/25) with tremendous abdominal pain and "thought my stomach was going to explode." Patient drank Britany-[place] believing it to be gas pain which helped slightly; however, he struggled to sleep as he could not find a comfortable position. Pain slightly improved on 8/26 but patient still wasn't feeling well; he reported being able to successfully pass a BM. On 8/27, he was cleaning his pool and had dinner but pain increased significantly when he went to lie down. Currently, pain is rated to be a 5/10 but patient states that he received Morphine and Dilaudid which has provided adequate pain control.     in ED:  VS: /74, HR 67, RR 18, SpO2 97% on RA  Labs significant for: RBC 3.66, .5  CXR: On wet read, when compared to prior imaging on 1/16/2018 shows increased haziness on medial aspect of R lung  CT abd/pelvis: Acute appendicitis. No extraluminal gas or drainable fluid collection.  UA: Clear with proteinuria, small blood  EKG: NSR at 79 bpm  Given: Morphine 4mg, Zofran, Dilaudid 1mg    PAST MEDICAL & SURGICAL HISTORY:  HTN (hypertension)    Hypercholesteremia    Reflux esophagitis    Hypothyroid    Arthritis, generalized    Back pain    Hiatal hernia    Gout  - Right big toe, last attack in 1973    Degenerative disc disease, lumbar and cervical    Mild intermittent asthma without complication  last inhaler use in December, 2018    Depression    Ulcerative colitis    Elective surgery  laminectomy with fusion L5-S1,  2017    Elective surgery  left shoulder 2000    Elective surgery  hernia repair  - umbilical,  1990    Elective surgery  bilat knee arthroscopy, 1990s    Elective surgery  bilat cataract, 2016    H/O hernia repair  ventral, open, 2004    History of total hip replacement, 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 denies chills, fatigue, weakness  HEENT: denies blurred vision, sore throat  SKIN: denies new lesions, rash  CARDIOVASCULAR: denies chest pain, chest pressure, palpitations  RESPIRATORY: denies shortness of breath, sputum production  GASTROINTESTINAL: +abdominal pain. denies nausea, vomiting, diarrhea  GENITOURINARY: denies dysuria, discharge  NEUROLOGICAL: +numbness of B/L hands and wrists 2/2 arthritis. no headache, focal weakness  MUSCULOSKELETAL: denies new joint pain, muscle aches  HEMATOLOGIC: denies gross bleeding, bruising  LYMPHATICS: denies enlarged lymph nodes, extremity swelling  PSYCHIATRIC: denies recent changes in anxiety, depression  ENDOCRINOLOGIC: denies sweating, cold or heat intolerance    PHYSICAL EXAM:  T(C): 36.7 (08-27-22 @ 21:53), Max: 36.7 (08-27-22 @ 21:53)  HR: 67 (08-27-22 @ 21:53) (67 - 67)  BP: 133/74 (08-27-22 @ 21:53) (133/74 - 133/74)  RR: 18 (08-27-22 @ 21:53) (18 - 18)  SpO2: 97% (08-27-22 @ 21:53) (97% - 97%)    Parameters below as of 27 Aug 2022 21:53  Patient On (Oxygen Delivery Method): room air    GENERAL: patient appears well, no acute distress, conversant  EYES: anicteric sclerae, no exudates  ENMT: oropharynx clear without erythema, no exudates, moist mucous membranes  LUNGS: clear to auscultation bilaterally, no rales or wheezing  HEART: S1/S2, regular rate and rhythm, systolic murmur auscultated on exam, no lower extremity edema  GASTROINTESTINAL: +abdomen soft but distended and tender to palpation, most noticeable in RLQ. +Rovsing sign, diminished bowel sounds, no palpable masses  INTEGUMENT: good skin turgor, warm skin, appears well perfused  MUSCULOSKELETAL: no clubbing or cyanosis, no obvious deformity  NEUROLOGIC: awake, alert, oriented x3, good muscle tone in 4 extremities, no obvious sensory deficits  PSYCHIATRIC: mood is good, affect is congruent, linear and logical thought process  HEME/LYMPH: no obvious ecchymosis or petechiae       Consultant(s) Notes Reviewed: [X] YES [ ] NO  Care discussed with Consultants/Other Providers: [X] YES  [ ] NO    LABS:  CBC Full  -  ( 27 Aug 2022 22:30 )  WBC Count : 9.41 K/uL  RBC Count : 3.66 M/uL  Hemoglobin : 12.8 g/dL  Hematocrit : 37.5 %  Platelet Count - Automated : 423 K/uL  Mean Cell Volume : 102.5 fl  Mean Cell Hemoglobin : 35.0 pg  Mean Cell Hemoglobin Concentration : 34.1 gm/dL  Auto Neutrophil # : 9.03 K/uL  Auto Lymphocyte # : 0.19 K/uL  Auto Monocyte # : 0.19 K/uL  Auto Eosinophil # : 0.00 K/uL  Auto Basophil # : 0.00 K/uL  Auto Neutrophil % : 93.0 %  Auto Lymphocyte % : 2.0 %  Auto Monocyte % : 2.0 %  Auto Eosinophil % : 0.0 %  Auto Basophil % : 0.0 %    08-27    137  |  101  |  21  ----------------------------<  101<H>  3.9   |  31  |  1.00    Ca    9.2      27 Aug 2022 22:30    TPro  8.1  /  Alb  4.2  /  TBili  1.1  /  DBili  x   /  AST  14<L>  /  ALT  17  /  AlkPhos  63  08-27      PT/INR - ( 28 Aug 2022 03:15 )   PT: 13.3 sec;   INR: 1.14 ratio      RADIOLOGY & ADDITIONAL TESTS:  < from: CT Abdomen and Pelvis w/ IV Cont (08.28.22 @ 00:53) >  INTERPRETATION:  CLINICAL INFORMATION: Right lower quadrant abdominal   pain.    COMPARISON: None.    CONTRAST/COMPLICATIONS:  IV Contrast: Omnipaque 350  90 cc administered   10 cc discarded  Oral Contrast: NONE  Complications: None reported at time of study completion    PROCEDURE:  CT of the Abdomen and Pelvis was performed.  Sagittal and coronal reformats were performed.    FINDINGS:  LOWER CHEST: Coronary artery calcification.    LIVER: Within normal limits.  BILE DUCTS: Normal caliber.  GALLBLADDER: Within normal limits.  SPLEEN: Within normal limits.  PANCREAS: Within normal limits.  ADRENALS: Within normal limits.  KIDNEYS/URETERS: Low-attenuation lesion within left kidney too small to   accurately characterize. No hydronephrosis. Nonobstructing left lower   pole renal calculus.    BLADDER: Within normal limits.  REPRODUCTIVE ORGANS: Obscured by streak artifact.    BOWEL: No bowel obstruction. Extensive colonic diverticulosis without CT   evidence of acute diverticulitis. Appendix is thickened and hyperemic   with associated periappendiceal inflammation and small fluid. No discrete   extraluminal gas or drainable fluidcollection.  PERITONEUM: No ascites.  VESSELS: Atherosclerotic changes.  RETROPERITONEUM/LYMPH NODES: No lymphadenopathy.  ABDOMINAL WALL: Within normal limits.  BONES: Status post bilateral hip arthroplasties. L5-S1 posterior lumbar   fusion.    IMPRESSION:  Acute appendicitis. No extraluminal gas or drainable fluid collection.    < end of copied text >    Imaging Personally Reviewed: [X] YES [ ] NO     PTT - ( 28 Aug 2022 03:15 )  PTT:31.1 sec GRETA HAY  82y  Male    83yo M with PMH UC, HTN, hypothyroid, HLD, asthma, chronic back pain, anxiety, GERD, hiatal hernia, generalized arthritis, s/p bilateral total hip replacement, s/p multiple ventral hernia repairs (1990, Dr. Goldberg, unsure if mesh was used), s/p bilateral knee arthroscopies, s/p L5-S1 laminectomy/fusion (2017), presents with 2 day history of worsening 7/10 abdominal pain which began in the central lower abdomen and migrating to the RLQ. Patient states that pain began on Thursday night (8/25) with tremendous abdominal pain and "thought my stomach was going to explode." Patient drank Britany-[place] believing it to be gas pain which helped slightly; however, he struggled to sleep as he could not find a comfortable position. Pain slightly improved on 8/26 but patient still wasn't feeling well; he reported being able to successfully pass a BM. On 8/27, he was cleaning his pool and had dinner but pain increased significantly when he went to lie down. Currently, pain is rated to be a 5/10 but patient states that he received Morphine and Dilaudid which has provided adequate pain control.     in ED:  VS: /74, HR 67, RR 18, SpO2 97% on RA  Labs significant for: RBC 3.66, .5  CXR: On wet read, when compared to prior imaging on 1/16/2018 shows increased haziness on medial aspect of R lung  CT abd/pelvis: Acute appendicitis. No extraluminal gas or drainable fluid collection.  UA: Clear with proteinuria, small blood  EKG: NSR at 79 bpm  Given: Morphine 4mg, Zofran, Dilaudid 1mg    PAST MEDICAL & SURGICAL HISTORY:  HTN (hypertension)    Hypercholesteremia    Reflux esophagitis    Hypothyroid    Arthritis, generalized    Back pain    Hiatal hernia    Gout  - Right big toe, last attack in 1973    Degenerative disc disease, lumbar and cervical    Mild intermittent asthma without complication  last inhaler use in December, 2018    Depression    Ulcerative colitis    Elective surgery  laminectomy with fusion L5-S1,  2017    Elective surgery  left shoulder 2000    Elective surgery  hernia repair  - umbilical,  1990    Elective surgery  bilat knee arthroscopy, 1990s    Elective surgery  bilat cataract, 2016    H/O hernia repair  ventral, open, 2004    History of total hip replacement, 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 denies chills, fatigue, weakness  HEENT: denies blurred vision, sore throat  SKIN: denies new lesions, rash  CARDIOVASCULAR: denies chest pain, chest pressure, palpitations  RESPIRATORY: denies shortness of breath, sputum production  GASTROINTESTINAL: +abdominal pain. denies nausea, vomiting, diarrhea  GENITOURINARY: denies dysuria, discharge  NEUROLOGICAL: +numbness of B/L hands and wrists 2/2 arthritis. no headache, focal weakness  MUSCULOSKELETAL: denies new joint pain, muscle aches  HEMATOLOGIC: denies gross bleeding, bruising  LYMPHATICS: denies enlarged lymph nodes, extremity swelling  PSYCHIATRIC: denies recent changes in anxiety, depression  ENDOCRINOLOGIC: denies sweating, cold or heat intolerance    PHYSICAL EXAM:  T(C): 36.7 (08-27-22 @ 21:53), Max: 36.7 (08-27-22 @ 21:53)  HR: 67 (08-27-22 @ 21:53) (67 - 67)  BP: 133/74 (08-27-22 @ 21:53) (133/74 - 133/74)  RR: 18 (08-27-22 @ 21:53) (18 - 18)  SpO2: 97% (08-27-22 @ 21:53) (97% - 97%)    Parameters below as of 27 Aug 2022 21:53  Patient On (Oxygen Delivery Method): room air    GENERAL: patient appears well, no acute distress, conversant  EYES: anicteric sclerae, no exudates  ENMT: oropharynx clear without erythema, no exudates, moist mucous membranes  LUNGS: clear to auscultation bilaterally, no rales or wheezing  HEART: S1/S2, regular rate and rhythm, systolic murmur auscultated on exam, no lower extremity edema  GASTROINTESTINAL: +abdomen soft but distended and tender to palpation, most noticeable in RLQ. +Rovsing sign, diminished bowel sounds, no palpable masses  INTEGUMENT: good skin turgor, warm skin, appears well perfused  MUSCULOSKELETAL: no clubbing or cyanosis, no obvious deformity  NEUROLOGIC: awake, alert, oriented x3, good muscle tone in 4 extremities, no obvious sensory deficits  PSYCHIATRIC: mood is good, affect is congruent, linear and logical thought process  HEME/LYMPH: no obvious ecchymosis or petechiae       Consultant(s) Notes Reviewed: [X] YES [ ] NO  Care discussed with Consultants/Other Providers: [X] YES  [ ] NO    LABS:  CBC Full  -  ( 27 Aug 2022 22:30 )  WBC Count : 9.41 K/uL  RBC Count : 3.66 M/uL  Hemoglobin : 12.8 g/dL  Hematocrit : 37.5 %  Platelet Count - Automated : 423 K/uL  Mean Cell Volume : 102.5 fl  Mean Cell Hemoglobin : 35.0 pg  Mean Cell Hemoglobin Concentration : 34.1 gm/dL  Auto Neutrophil # : 9.03 K/uL  Auto Lymphocyte # : 0.19 K/uL  Auto Monocyte # : 0.19 K/uL  Auto Eosinophil # : 0.00 K/uL  Auto Basophil # : 0.00 K/uL  Auto Neutrophil % : 93.0 %  Auto Lymphocyte % : 2.0 %  Auto Monocyte % : 2.0 %  Auto Eosinophil % : 0.0 %  Auto Basophil % : 0.0 %    08-27    137  |  101  |  21  ----------------------------<  101<H>  3.9   |  31  |  1.00    Ca    9.2      27 Aug 2022 22:30    TPro  8.1  /  Alb  4.2  /  TBili  1.1  /  DBili  x   /  AST  14<L>  /  ALT  17  /  AlkPhos  63  08-27      PT/INR - ( 28 Aug 2022 03:15 )   PT: 13.3 sec;   INR: 1.14 ratio      RADIOLOGY & ADDITIONAL TESTS:  < from: CT Abdomen and Pelvis w/ IV Cont (08.28.22 @ 00:53) >  INTERPRETATION:  CLINICAL INFORMATION: Right lower quadrant abdominal   pain.    COMPARISON: None.    CONTRAST/COMPLICATIONS:  IV Contrast: Omnipaque 350  90 cc administered   10 cc discarded  Oral Contrast: NONE  Complications: None reported at time of study completion    PROCEDURE:  CT of the Abdomen and Pelvis was performed.  Sagittal and coronal reformats were performed.    FINDINGS:  LOWER CHEST: Coronary artery calcification.    LIVER: Within normal limits.  BILE DUCTS: Normal caliber.  GALLBLADDER: Within normal limits.  SPLEEN: Within normal limits.  PANCREAS: Within normal limits.  ADRENALS: Within normal limits.  KIDNEYS/URETERS: Low-attenuation lesion within left kidney too small to   accurately characterize. No hydronephrosis. Nonobstructing left lower   pole renal calculus.    BLADDER: Within normal limits.  REPRODUCTIVE ORGANS: Obscured by streak artifact.    BOWEL: No bowel obstruction. Extensive colonic diverticulosis without CT   evidence of acute diverticulitis. Appendix is thickened and hyperemic   with associated periappendiceal inflammation and small fluid. No discrete   extraluminal gas or drainable fluidcollection.  PERITONEUM: No ascites.  VESSELS: Atherosclerotic changes.  RETROPERITONEUM/LYMPH NODES: No lymphadenopathy.  ABDOMINAL WALL: Within normal limits.  BONES: Status post bilateral hip arthroplasties. L5-S1 posterior lumbar   fusion.    IMPRESSION:  Acute appendicitis. No extraluminal gas or drainable fluid collection.    < end of copied text >    Imaging Personally Reviewed: [X] YES [ ] NO

## 2022-08-28 NOTE — H&P ADULT - NSHPPHYSICALEXAM_GEN_ALL_CORE
GENERAL:  Well-nourished, well-developed male lying comfortably in bed in NAD.  HEENT:  NC/AT. Sclera white. Mucous membranes moist.  CARDIO:  Regular rate and rhythm.  No murmur, gallop or rub appreciated.  RESPIRATORY:  Nonlabored breathing, no accessory muscle use. Lungs clear to auscultation bilaterally.  No wheezing, rales or rhonchi appreciated.  ABDOMEN:  Softly distended, moderately TTP in RLQ. +Rovsing's sign. No rigidity, rebound tenderness or guarding.  SKIN:  No jaundice, pallor, or cyanosis  NEURO:  A&O x 3

## 2022-08-28 NOTE — BRIEF OPERATIVE NOTE - NSICDXBRIEFPOSTOP_GEN_ALL_CORE_FT
POST-OP DIAGNOSIS:  Acute appendicitis with localized peritonitis 28-Aug-2022 22:10:51  Andrew Bernal J

## 2022-08-28 NOTE — H&P ADULT - PROBLEM SELECTOR PLAN 1
- Admit to surgical service with medical consult  - NPO, IV fluids, PPI  - Pain control, zofran PRN  - IV zosyn  - DVT prophylaxis with SCDs  - Tentative plan for OR in the AM  - Discussed with Dr. Bernal - Admit to surgical service with medical consult  - NPO, IV fluids, PPI  - Pain control, zofran PRN  - Cefotetan  - DVT prophylaxis with SCDs  - Tentative plan for OR in the AM  - Discussed with Dr. Bernal

## 2022-08-28 NOTE — CONSULT NOTE ADULT - PROBLEM SELECTOR RECOMMENDATION 9
- Continue home Proventil Patient presents with abdominal pain, CT abd/pelvis confirmed acute appendicitis w/o extraluminal gas or drainable fluid collection.  - Admit to surgical service with medical consult  - NPO except meds, IV fluids, PPI  - Pain control, Zofran PRN  - Cefotetan  - Tentative plan for OR in the AM    Patient currently has no active conditions. No signs of ischemia, acute decompensated heart failure, no unstable arrhythmias noted. Baseline functional capacity is >4 METS. RCRI score is 1. Patient is currently hemodynamically stable. Patient is medically optimized at this time and understands the inherent risks of surgery. Routine hemodynamic monitoring is suggested. Patient presents with abdominal pain, CT abd/pelvis confirmed acute appendicitis w/o extraluminal gas or drainable fluid collection.  - Admit to surgical service with medical consult  - NPO except meds, IV fluids, PPI  - Pain control, Zofran PRN  - continue iv abx   - Tentative plan for OR in the AM    Patient currently has no active conditions. No signs of ischemia, acute decompensated heart failure, no unstable arrhythmias noted. Baseline functional capacity is >4 METS. RCRI score is 1. Patient is currently hemodynamically stable. Patient is medically optimized at this time and understands the inherent risks of surgery. Routine hemodynamic monitoring is suggested.

## 2022-08-28 NOTE — PATIENT PROFILE ADULT - FALL HARM RISK - RISK INTERVENTIONS
Assistance OOB with selected safe patient handling equipment/Assistance with ambulation/Communicate Fall Risk and Risk Factors to all staff, patient, and family/Monitor gait and stability/Reinforce activity limits and safety measures with patient and family/Sit up slowly, dangle for a short time, stand at bedside before walking/Use of alarms - bed, chair and/or voice tab/Visual Cue: Yellow wristband/Bed in lowest position, wheels locked, appropriate side rails in place/Call bell, personal items and telephone in reach/Instruct patient to call for assistance before getting out of bed or chair/Non-slip footwear when patient is out of bed/[place] to call system/Physically safe environment - no spills, clutter or unnecessary equipment/Purposeful Proactive Rounding/Room/bathroom lighting operational, light cord in reach

## 2022-08-28 NOTE — H&P ADULT - NSHPREVIEWOFSYSTEMS_GEN_ALL_CORE
CONSTITUTIONAL: +Chills. No weakness, fevers.  EYES/ENT: No visual changes;  No vertigo or throat pain   NECK: No pain or stiffness  RESPIRATORY: No cough, wheezing, hemoptysis; No shortness of breath  CARDIOVASCULAR: No chest pain or palpitations  GASTROINTESTINAL: +RLQ pain. No epigastric pain. No nausea, vomiting, or hematemesis; No diarrhea or constipation. No melena or hematochezia.  GENITOURINARY: No dysuria, frequency or hematuria  NEUROLOGICAL: No numbness or weakness  SKIN: No itching, burning, rashes, or lesions   All other review of systems is negative unless indicated above.

## 2022-08-28 NOTE — H&P ADULT - NSHPLABSRESULTS_GEN_ALL_CORE
LABS:             12.8   9.41  )-----------( 423      ( 27 Aug 2022 22:30 )             37.5     08-27    137  |  101  |  21  ----------------------------<  101<H>  3.9   |  31  |  1.00    Ca    9.2      27 Aug 2022 22:30    TPro  8.1  /  Alb  4.2  /  TBili  1.1  /  DBili  x   /  AST  14<L>  /  ALT  17  /  AlkPhos  63  08-27    RADIOLOGY:  < from: CT Abdomen and Pelvis w/ IV Cont (08.28.22 @ 00:53) >    BOWEL: No bowel obstruction. Extensive colonic diverticulosis without CT   evidence of acute diverticulitis. Appendix is thickened and hyperemic   with associated periappendiceal inflammation and small fluid. No discrete   extraluminal gas or drainable fluicollection.    IMPRESSION:  Acute appendicitis. No extraluminal gas or drainable fluid collection.

## 2022-08-28 NOTE — PROGRESS NOTE ADULT - SUBJECTIVE AND OBJECTIVE BOX
Post Operative Note  Patient: GRETA HAY 82y (1940) Male   MRN: [id number]  Location: [address]  Visit: 08-28-22 Inpatient  Date: 08-28-22 @ 16:53    Procedure: S/P lap appy    Subjective:   Patient seen and examined at bedside. complaint of mild swelling in L eye.  Admits to mild post operative pain, awaiting clear diet, denies flatus and bowel movement. Denies voiding post-operatively.  Patient denies any fevers, chills, chest pain, shortness of breath,  nausea, vomiting or diarrhea.    Objective:  Vitals: T(F): 97.5 (08-28-22 @ 14:42), Max: 99.2 (08-28-22 @ 07:57)  HR: 67 (08-28-22 @ 14:42)  BP: 146/75 (08-28-22 @ 14:42) (112/62 - 146/75)  RR: 16 (08-28-22 @ 14:42)  SpO2: 92% (08-28-22 @ 14:42)  Vent Settings:     In:   08-28-22 @ 07:01  -  08-28-22 @ 16:53  --------------------------------------------------------  IN: 275 mL      IV Fluids: lactated ringers. 1000 milliLiter(s) (100 mL/Hr) IV Continuous <Continuous>  multivitamin 1 Tablet(s) Oral daily      Out:   08-28-22 @ 07:01  -  08-28-22 @ 16:53  --------------------------------------------------------  OUT: 0 mL      EBL:     Voided Urine:   08-28-22 @ 07:01  -  08-28-22 @ 16:53  --------------------------------------------------------  OUT: 0 mL    PHYSICAL EXAM:  GENERAL: No acute distress, well-developed  HEAD:  Atraumatic, Normocephalic  ABDOMEN: Soft, appropriately-tender, minimally-distended; incisions clean dry and intact.  NEUROLOGY: A&O x 3, no focal deficits    Medications: [Standing]  acetaminophen     Tablet .. 1000 milliGRAM(s) Oral every 6 hours  ALBUTerol    90 MICROgram(s) HFA Inhaler 2 Puff(s) Inhalation every 6 hours PRN  amLODIPine   Tablet 10 milliGRAM(s) Oral daily  artificial  tears Solution 1 Drop(s) Left EYE five times a day PRN  cefoTEtan  IVPB 2 Gram(s) IV Intermittent every 12 hours  diphenhydrAMINE 25 milliGRAM(s) Oral every 4 hours PRN  fenofibrate Tablet 145 milliGRAM(s) Oral daily  hydrochlorothiazide 25 milliGRAM(s) Oral daily  HYDROmorphone  Injectable 0.5 milliGRAM(s) IV Push every 4 hours PRN  lactated ringers. 1000 milliLiter(s) IV Continuous <Continuous>  levothyroxine 212.5 MICROGram(s) Oral daily  losartan 100 milliGRAM(s) Oral daily  mesalamine Suppository 1000 milliGRAM(s) Rectal at bedtime  multivitamin 1 Tablet(s) Oral daily  ondansetron Injectable 4 milliGRAM(s) IV Push every 6 hours PRN  oxyCODONE    IR 5 milliGRAM(s) Oral every 4 hours PRN  pantoprazole    Tablet 40 milliGRAM(s) Oral before breakfast  senna 2 Tablet(s) Oral at bedtime  temazepam 15 milliGRAM(s) Oral at bedtime PRN  traZODone 100 milliGRAM(s) Oral at bedtime PRN    Medications: [PRN]  acetaminophen     Tablet .. 1000 milliGRAM(s) Oral every 6 hours  ALBUTerol    90 MICROgram(s) HFA Inhaler 2 Puff(s) Inhalation every 6 hours PRN  amLODIPine   Tablet 10 milliGRAM(s) Oral daily  artificial  tears Solution 1 Drop(s) Left EYE five times a day PRN  cefoTEtan  IVPB 2 Gram(s) IV Intermittent every 12 hours  diphenhydrAMINE 25 milliGRAM(s) Oral every 4 hours PRN  fenofibrate Tablet 145 milliGRAM(s) Oral daily  hydrochlorothiazide 25 milliGRAM(s) Oral daily  HYDROmorphone  Injectable 0.5 milliGRAM(s) IV Push every 4 hours PRN  lactated ringers. 1000 milliLiter(s) IV Continuous <Continuous>  levothyroxine 212.5 MICROGram(s) Oral daily  losartan 100 milliGRAM(s) Oral daily  mesalamine Suppository 1000 milliGRAM(s) Rectal at bedtime  multivitamin 1 Tablet(s) Oral daily  ondansetron Injectable 4 milliGRAM(s) IV Push every 6 hours PRN  oxyCODONE    IR 5 milliGRAM(s) Oral every 4 hours PRN  pantoprazole    Tablet 40 milliGRAM(s) Oral before breakfast  senna 2 Tablet(s) Oral at bedtime  temazepam 15 milliGRAM(s) Oral at bedtime PRN  traZODone 100 milliGRAM(s) Oral at bedtime PRN    Labs:                        12.8   9.41  )-----------( 423      ( 27 Aug 2022 22:30 )             37.5     08-27    137  |  101  |  21  ----------------------------<  101<H>  3.9   |  31  |  1.00    Ca    9.2      27 Aug 2022 22:30    TPro  8.1  /  Alb  4.2  /  TBili  1.1  /  DBili  x   /  AST  14<L>  /  ALT  17  /  AlkPhos  63  08-27    PT/INR - ( 28 Aug 2022 03:15 )   PT: 13.3 sec;   INR: 1.14 ratio         PTT - ( 28 Aug 2022 03:15 )  PTT:31.1 sec      Imaging:  No post-op imaging studies    Assessment:  82yMale patient S/P lap appy currently doing well with some eye soreness    Plan:  - incentive spirometer  - pain control  - OOB  - CLD  - awaiting TOV  - serial abdominal exams  - labs in am    Surgical Team Contact Information  Spectralink: Ext: [phone] or [phone]  Pager: [phone]    Date/Time: 08-28-22 @ 16:53

## 2022-08-28 NOTE — CHART NOTE - NSCHARTNOTEFT_GEN_A_CORE
Called by RN for Pt complaining of severe headache. Pt seen and examined at bedside. Pt admits to severe headache which woke him up from his sleep. He said he has had multiple procedures in the past before but has never had such a bad headache before. He did have appendectomy today. He says his headache is a 10/10, throbbing in bilateral temples. He admits to photosensitivity. Pt denies shortness o f breath, chest pain, abdominal pain, weakness, weakness in extremities, or change in sensation.       T(C): 37 (22 @ 19:09), Max: 37.3 (22 @ 07:57)  HR: 77 (22 @ 19:09) (67 - 78)  BP: 175/82 (22 @ 19:09) (112/62 - 175/82)  RR: 16 (22 @ 19:09) (11 - 20)  SpO2: 93% (22 @ 19:09) (92% - 100%)  Wt(kg): --    Physical :  Gen- In moderate distress due to headache   Cardio - s+1,s+2, rrr, no murmur  Lung - cta b/l, no wheeze, no rhonchi, no rales   Abdomen- +BS, NT/ND, no guarding, no rebound, no masses  Ext- no edema, 2+ pulses b/l  Neuro- alert and oriented x4, moving all 4 extremities, CN 3 grossly intact, strength 5/5 b/l upper and lower extremities.     LABS:                        12.8   9.41  )-----------( 423      ( 27 Aug 2022 22:30 )             37.5         137  |  101  |  21  ----------------------------<  101<H>  3.9   |  31  |  1.00    Ca    9.2      27 Aug 2022 22:30    TPro  8.1  /  Alb  4.2  /  TBili  1.1  /  DBili  x   /  AST  14<L>  /  ALT  17  /  AlkPhos  63      PT/INR - ( 28 Aug 2022 03:15 )   PT: 13.3 sec;   INR: 1.14 ratio         PTT - ( 28 Aug 2022 03:15 )  PTT:31.1 sec  Urinalysis Basic - ( 27 Aug 2022 23:27 )    Color: Yellow / Appearance: Clear / S.020 / pH: x  Gluc: x / Ketone: Negative  / Bili: Negative / Urobili: 1   Blood: x / Protein: 15 / Nitrite: Negative   Leuk Esterase: Negative / RBC: 0-2 /HPF / WBC 0-2   Sq Epi: x / Non Sq Epi: Negative / Bacteria: Negative              Assessment/Plan  83yo M with PMH UC, HTN, hypothyroid, HLD, asthma, chronic back pain, anxiety, GERD, hiatal hernia, generalized arthritis, s/p bilateral total hip replacement, s/p multiple ventral hernia repairs (, Dr. Goldberg, unsure if mesh was used), s/p bilateral knee arthroscopies, s/p L5-S1 laminectomy/fusion (), presents with 2 day history of worsening 7/10 abdominal pain, admitted for acute appendicitis. Pt now with severe headache.    - Urgent CT head to rule out any acute intracranial pathology since headache is extreme severe  - will give PRN Oxy now   - RN to call if any changes    Dr. Fox  PGY3  [phone] Called by RN for Pt complaining of severe headache. Pt seen and examined at bedside. Pt admits to severe headache which woke him up from his sleep. He said he has had multiple procedures in the past before but has never had such a bad headache before. He did have appendectomy today. He says his headache is a 10/10, throbbing in bilateral temples. He admits to photosensitivity. Pt denies shortness o f breath, chest pain, abdominal pain, weakness, weakness in extremities, or change in sensation.       T(C): 37 (22 @ 19:09), Max: 37.3 (22 @ 07:57)  HR: 77 (22 @ 19:09) (67 - 78)  BP: 175/82 (22 @ 19:09) (112/62 - 175/82)  RR: 16 (22 @ 19:09) (11 - 20)  SpO2: 93% (22 @ 19:09) (92% - 100%)  Wt(kg): --    Physical :  Gen- In moderate distress due to headache   Cardio - s+1,s+2, rrr, no murmur  Lung - cta b/l, no wheeze, no rhonchi, no rales   Abdomen- +BS, NT/ND, no guarding, no rebound, no masses  Ext- no edema, 2+ pulses b/l  Neuro- alert and oriented x4, moving all 4 extremities, CN 3 grossly intact, strength 5/5 b/l upper and lower extremities.     LABS:                        12.8   9.41  )-----------( 423      ( 27 Aug 2022 22:30 )             37.5         137  |  101  |  21  ----------------------------<  101<H>  3.9   |  31  |  1.00    Ca    9.2      27 Aug 2022 22:30    TPro  8.1  /  Alb  4.2  /  TBili  1.1  /  DBili  x   /  AST  14<L>  /  ALT  17  /  AlkPhos  63      PT/INR - ( 28 Aug 2022 03:15 )   PT: 13.3 sec;   INR: 1.14 ratio         PTT - ( 28 Aug 2022 03:15 )  PTT:31.1 sec  Urinalysis Basic - ( 27 Aug 2022 23:27 )    Color: Yellow / Appearance: Clear / S.020 / pH: x  Gluc: x / Ketone: Negative  / Bili: Negative / Urobili: 1   Blood: x / Protein: 15 / Nitrite: Negative   Leuk Esterase: Negative / RBC: 0-2 /HPF / WBC 0-2   Sq Epi: x / Non Sq Epi: Negative / Bacteria: Negative              Assessment/Plan  83yo M with PMH UC, HTN, hypothyroid, HLD, asthma, chronic back pain, anxiety, GERD, hiatal hernia, generalized arthritis, s/p bilateral total hip replacement, s/p multiple ventral hernia repairs (, Dr. Goldberg, unsure if mesh was used), s/p bilateral knee arthroscopies, s/p L5-S1 laminectomy/fusion (), presents with 2 day history of worsening 7/10 abdominal pain, admitted for acute appendicitis. Pt now with severe headache.    - Urgent CT head to rule out any acute intracranial pathology since headache is extreme severe  - will give PRN Oxy now   - RN to call if any changes    Dr. Fox  PGY3  [phone]    -------------------------------------  Addendum 10:40PM    Pt fell asleep and woke up with a headache again. CT negative.   - CT head- No acute intracranial hemorrhage, mass effect, or evidence of acute vascular territorial infarction. If clinical symptoms persist or worsen, more sensitive evaluation with brain MRI may be obtained, if no contraindications exist.  - Will offer patient PRN insomnia medication and continue current pain regimen.  - RN to call if any changes    Dr. Fox   PGY3   [phone]

## 2022-08-28 NOTE — ED ADULT NURSE NOTE - SUICIDE SCREENING QUESTION 3
"High Cholesterol    High cholesterol is a condition in which the blood has high levels of a white, waxy, fat-like substance (cholesterol). The human body needs small amounts of cholesterol. The liver makes all the cholesterol that the body needs. Extra (excess) cholesterol comes from the food that we eat.  Cholesterol is carried from the liver by the blood through the blood vessels. If you have high cholesterol, deposits (plaques) may build up on the walls of your blood vessels (arteries). Plaques make the arteries narrower and stiffer. Cholesterol plaques increase your risk for heart attack and stroke. Work with your health care provider to keep your cholesterol levels in a healthy range.  What increases the risk?  This condition is more likely to develop in people who:  · Eat foods that are high in animal fat (saturated fat) or cholesterol.  · Are overweight.  · Are not getting enough exercise.  · Have a family history of high cholesterol.  What are the signs or symptoms?  There are no symptoms of this condition.  How is this diagnosed?  This condition may be diagnosed from the results of a blood test.  · If you are older than age 20, your health care provider may check your cholesterol every 4-6 years.  · You may be checked more often if you already have high cholesterol or other risk factors for heart disease.  The blood test for cholesterol measures:  · \"Bad\" cholesterol (LDL cholesterol). This is the main type of cholesterol that causes heart disease. The desired level for LDL is less than 100.  · \"Good\" cholesterol (HDL cholesterol). This type helps to protect against heart disease by cleaning the arteries and carrying the LDL away. The desired level for HDL is 60 or higher.  · Triglycerides. These are fats that the body can store or burn for energy. The desired number for triglycerides is lower than 150.  · Total cholesterol. This is a measure of the total amount of cholesterol in your blood, including LDL " Dr. Richardson cholesterol, HDL cholesterol, and triglycerides. A healthy number is less than 200.  How is this treated?  This condition is treated with diet changes, lifestyle changes, and medicines.  Diet changes  · This may include eating more whole grains, fruits, vegetables, nuts, and fish.  · This may also include cutting back on red meat and foods that have a lot of added sugar.  Lifestyle changes  · Changes may include getting at least 40 minutes of aerobic exercise 3 times a week. Aerobic exercises include walking, biking, and swimming. Aerobic exercise along with a healthy diet can help you maintain a healthy weight.  · Changes may also include quitting smoking.  Medicines  · Medicines are usually given if diet and lifestyle changes have failed to reduce your cholesterol to healthy levels.  · Your health care provider may prescribe a statin medicine. Statin medicines have been shown to reduce cholesterol, which can reduce the risk of heart disease.  Follow these instructions at home:  Eating and drinking  If told by your health care provider:  · Eat chicken (without skin), fish, veal, shellfish, ground turkey breast, and round or loin cuts of red meat.  · Do not eat fried foods or fatty meats, such as hot dogs and salami.  · Eat plenty of fruits, such as apples.  · Eat plenty of vegetables, such as broccoli, potatoes, and carrots.  · Eat beans, peas, and lentils.  · Eat grains such as barley, rice, couscous, and bulgur wheat.  · Eat pasta without cream sauces.  · Use skim or nonfat milk, and eat low-fat or nonfat yogurt and cheeses.  · Do not eat or drink whole milk, cream, ice cream, egg yolks, or hard cheeses.  · Do not eat stick margarine or tub margarines that contain trans fats (also called partially hydrogenated oils).  · Do not eat saturated tropical oils, such as coconut oil and palm oil.  · Do not eat cakes, cookies, crackers, or other baked goods that contain trans fats.    General instructions  · Exercise as  directed by your health care provider. Increase your activity level with activities such as gardening, walking, and taking the stairs.  · Take over-the-counter and prescription medicines only as told by your health care provider.  · Do not use any products that contain nicotine or tobacco, such as cigarettes and e-cigarettes. If you need help quitting, ask your health care provider.  · Keep all follow-up visits as told by your health care provider. This is important.  Contact a health care provider if:  · You are struggling to maintain a healthy diet or weight.  · You need help to start on an exercise program.  · You need help to stop smoking.  Get help right away if:  · You have chest pain.  · You have trouble breathing.  This information is not intended to replace advice given to you by your health care provider. Make sure you discuss any questions you have with your health care provider.  Document Revised: 12/21/2018 Document Reviewed: 06/17/2017  [employer] Patient Education © 2020 [employer]  Hypertension, Adult  Hypertension is another name for high blood pressure. High blood pressure forces your heart to work harder to pump blood. This can cause problems over time.  There are two numbers in a blood pressure reading. There is a top number (systolic) over a bottom number (diastolic). It is best to have a blood pressure that is below 120/80. Healthy choices can help lower your blood pressure, or you may need medicine to help lower it.  What are the causes?  The cause of this condition is not known. Some conditions may be related to high blood pressure.  What increases the risk?  · Smoking.  · Having type 2 diabetes mellitus, high cholesterol, or both.  · Not getting enough exercise or physical activity.  · Being overweight.  · Having too much fat, sugar, calories, or salt (sodium) in your diet.  · Drinking too much alcohol.  · Having long-term (chronic) kidney disease.  · Having a family history of high blood  pressure.  · Age. Risk increases with age.  · Race. You may be at higher risk if you are .  · Gender. Men are at higher risk than women before age 45. After age 65, women are at higher risk than men.  · Having obstructive sleep apnea.  · Stress.  What are the signs or symptoms?  · High blood pressure may not cause symptoms. Very high blood pressure (hypertensive crisis) may cause:  ? Headache.  ? Feelings of worry or nervousness (anxiety).  ? Shortness of breath.  ? Nosebleed.  ? A feeling of being sick to your stomach (nausea).  ? Throwing up (vomiting).  ? Changes in how you see.  ? Very bad chest pain.  ? Seizures.  How is this treated?  · This condition is treated by making healthy lifestyle changes, such as:  ? Eating healthy foods.  ? Exercising more.  ? Drinking less alcohol.  · Your health care provider may prescribe medicine if lifestyle changes are not enough to get your blood pressure under control, and if:  ? Your top number is above 130.  ? Your bottom number is above 80.  · Your personal target blood pressure may vary.  Follow these instructions at home:  Eating and drinking    · If told, follow the DASH eating plan. To follow this plan:  ? Fill one half of your plate at each meal with fruits and vegetables.  ? Fill one fourth of your plate at each meal with whole grains. Whole grains include whole-wheat pasta, brown rice, and whole-grain bread.  ? Eat or drink low-fat dairy products, such as skim milk or low-fat yogurt.  ? Fill one fourth of your plate at each meal with low-fat (lean) proteins. Low-fat proteins include fish, chicken without skin, eggs, beans, and tofu.  ? Avoid fatty meat, cured and processed meat, or chicken with skin.  ? Avoid pre-made or processed food.  · Eat less than 1,500 mg of salt each day.  · Do not drink alcohol if:  ? Your doctor tells you not to drink.  ? You are pregnant, may be pregnant, or are planning to become pregnant.  · If you drink  alcohol:  ? Limit how much you use to:  § 0-1 drink a day for women.  § 0-2 drinks a day for men.  ? Be aware of how much alcohol is in your drink. In the [place] one drink equals one 12 oz bottle of beer (355 mL), one 5 oz glass of wine (148 mL), or one 1½ oz glass of hard liquor (44 mL).  Lifestyle    · Work with your doctor to stay at a healthy weight or to lose weight. Ask your doctor what the best weight is for you.  · Get at least 30 minutes of exercise most days of the week. This may include walking, swimming, or biking.  · Get at least 30 minutes of exercise that strengthens your muscles (resistance exercise) at least 3 days a week. This may include lifting weights or doing Pilates.  · Do not use any products that contain nicotine or tobacco, such as cigarettes, e-cigarettes, and chewing tobacco. If you need help quitting, ask your doctor.  · Check your blood pressure at home as told by your doctor.  · Keep all follow-up visits as told by your doctor. This is important.  Medicines  · Take over-the-counter and prescription medicines only as told by your doctor. Follow directions carefully.  · Do not skip doses of blood pressure medicine. The medicine does not work as well if you skip doses. Skipping doses also puts you at risk for problems.  · Ask your doctor about side effects or reactions to medicines that you should watch for.  Contact a doctor if you:  · Think you are having a reaction to the medicine you are taking.  · Have headaches that keep coming back (recurring).  · Feel dizzy.  · Have swelling in your ankles.  · Have trouble with your vision.  Get help right away if you:  · Get a very bad headache.  · Start to feel mixed up (confused).  · Feel weak or numb.  · Feel faint.  · Have very bad pain in your:  ? Chest.  ? Belly (abdomen).  · Throw up more than once.  · Have trouble breathing.  Summary  · Hypertension is another name for high blood pressure.  · High blood pressure forces your heart to work  harder to pump blood.  · For most people, a normal blood pressure is less than 120/80.  · Making healthy choices can help lower blood pressure. If your blood pressure does not get lower with healthy choices, you may need to take medicine.  This information is not intended to replace advice given to you by your health care provider. Make sure you discuss any questions you have with your health care provider.  Document Revised: 08/28/2019 Document Reviewed: 08/28/2019  [employer] Patient Education © 2020 [employer]     Patient unable to complete

## 2022-08-28 NOTE — PROVIDER CONTACT NOTE (OTHER) - ACTION/TREATMENT ORDERED:
22:35 CT scan result reviewed 22:35 CT scan result reviewed by Dr Fox 20:00 Po oxycodone now, urgent CT Head   22:35 CT scan result reviewed by Dr Fox

## 2022-08-28 NOTE — PROVIDER CONTACT NOTE (OTHER) - SITUATION
Pt c/o severe frontal headache with some photosensitivity, took tylenol at around 6PM without relief. Denies any other symptoms. Pt c/o severe frontal headache with some photosensitivity, took tylenol at around 6PM without relief. Denies any other symptoms.  22:35 Pt c/o severe frontal headache with some photosensitivity, took tylenol at around 6PM without relief. Denies any other symptoms.  22:35 Pt c/o persistent headache, denies any other symptoms.

## 2022-08-28 NOTE — ED ADULT NURSE NOTE - NSICDXPASTSURGICALHX_GEN_ALL_CORE_FT
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    
06-Feb-2017

## 2022-08-28 NOTE — PROVIDER CONTACT NOTE (OTHER) - ASSESSMENT
A&Ox4. Color good, skin dry and warm. Respirations even and unlabored. Abdomen lap incisions with steri strips CDI. RAPP well, ambulates with steady gait.   VS: Temp 98.6 HR 77 /82 RR 16 SpO2 93% RA

## 2022-08-28 NOTE — H&P ADULT - ASSESSMENT
82 year old male with PMH UC, HTN, hypothyroid, HLD, asthma, chronic back pain, anxiety, GERD, hiatal hernia, s/p bilateral THR, s/p multiple ventral hernia repairs (1990, Dr. Goldberg, unsure if mesh was used), s/p bilateral knee arthroscopies, s/p L5-S1 laminectomy/fusion (2017), with acute appendicitis. 82 year old male with PMH UC, HTN, hypothyroid, HLD, asthma, chronic back pain, anxiety, GERD, hiatal hernia, s/p bilateral THR, s/p multiple ventral hernia repairs (1990, Dr. Goldberg, unsure if mesh was used), s/p bilateral knee arthroscopies, s/p L5-S1 laminectomy/fusion (2017), with acute appendicitis.      Mr. Victoria was seen preoperatively in the ER and then holding area.      The history was confirmed and an examination personally performed.      The CT scan report and associated images were reviewed and the risks, benefits and nature of the operation were discussed at length.      These points included, but were not limited to the possibility of conversion to open surgery, the possibility of bowel resection or enterotomy, the approximate size/ number/ location of the incisions, the probability of "scars" and the possibility of residual abdominal wall deformity.  We have discussed that appendicitis represents an infectious process and that infection of the skin or an intra-abdominal abscess post-operatively is possible.  We have discussed the possibility of open incision sites with packing or wound care.  We have discussed the possible use of intra-peritoneal drains.  We have discussed that a "normal" appendix is possible and that no "guarantee" of a diagnosis can be provided.  We have discussed that further additional pathology may be noted during laparoscopy.      We have discussed that all abdominal surgery creates the possibility of abdominal wall hernia and future intra-abdominal adhesions.  We have discussed that medical complications unrelated to the operation itself are possible, including but not limited to cardiopulmonary issues, deep vein thrombosis/ pulmonary embolism and urinary retention.  We have discussed that general anesthesia with intubation is required and that a Foster catheter may be placed.       Mr. Victoria demonstrated good understanding and remained eager to proceed.  An intravenous dosing of antibiotic had been provided prior to the initiation of the procedure.

## 2022-08-28 NOTE — H&P ADULT - HISTORY OF PRESENT ILLNESS
82 year old male with PMH UC, HTN, hypothyroid, HLD, asthma, chronic back pain, anxiety, GERD, hiatal hernia, s/p bilateral THR, s/p multiple ventral hernia repairs (1990, Dr. Goldberg, unsure if mesh was used), s/p bilateral knee arthroscopies, s/p L5-S1 laminectomy/fusion (2017), presents with 2 day history of worsening 7/10 abdominal pain which began in the central lower abdomen and migrating to the RLQ. Patient was doing work on his pool today when he noticed pain had gotten so severe it became difficult to ambulate or bend down. He tried taking suellen seltzer without relief. Last BM this afternoon was normal. He also endorses chills, but denies fever. No chest pain, shortness of breath, nausea, vomiting, melena or hematochezia.

## 2022-08-28 NOTE — CONSULT NOTE ADULT - ATTENDING COMMENTS
83yo M with PMH UC, HTN, hypothyroid, HLD, asthma, chronic back pain, anxiety, GERD, hiatal hernia, generalized arthritis, s/p bilateral total hip replacement, s/p multiple ventral hernia repairs (1990, Dr. Goldberg, unsure if mesh was used), s/p bilateral knee arthroscopies, s/p L5-S1 laminectomy/fusion (2017), presents with 2 day history of worsening 7/10 abdominal pain, admitted for acute appendicitis.    Note as above. Edited personally where appropriate directly into the body of the note.

## 2022-08-29 ENCOUNTER — TRANSCRIPTION ENCOUNTER (OUTPATIENT)
Age: 82
End: 2022-08-29

## 2022-08-29 LAB
ANION GAP SERPL CALC-SCNC: 9 MMOL/L — SIGNIFICANT CHANGE UP (ref 5–17)
BUN SERPL-MCNC: 13 MG/DL — SIGNIFICANT CHANGE UP (ref 7–23)
CALCIUM SERPL-MCNC: 8.6 MG/DL — SIGNIFICANT CHANGE UP (ref 8.5–10.1)
CHLORIDE SERPL-SCNC: 102 MMOL/L — SIGNIFICANT CHANGE UP (ref 96–108)
CO2 SERPL-SCNC: 27 MMOL/L — SIGNIFICANT CHANGE UP (ref 22–31)
CREAT SERPL-MCNC: 1.5 MG/DL — HIGH (ref 0.5–1.3)
EGFR: 47 ML/MIN/1.73M2 — LOW
GLUCOSE SERPL-MCNC: 106 MG/DL — HIGH (ref 70–99)
HCT VFR BLD CALC: 32 % — LOW (ref 39–50)
HGB BLD-MCNC: 11.2 G/DL — LOW (ref 13–17)
MAGNESIUM SERPL-MCNC: 2.2 MG/DL — SIGNIFICANT CHANGE UP (ref 1.6–2.6)
MCHC RBC-ENTMCNC: 35 GM/DL — SIGNIFICANT CHANGE UP (ref 32–36)
MCHC RBC-ENTMCNC: 35.8 PG — HIGH (ref 27–34)
MCV RBC AUTO: 102.2 FL — HIGH (ref 80–100)
NRBC # BLD: 0 /100 WBCS — SIGNIFICANT CHANGE UP (ref 0–0)
PHOSPHATE SERPL-MCNC: 2.6 MG/DL — SIGNIFICANT CHANGE UP (ref 2.5–4.5)
PLATELET # BLD AUTO: 390 K/UL — SIGNIFICANT CHANGE UP (ref 150–400)
POTASSIUM SERPL-MCNC: 3.5 MMOL/L — SIGNIFICANT CHANGE UP (ref 3.5–5.3)
POTASSIUM SERPL-SCNC: 3.5 MMOL/L — SIGNIFICANT CHANGE UP (ref 3.5–5.3)
RBC # BLD: 3.13 M/UL — LOW (ref 4.2–5.8)
RBC # FLD: 14.8 % — HIGH (ref 10.3–14.5)
SODIUM SERPL-SCNC: 138 MMOL/L — SIGNIFICANT CHANGE UP (ref 135–145)
WBC # BLD: 5.58 K/UL — SIGNIFICANT CHANGE UP (ref 3.8–10.5)
WBC # FLD AUTO: 5.58 K/UL — SIGNIFICANT CHANGE UP (ref 3.8–10.5)

## 2022-08-29 PROCEDURE — 70551 MRI BRAIN STEM W/O DYE: CPT | Mod: 26

## 2022-08-29 PROCEDURE — 70544 MR ANGIOGRAPHY HEAD W/O DYE: CPT | Mod: 26,59

## 2022-08-29 PROCEDURE — 99233 SBSQ HOSP IP/OBS HIGH 50: CPT

## 2022-08-29 RX ORDER — MORPHINE SULFATE 50 MG/1
2 CAPSULE, EXTENDED RELEASE ORAL
Refills: 0 | Status: DISCONTINUED | OUTPATIENT
Start: 2022-08-29 | End: 2022-08-30

## 2022-08-29 RX ORDER — DEXAMETHASONE 0.5 MG/5ML
10 ELIXIR ORAL ONCE
Refills: 0 | Status: COMPLETED | OUTPATIENT
Start: 2022-08-29 | End: 2022-08-29

## 2022-08-29 RX ORDER — METOCLOPRAMIDE HCL 10 MG
10 TABLET ORAL ONCE
Refills: 0 | Status: COMPLETED | OUTPATIENT
Start: 2022-08-29 | End: 2022-08-29

## 2022-08-29 RX ORDER — PIPERACILLIN AND TAZOBACTAM 4; .5 G/20ML; G/20ML
3.38 INJECTION, POWDER, LYOPHILIZED, FOR SOLUTION INTRAVENOUS EVERY 8 HOURS
Refills: 0 | Status: DISCONTINUED | OUTPATIENT
Start: 2022-08-29 | End: 2022-08-29

## 2022-08-29 RX ORDER — SUMATRIPTAN SUCCINATE 4 MG/.5ML
6 INJECTION, SOLUTION SUBCUTANEOUS ONCE
Refills: 0 | Status: COMPLETED | OUTPATIENT
Start: 2022-08-29 | End: 2022-08-29

## 2022-08-29 RX ORDER — CEFOTETAN DISODIUM 1 G
2 VIAL (EA) INJECTION EVERY 12 HOURS
Refills: 0 | Status: DISCONTINUED | OUTPATIENT
Start: 2022-08-29 | End: 2022-08-30

## 2022-08-29 RX ORDER — KETOROLAC TROMETHAMINE 30 MG/ML
15 SYRINGE (ML) INJECTION ONCE
Refills: 0 | Status: DISCONTINUED | OUTPATIENT
Start: 2022-08-29 | End: 2022-08-29

## 2022-08-29 RX ADMIN — Medication 15 MILLIGRAM(S): at 06:34

## 2022-08-29 RX ADMIN — Medication 1000 MILLIGRAM(S): at 22:24

## 2022-08-29 RX ADMIN — Medication 1000 MILLIGRAM(S): at 00:18

## 2022-08-29 RX ADMIN — Medication 212.5 MICROGRAM(S): at 05:51

## 2022-08-29 RX ADMIN — Medication 2 CAPSULE(S): at 12:47

## 2022-08-29 RX ADMIN — Medication 102 MILLIGRAM(S): at 22:12

## 2022-08-29 RX ADMIN — SUMATRIPTAN SUCCINATE 6 MILLIGRAM(S): 4 INJECTION, SOLUTION SUBCUTANEOUS at 01:20

## 2022-08-29 RX ADMIN — Medication 1000 MILLIGRAM(S): at 14:23

## 2022-08-29 RX ADMIN — OXYCODONE HYDROCHLORIDE 5 MILLIGRAM(S): 5 TABLET ORAL at 02:43

## 2022-08-29 RX ADMIN — Medication 100 GRAM(S): at 18:47

## 2022-08-29 RX ADMIN — Medication 2 CAPSULE(S): at 19:40

## 2022-08-29 RX ADMIN — Medication 10 MILLIGRAM(S): at 00:42

## 2022-08-29 RX ADMIN — AMLODIPINE BESYLATE 10 MILLIGRAM(S): 2.5 TABLET ORAL at 05:52

## 2022-08-29 RX ADMIN — SUMATRIPTAN SUCCINATE 6 MILLIGRAM(S): 4 INJECTION, SOLUTION SUBCUTANEOUS at 01:50

## 2022-08-29 RX ADMIN — Medication 100 GRAM(S): at 05:53

## 2022-08-29 RX ADMIN — Medication 1000 MILLIGRAM(S): at 05:52

## 2022-08-29 RX ADMIN — OXYCODONE HYDROCHLORIDE 5 MILLIGRAM(S): 5 TABLET ORAL at 03:13

## 2022-08-29 RX ADMIN — LOSARTAN POTASSIUM 100 MILLIGRAM(S): 100 TABLET, FILM COATED ORAL at 05:52

## 2022-08-29 RX ADMIN — Medication 15 MILLIGRAM(S): at 07:04

## 2022-08-29 RX ADMIN — Medication 1000 MILLIGRAM(S): at 06:20

## 2022-08-29 RX ADMIN — Medication 2 CAPSULE(S): at 11:47

## 2022-08-29 RX ADMIN — PANTOPRAZOLE SODIUM 40 MILLIGRAM(S): 20 TABLET, DELAYED RELEASE ORAL at 05:52

## 2022-08-29 RX ADMIN — ENOXAPARIN SODIUM 40 MILLIGRAM(S): 100 INJECTION SUBCUTANEOUS at 05:53

## 2022-08-29 RX ADMIN — Medication 25 MILLIGRAM(S): at 05:52

## 2022-08-29 RX ADMIN — SODIUM CHLORIDE 100 MILLILITER(S): 9 INJECTION, SOLUTION INTRAVENOUS at 19:02

## 2022-08-29 RX ADMIN — Medication 102 MILLIGRAM(S): at 10:39

## 2022-08-29 RX ADMIN — Medication 145 MILLIGRAM(S): at 14:22

## 2022-08-29 RX ADMIN — Medication 1 TABLET(S): at 01:20

## 2022-08-29 RX ADMIN — Medication 1 TABLET(S): at 14:22

## 2022-08-29 NOTE — CHART NOTE - NSCHARTNOTEFT_GEN_A_CORE
Pt complaining of B/L "headache" around the temporal and periorbital region as well as photosensitivity. Head CT performed earlier was negative.   Pt received pain medication given earlier, which provided no relief.   Pt now receiving dose of reglan, excedrin & imitrex (pharmacy called, no known drug interactions), will reassess.  May need neuro consult in AM.  Discussed w/ medicine team & Dr. Bernal

## 2022-08-29 NOTE — CONSULT NOTE ADULT - ASSESSMENT
[facility]  Chart Reviewed-Full Consult to follow for any immediate concerns please fell free to contact us directly at  [phone] and have us paged or text my cell # [phone]  Albin Luna MD PhD   82 year old male with PMH UC, HTN, hypothyroid, HLD, asthma, chronic back pain, anxiety, GERD, hiatal hernia, s/p bilateral THR, s/p multiple ventral hernia repairs (1990, Dr. Goldberg, unsure if mesh was used), s/p bilateral knee arthroscopies, s/p L5-S1 laminectomy/fusion (2017), adm with 2 day history of worsening 7/10 abdominal pain which began in the central lower abdomen and migrating to the RLQ. Found to have acute appendicitis     Acute appendicitis with localized peritonitis 28-Aug-2022 22:10:51  Andrew Bernal.  ·  PROCEDURES:  Laparoscopic appendectomy 28-Aug-2022 22:09:58  Andrew Bernal.    RECOMMENDATIONS  1- appendicitis - sp surgery and noted localized peritonitis, no sig leukocytosis, afebrile, anticipate limited abx required and as pt improves can dc on vantin 200mg PO BID with last day 9/2    2-Headache/hypertension - normally well controlled in the 120s systolic so suspect headache related to elevated BP and should improve with BP decrease, noted MRI/MRA    Thank you for consulting us and involving us in the management of this most interesting and challenging case.  We will follow along in the care of this patient. Please call us at [phone] or text me directly on my cell# at [phone] with any concerns.

## 2022-08-29 NOTE — PROGRESS NOTE ADULT - SUBJECTIVE AND OBJECTIVE BOX
neuro cons dict  seen for right sided HA  Neuro exam. non focal  no meningeal signs   decadron 10 mg ivp/1  fioricet prn if decadron not helping ha  brain mri-mra

## 2022-08-29 NOTE — PROGRESS NOTE ADULT - ASSESSMENT
81yo M with PMH UC, HTN, hypothyroid, HLD, asthma, chronic back pain, anxiety, GERD, hiatal hernia, generalized arthritis, s/p bilateral total hip replacement, s/p multiple ventral hernia repairs (1990, Dr. Goldberg, unsure if mesh was used), s/p bilateral knee arthroscopies, s/p L5-S1 laminectomy/fusion (2017), presents with 2 day history of worsening 7/10 abdominal pain, admitted for acute appendicitis.

## 2022-08-29 NOTE — PROGRESS NOTE ADULT - PROBLEM SELECTOR PLAN 1
New onset head pain - neuro consulted, awaiting recs, CT head non-contrast negative  Pain control  OOB/IS  c/w CLD  Daily AM labs/repletions New onset head pain - neuro consulted, awaiting recs, CT head non-contrast negative  Pain control  OOB/IS  Advance to full liquid diet today  Daily AM labs/repletions  F/u ID for abx

## 2022-08-29 NOTE — DISCHARGE NOTE PROVIDER - NSDCMRMEDTOKEN_GEN_ALL_CORE_FT
amLODIPine 10 mg oral tablet: 1 tab(s) orally once a day  fenofibrate 145 mg oral tablet: 1 tab(s) orally once a day  levothyroxine 200 mcg (0.2 mg) oral tablet: 1 tab(s) orally once a day takes with a 0.5 tab of 25mcg for a TDD: 212.5mcg  levothyroxine 25 mcg (0.025 mg) oral tablet: 0.5 tab(s) orally once a day takes along with 200mcg for a TDD: 212.5 mcg   losartan-hydrochlorothiazide 100 mg-25 mg oral tablet: 1 tab(s) orally once a day  mercaptopurine 50 mg oral tablet: 2 tab(s) orally once a day  mesalamine 1000 mg rectal suppository: 1 suppository(ies) rectal once a day (at bedtime)  omeprazole 40 mg oral delayed release capsule: 1 cap(s) orally once a day  Percocet 5/325 oral tablet: 1 tab(s) orally every 6 hours, As Needed  Proventil HFA 90 mcg/inh inhalation aerosol: 2 pad(s) inhaled every 4 hours, As Needed  temazepam 15 mg oral capsule: 1 cap(s) orally once a day (at bedtime), As Needed  traZODone 100 mg oral tablet: 1 tab(s) orally once a day (at bedtime), As Needed  Vitamin C 1000 mg oral tablet: 1 tab(s) orally once a day  Vitamin D3:    amLODIPine 10 mg oral tablet: 1 tab(s) orally once a day  fenofibrate 145 mg oral tablet: 1 tab(s) orally once a day  Fioricet oral capsule: 1 cap(s) orally every 4 hours, As Needed  no more than 4000 mg of tylenol  levothyroxine 200 mcg (0.2 mg) oral tablet: 1 tab(s) orally once a day takes with a 0.5 tab of 25mcg for a TDD: 212.5mcg  levothyroxine 25 mcg (0.025 mg) oral tablet: 0.5 tab(s) orally once a day takes along with 200mcg for a TDD: 212.5 mcg   losartan-hydrochlorothiazide 100 mg-25 mg oral tablet: 1 tab(s) orally once a day  mercaptopurine 50 mg oral tablet: 2 tab(s) orally once a day  mesalamine 1000 mg rectal suppository: 1 suppository(ies) rectal once a day (at bedtime)  omeprazole 40 mg oral delayed release capsule: 1 cap(s) orally once a day  Proventil HFA 90 mcg/inh inhalation aerosol: 2 pad(s) inhaled every 4 hours, As Needed  temazepam 15 mg oral capsule: 1 cap(s) orally once a day (at bedtime), As Needed  traZODone 100 mg oral tablet: 1 tab(s) orally once a day (at bedtime), As Needed  Vitamin C 1000 mg oral tablet: 1 tab(s) orally once a day  Vitamin D3:

## 2022-08-29 NOTE — PROGRESS NOTE ADULT - SUBJECTIVE AND OBJECTIVE BOX
SURGERY RESIDENTNOTE ON BEHALF OF DR. PIERCE / GENERAL SURGERY:    S: Patient reports head pain that was initially 4/10 upon waking this morning, that has now increased to 10/10. Denies previous episodes of this pain. Reports pain is located lori-orbitally and temporally. Patient endorses flatus/BM. Denies n/v/sob/cp. Patient seen and examined at bedside.       MEDICATIONS:  acetaminophen     Tablet .. 1000 milliGRAM(s) Oral every 6 hours  acetaminophen 250 mG/aspirin 250 mG/caffeine 65 mG 1 Tablet(s) Oral every 6 hours PRN  ALBUTerol    90 MICROgram(s) HFA Inhaler 2 Puff(s) Inhalation every 6 hours PRN  amLODIPine   Tablet 10 milliGRAM(s) Oral daily  artificial  tears Solution 1 Drop(s) Left EYE five times a day PRN  cefoTEtan  IVPB 2 Gram(s) IV Intermittent every 12 hours  diphenhydrAMINE 25 milliGRAM(s) Oral every 4 hours PRN  enoxaparin Injectable 40 milliGRAM(s) SubCutaneous every 24 hours  fenofibrate Tablet 145 milliGRAM(s) Oral daily  hydrochlorothiazide 25 milliGRAM(s) Oral daily  HYDROmorphone  Injectable 0.5 milliGRAM(s) IV Push every 4 hours PRN  lactated ringers. 1000 milliLiter(s) IV Continuous <Continuous>  levothyroxine 212.5 MICROGram(s) Oral daily  losartan 100 milliGRAM(s) Oral daily  mesalamine Suppository 1000 milliGRAM(s) Rectal at bedtime  multivitamin 1 Tablet(s) Oral daily  ondansetron Injectable 4 milliGRAM(s) IV Push every 6 hours PRN  oxyCODONE    IR 5 milliGRAM(s) Oral every 4 hours PRN  pantoprazole    Tablet 40 milliGRAM(s) Oral before breakfast  senna 2 Tablet(s) Oral at bedtime  temazepam 15 milliGRAM(s) Oral at bedtime PRN  traZODone 100 milliGRAM(s) Oral at bedtime PRN      O:  Vital Signs Last 24 Hrs  T(C): 37.1 (29 Aug 2022 03:06), Max: 37.1 (29 Aug 2022 00:47)  T(F): 98.7 (29 Aug 2022 03:06), Max: 98.7 (29 Aug 2022 00:47)  HR: 85 (29 Aug 2022 07:03) (64 - 85)  BP: 180/80 (29 Aug 2022 07:03) (123/58 - 180/80)  BP(mean): --  RR: 19 (29 Aug 2022 03:06) (16 - 20)  SpO2: 92% (29 Aug 2022 03:06) (92% - 100%)    Parameters below as of 29 Aug 2022 03:06  Patient On (Oxygen Delivery Method): room air    I&O SUMMARY:    08-28-22 @ 07:01  -  08-29-22 @ 07:00  --------------------------------------------------------  IN: 1875 mL / OUT: 1250 mL / NET: 625 mL    PHYSICAL EXAM:  HEENT: Pinpoint pupils, nonreactive to light  Lungs: Normal work of breathing on RA  Card: S1S2  Abd: Soft, NT, ND.  +BS x 4.  No rebound/guarding. Wound incisions c/d/i   Ext: Calves soft, NT, without edema bilat    LABS:                        11.2   5.58  )-----------( 390      ( 29 Aug 2022 06:47 )             32.0     08-29    138  |  102  |  13  ----------------------------<  106<H>  3.5   |  27  |  1.50<H>    Ca    8.6      29 Aug 2022 06:47  Phos  2.6     08-29  Mg     2.2     08-29    TPro  8.1  /  Alb  4.2  /  TBili  1.1  /  DBili  x   /  AST  14<L>  /  ALT  17  /  AlkPhos  63  08-27    PT/INR - ( 28 Aug 2022 03:15 )   PT: 13.3 sec;   INR: 1.14 ratio         PTT - ( 28 Aug 2022 03:15 )  PTT:31.1 sec

## 2022-08-29 NOTE — PROGRESS NOTE ADULT - SUBJECTIVE AND OBJECTIVE BOX
Patient seen and examined at bedside. His headache is improved. Was previoiusly 9/10 in severity and sharp, currently 4/10 in severity, improved with sleep and rest.   Denies headaches, nausea, vomiting, chest pain, SOB, palpitations, abdominal pain, constipation, diarrhea, melena, hematochezia, dysuria. He is passing gas.     T(C): 36.7 (08-29-22 @ 13:26), Max: 37.1 (08-29-22 @ 00:47)  HR: 72 (08-29-22 @ 13:26) (64 - 85)  BP: 163/73 (08-29-22 @ 13:26) (123/64 - 180/80)  RR: 18 (08-29-22 @ 13:26) (16 - 19)  SpO2: 93% (08-29-22 @ 13:26) (92% - 94%)  Wt(kg): --    Physical Exam:   GENERAL: well-groomed, well-developed, NAD  HEENT: head NC/AT; EOM intact, PERRLA, conjunctiva & sclera clear; hearing grossly intact, moist mucous membranes  NECK: supple, no JVD  RESPIRATORY: CTA B/L, no wheezing, rales, rhonchi or rubs  CARDIOVASCULAR: S1&S2, RRR, no murmurs or gallops  ABDOMEN: soft, non-tender, non-distended, + Bowel sounds x4 quadrants, no guarding, rebound or rigidity. abdmonial incision sites clean and dry  MUSCULOSKELETAL:  no clubbing, cyanosis or edema of all 4 extremities  LYMPH: no cervical lymphadenopathy  VASCULAR: Radial pulses 2+ bilaterally, no varicose veins   SKIN: warm and dry, color normal  NEUROLOGIC: AA&O X3, CN2-12 intact w/ no focal deficits, no sensory loss, motor Strength 5/5 in UE & LE B/L  Psych: Normal mood and affect, normal behavior

## 2022-08-29 NOTE — PROGRESS NOTE ADULT - ASSESSMENT
82yMale patient S/P lap appendectomy 82yMale patient S/P lap appendectomy      POD #1  Mr. Victoria personally seen and examined during AM rounds.  Issues with headache noted and Neuro consult appreciated.  Reports good abdominal pain and no GI complaints.  Vitals non-suggestive.  Wounds clean and abdomen soft with appropriate incisional tenderness.  Labs reassuring from today.  Clinically, improving overall.  Surgically, stable for present.  To continue current supportive care with ongoing IV ABX due to localized peritonitis.  Will advance to full liquids.  Reviewed with patient in detail, Surgery PA and today's RN team.

## 2022-08-29 NOTE — CONSULT NOTE ADULT - SUBJECTIVE AND OBJECTIVE BOX
[facility] DIVISION of INFECTIOUS DISEASE  Albin Luna MD PhD, Nemo Roland MD, Beth Priest MD, Ronnie Pak MD, Adria Rizzo MD  and providing coverage with Trevor Roman MD  Providing Infectious Disease Consultations at [facility], [facility], [place], [place], [facility], [facility]    Office# [phone] to schedule follow up appointments  Answering Service for urgent calls or New Consults [phone]  Cell# to text for urgent issues Albin Luna [phone]     HPI:  82 year old male with PMH UC, HTN, hypothyroid, HLD, asthma, chronic back pain, anxiety, GERD, hiatal hernia, s/p bilateral THR, s/p multiple ventral hernia repairs (, Dr. Goldberg, unsure if mesh was used), s/p bilateral knee arthroscopies, s/p L5-S1 laminectomy/fusion (), adm with 2 day history of worsening 7/10 abdominal pain which began in the central lower abdomen and migrating to the RLQ. Found to have acute appendicitis     Acute appendicitis with localized peritonitis 28-Aug-2022 22:10:51  Andrew Bernal.  ·  PROCEDURES:  Laparoscopic appendectomy 28-Aug-2022 22:09:58  Andrew Bernal      PAST MEDICAL & SURGICAL HISTORY:  HTN (hypertension)  Hypercholesteremia  Reflux esophagitis  Hypothyroid  Arthritis  generalized  Back pain      Hiatal hernia      Gout  - Right big toe, last attack in       Degenerative disc disease, lumbar  and cervical      Mild intermittent asthma without complication  last inhaler use in       Depression      Ulcerative colitis      Elective surgery  laminectomy with fusion L5-S1,        Elective surgery  left shoulder       Elective surgery  hernia repair  - umbilical,        Elective surgery  bilat knee arthroscopy,       Elective surgery  bilat cataract,       H/O hernia repair  ventral, open,       History of total hip replacement, right  18      History of total hip replacement, left  3/2019          Antimicrobials  cefoTEtan  IVPB 2 Gram(s) IV Intermittent every 12 hours      Immunological      Other  acetaminophen     Tablet .. 1000 milliGRAM(s) Oral every 6 hours  acetaminophen 300 mG/butalbital 50 mG/ caffeine 40 mG 2 Capsule(s) Oral every 8 hours PRN  ALBUTerol    90 MICROgram(s) HFA Inhaler 2 Puff(s) Inhalation every 6 hours PRN  amLODIPine   Tablet 10 milliGRAM(s) Oral daily  artificial  tears Solution 1 Drop(s) Left EYE five times a day PRN  diphenhydrAMINE 25 milliGRAM(s) Oral every 4 hours PRN  enoxaparin Injectable 40 milliGRAM(s) SubCutaneous every 24 hours  fenofibrate Tablet 145 milliGRAM(s) Oral daily  hydrochlorothiazide 25 milliGRAM(s) Oral daily  HYDROmorphone  Injectable 0.5 milliGRAM(s) IV Push every 4 hours PRN  lactated ringers. 1000 milliLiter(s) IV Continuous <Continuous>  levothyroxine 212.5 MICROGram(s) Oral daily  losartan 100 milliGRAM(s) Oral daily  mesalamine Suppository 1000 milliGRAM(s) Rectal at bedtime  multivitamin 1 Tablet(s) Oral daily  ondansetron Injectable 4 milliGRAM(s) IV Push every 6 hours PRN  oxyCODONE    IR 5 milliGRAM(s) Oral every 4 hours PRN  pantoprazole    Tablet 40 milliGRAM(s) Oral before breakfast  senna 2 Tablet(s) Oral at bedtime  temazepam 15 milliGRAM(s) Oral at bedtime PRN  traZODone 100 milliGRAM(s) Oral at bedtime PRN      Allergies    penicillin (Hives; Urticaria)  shellfish (Rash; Anaphylaxis; Hives; Short breath)    Intolerances    SOCIAL HISTORY:  Nonsmoker. No ETOH or illicit drug use. (28 Aug 2022 02:45)      FAMILY HISTORY:      ROS:    EYES:  Negative  blurry vision or double vision  GASTROINTESTINAL:  Negative for nausea, vomiting, diarrhea  -otherwise negative except for subjective    Vital Signs Last 24 Hrs  T(C): 36.7 (29 Aug 2022 13:26), Max: 37.1 (29 Aug 2022 00:47)  T(F): 98 (29 Aug 2022 13:26), Max: 98.7 (29 Aug 2022 00:47)  HR: 72 (29 Aug 2022 13:26) (64 - 85)  BP: 163/73 (29 Aug 2022 13:26) (123/64 - 180/80)  BP(mean): --  RR: 18 (29 Aug 2022 13:26) (16 - 19)  SpO2: 93% (29 Aug 2022 13:26) (92% - 94%)    Parameters below as of 29 Aug 2022 13:26  Patient On (Oxygen Delivery Method): room air        PE:  WDWN in no distress  HEENT:  NC, PERRL, sclerae anicteric, conjunctivae clear, EOMI.  Sinuses nontender, no nasal exudate.  No buccal or pharyngeal lesions, erythema or exudate  Neck:  Supple, no adenopathy  Lungs:  No accessory muscle use, bilaterally clear to auscultation  Cor:  distant  Abd:  Symmetric, normoactive BS.  Soft, not very tender, no masses, guarding or rebound.  Liver and spleen not enlarged, lap sites  Extrem:  No cyanosis or edema  Skin:  No rashes.  Neuro: grossly intact  Musc: moving all limbs freely, no focal deficits        LABS:                        11.2   5.58  )-----------( 390      ( 29 Aug 2022 06:47 )             32.0       WBC Count: 5.58 K/uL (22 @ 06:47)  WBC Count: 9.41 K/uL (22 @ 22:30)          138  |  102  |  13  ----------------------------<  106<H>  3.5   |  27  |  1.50<H>    Ca    8.6      29 Aug 2022 06:47  Phos  2.6       Mg     2.2         TPro  8.1  /  Alb  4.2  /  TBili  1.1  /  DBili  x   /  AST  14<L>  /  ALT  17  /  AlkPhos  63        Creatinine, Serum: 1.50 mg/dL (22 @ 06:47)  Creatinine, Serum: 1.00 mg/dL (22 @ 22:30)      Urinalysis Basic - ( 27 Aug 2022 23:27 )    Color: Yellow / Appearance: Clear / S.020 / pH: x  Gluc: x / Ketone: Negative  / Bili: Negative / Urobili: 1   Blood: x / Protein: 15 / Nitrite: Negative   Leuk Esterase: Negative / RBC: 0-2 /HPF / WBC 0-2   Sq Epi: x / Non Sq Epi: Negative / Bacteria: Negative      MICROBIOLOGY:      RADIOLOGY & ADDITIONAL STUDIES:    --< from: CT Abdomen and Pelvis w/ IV Cont (22 @ 00:53) >    ACC: [id number] EXAM:  CT ABDOMEN AND PELVIS IC                          PROCEDURE DATE:  2022          INTERPRETATION:  CLINICAL INFORMATION: Right lower quadrant abdominal   pain.    COMPARISON: None.    CONTRAST/COMPLICATIONS:  IV Contrast: Omnipaque 350  90 cc administered   10 cc discarded  Oral Contrast: NONE  Complications: None reported at time of study completion    PROCEDURE:  CT of the Abdomen and Pelvis was performed.  Sagittal and coronal reformats were performed.    FINDINGS:  LOWER CHEST: Coronary artery calcification.    LIVER: Within normal limits.  BILE DUCTS: Normal caliber.  GALLBLADDER: Within normal limits.  SPLEEN: Within normal limits.  PANCREAS: Within normal limits.  ADRENALS: Within normal limits.  KIDNEYS/URETERS: Low-attenuation lesion within left kidney too small to   accurately characterize. No hydronephrosis. Nonobstructing left lower   pole renal calculus.    BLADDER: Within normal limits.  REPRODUCTIVE ORGANS: Obscured by streak artifact.    BOWEL: No bowel obstruction. Extensive colonic diverticulosis without CT   evidence of acute diverticulitis. Appendix is thickened and hyperemic   with associated periappendiceal inflammation and small fluid. No discrete   extraluminal gas or drainable fluidcollection.  PERITONEUM: No ascites.  VESSELS: Atherosclerotic changes.  RETROPERITONEUM/LYMPH NODES: No lymphadenopathy.  ABDOMINAL WALL: Within normal limits.  BONES: Status post bilateral hip arthroplasties. L5-S1 posterior lumbar   fusion.    IMPRESSION:  Acute appendicitis. No extraluminal gas or drainable fluid collection.    < from: MR Head No Cont (22 @ 13:12) >    ACC: [id number] EXAM:  MR ANGIO BRAIN                        ACC: [id number] EXAM:  MR BRAIN                          PROCEDURE DATE:  2022          INTERPRETATION:  .    CLINICAL INFORMATION: Right-sided severe headache. Evaluate for aneurysm.   Evaluate for cerebrovascular accident.    TECHNIQUE: Multiplanar multi sequential MRI examination of the brain was   performed without the administration of IV gadolinium. MRA images through   the [place] were obtained using a combination of2-D and 3-D   time-of-flight acquisition. The data was then reformatted into a   volumetric data set and reviewed as rotational MIP images.    COMPARISON: There are no comparison MRIs. Comparison is made with prior   head CT study dated 2022.    FINDINGS:    MRI Brain: A few small rounded nonspecific foci of T2/FLAIR   hyperintensity are noted throughout the deep and periventricular white   matter of the cerebral hemispheres. There is no associated mass effect.   There is no evidence of acute ischemia on the diffusion-weighted images.    There is diffuse cerebral volume loss with prominence of the sulci,   fissures, and cisternal spaces which is normal for the patient's age.   Ventricular size and configuration is unremarkable. Flow-voids are noted   throughout the major intracranial vessels, on the T2 weighted images,   consistent with their patency. The sellar region and posterior fossa   appear unremarkable.    The paranasal sinuses and mastoid air cells are clear. Calvarial signal   is within normal limits. There is evidence of bilateral cataract removal.    MRA [place]: The bilateral intracranial internal carotid,   anterior, and middle cerebral arteries appear unremarkable.    The anterior and bilateral posterior communicating arteries are notable.    The bilateral intradural vertebral arteries, vertebrobasilar junction,   basilar artery, and basilar tip appear unremarkable as well as the   bilateral posterior cerebral arteries.    IMPRESSION:    MRI BRAIN: No acute intracranial hemorrhage or evidence of acute ischemia.    A few small rounded nonspecific abnormal white matter foci of T2/FLAIR   prolongation statistically favoring microvascular disease.    MRA HEAD: Unremarkable study.

## 2022-08-29 NOTE — CHART NOTE - NSCHARTNOTEFT_GEN_A_CORE
Received a phone call from the RN, pt is once again complaining of a severe headache.  Yesterday's head CT, head MRI & MRA all negative. Dr. Shane consulted this morning.  Pt received his PRN Fioricet, reports it did not provide any relief.    Dr. Shane called this evening. Ordering another dose of Decadron as well as PRN 2mg Morphine to manage his pain.  Will continue to monitor.    Vital Signs Last 24 Hrs  T(C): 36.5 (29 Aug 2022 19:31), Max: 37.1 (29 Aug 2022 00:47)  T(F): 97.7 (29 Aug 2022 19:31), Max: 98.7 (29 Aug 2022 00:47)  HR: 82 (29 Aug 2022 19:31) (64 - 85)  BP: 160/64 (29 Aug 2022 19:55) (123/64 - 180/80)  BP(mean): --  RR: 18 (29 Aug 2022 19:31) (18 - 19)  SpO2: 96% (29 Aug 2022 19:31) (92% - 96%)    Parameters below as of 29 Aug 2022 19:31  Patient On (Oxygen Delivery Method): room air Received a phone call from the RN, pt is once again complaining of a severe headache.  Yesterday's head CT, head MRI & MRA all negative. Dr. Shane consulted this morning.  Pt received his PRN Fioricet, reports it did not provide any relief.    Dr. Shane called this evening. Ordering another dose of Decadron as well as PRN 2mg Morphine to manage his pain (per Neuro recommendations).  Will continue to monitor.    Vital Signs Last 24 Hrs  T(C): 36.5 (29 Aug 2022 19:31), Max: 37.1 (29 Aug 2022 00:47)  T(F): 97.7 (29 Aug 2022 19:31), Max: 98.7 (29 Aug 2022 00:47)  HR: 82 (29 Aug 2022 19:31) (64 - 85)  BP: 160/64 (29 Aug 2022 19:55) (123/64 - 180/80)  BP(mean): --  RR: 18 (29 Aug 2022 19:31) (18 - 19)  SpO2: 96% (29 Aug 2022 19:31) (92% - 96%)    Parameters below as of 29 Aug 2022 19:31  Patient On (Oxygen Delivery Method): room air

## 2022-08-29 NOTE — DISCHARGE NOTE PROVIDER - HOSPITAL COURSE
HPI: 82 year old male with PMH UC, HTN, hypothyroid, HLD, asthma, chronic back pain, anxiety, GERD, hiatal hernia, s/p bilateral THR, s/p multiple ventral hernia repairs (1990, Dr. Goldberg, unsure if mesh was used), s/p bilateral knee arthroscopies, s/p L5-S1 laminectomy/fusion (2017), presents with 2 day history of worsening 7/10 abdominal pain which began in the central lower abdomen and migrating to the RLQ. Patient was doing work on his pool today when he noticed pain had gotten so severe it became difficult to ambulate or bend down. He tried taking suellen seltzer without relief. Last BM this afternoon was normal. He also endorses chills, but denies fever. No chest pain, shortness of breath, nausea, vomiting, melena or hematochezia.     Hospital Course:  Patient underwent successful laparoscopic appendectomy, tolerated it well and was transferred to the PACU then to the floor for monitoring.   Once hemodynamically stable, pt was able to ambulate with assistance.  Medicine consulted for medical co-management/optimization. ID was also consulted for abx management.    On POD #0, pt begin to complain of a severe headache - b/l around temples & periorbital region, accompanied w/ photosensitivity. Discussed w/ medicine & surgical attending, urgent CT head ordered & resulted negative. Overnight, his pain persisted despite offering & administering pain medication & Excedrin. As the morning approached, per the RN, pt slept intermittently but once again began to c/o this headache. Neurology was consulted that morning, MR head & MRA were ordered as well as a dose Decadron & Fioricet. That evening, pt began to c/o of pain once again, neurology called - reviewed results of imaging, all negative. Ordered an additional dose of Decadron as well as prn Morphine to manage his pain.    Throughout hospital stay, patient was continued on antibiotics, pain was managed adequately.   Diet was advanced appropriately until return of normal GI function was obtained as evidence by passing of flatus and BM in addition to toleration of diet.  Lab values were monitored with resolution of elevated Wc, electrolytes were repleted as indicated.    Dispo: discharge home, to follow up with Dr. Bernal outpatient. Also recommending Neurology f/u outpt.

## 2022-08-29 NOTE — DISCHARGE NOTE PROVIDER - CARE PROVIDER_API CALL
Andrew Bernal)  Surgery  [address], [place] [id number]  Phone: [phone]  Fax: [phone]  Follow Up Time:     Thaddeus Shane  NEUROLOGY  [address] [place] [address]  Phone: [phone]  Fax: [phone]  Follow Up Time:

## 2022-08-29 NOTE — DISCHARGE NOTE PROVIDER - PROVIDER RX CONTACT NUMBER
Patient's dobutamine stress echo non-diagnostic related to low heart rate.  Dr. Rodrigues reviewed and is ordering a Lexiscan.  Date: 1/3/2017    Time of Call: 1:19 PM     Diagnosis:  Cardiac evaluation prior to kidney transplant.     [ VORB ] Ordering provider: Dr. Rodrigues  Order: Lexiscan     Order received by: Ramos YOUNGER RN     Follow-up/additional notes: Patient notified and is agreeable to plan.  Message sent to scheduling to call patient and help schedule.       [phone]

## 2022-08-30 LAB
ALBUMIN SERPL ELPH-MCNC: 3 G/DL — LOW (ref 3.3–5)
ALP SERPL-CCNC: 48 U/L — SIGNIFICANT CHANGE UP (ref 40–120)
ALT FLD-CCNC: 17 U/L — SIGNIFICANT CHANGE UP (ref 12–78)
ANION GAP SERPL CALC-SCNC: 7 MMOL/L — SIGNIFICANT CHANGE UP (ref 5–17)
AST SERPL-CCNC: 17 U/L — SIGNIFICANT CHANGE UP (ref 15–37)
BASOPHILS # BLD AUTO: 0 K/UL — SIGNIFICANT CHANGE UP (ref 0–0.2)
BASOPHILS NFR BLD AUTO: 0 % — SIGNIFICANT CHANGE UP (ref 0–2)
BILIRUB SERPL-MCNC: 0.5 MG/DL — SIGNIFICANT CHANGE UP (ref 0.2–1.2)
BUN SERPL-MCNC: 10 MG/DL — SIGNIFICANT CHANGE UP (ref 7–23)
CALCIUM SERPL-MCNC: 9.2 MG/DL — SIGNIFICANT CHANGE UP (ref 8.5–10.1)
CHLORIDE SERPL-SCNC: 102 MMOL/L — SIGNIFICANT CHANGE UP (ref 96–108)
CO2 SERPL-SCNC: 28 MMOL/L — SIGNIFICANT CHANGE UP (ref 22–31)
CREAT SERPL-MCNC: 0.73 MG/DL — SIGNIFICANT CHANGE UP (ref 0.5–1.3)
EGFR: 92 ML/MIN/1.73M2 — SIGNIFICANT CHANGE UP
EOSINOPHIL # BLD AUTO: 0 K/UL — SIGNIFICANT CHANGE UP (ref 0–0.5)
EOSINOPHIL NFR BLD AUTO: 0 % — SIGNIFICANT CHANGE UP (ref 0–6)
GLUCOSE SERPL-MCNC: 122 MG/DL — HIGH (ref 70–99)
HCT VFR BLD CALC: 31.4 % — LOW (ref 39–50)
HGB BLD-MCNC: 11 G/DL — LOW (ref 13–17)
IMM GRANULOCYTES NFR BLD AUTO: 0.4 % — SIGNIFICANT CHANGE UP (ref 0–1.5)
LYMPHOCYTES # BLD AUTO: 0.16 K/UL — LOW (ref 1–3.3)
LYMPHOCYTES # BLD AUTO: 3.4 % — LOW (ref 13–44)
MAGNESIUM SERPL-MCNC: 1.8 MG/DL — SIGNIFICANT CHANGE UP (ref 1.6–2.6)
MCHC RBC-ENTMCNC: 35 GM/DL — SIGNIFICANT CHANGE UP (ref 32–36)
MCHC RBC-ENTMCNC: 35 PG — HIGH (ref 27–34)
MCV RBC AUTO: 100 FL — SIGNIFICANT CHANGE UP (ref 80–100)
MONOCYTES # BLD AUTO: 0.23 K/UL — SIGNIFICANT CHANGE UP (ref 0–0.9)
MONOCYTES NFR BLD AUTO: 4.8 % — SIGNIFICANT CHANGE UP (ref 2–14)
NEUTROPHILS # BLD AUTO: 4.35 K/UL — SIGNIFICANT CHANGE UP (ref 1.8–7.4)
NEUTROPHILS NFR BLD AUTO: 91.4 % — HIGH (ref 43–77)
NRBC # BLD: 0 /100 WBCS — SIGNIFICANT CHANGE UP (ref 0–0)
PHOSPHATE SERPL-MCNC: 1.9 MG/DL — LOW (ref 2.5–4.5)
PLATELET # BLD AUTO: 439 K/UL — HIGH (ref 150–400)
POTASSIUM SERPL-MCNC: 3.8 MMOL/L — SIGNIFICANT CHANGE UP (ref 3.5–5.3)
POTASSIUM SERPL-SCNC: 3.8 MMOL/L — SIGNIFICANT CHANGE UP (ref 3.5–5.3)
PROT SERPL-MCNC: 6.6 G/DL — SIGNIFICANT CHANGE UP (ref 6–8.3)
RBC # BLD: 3.14 M/UL — LOW (ref 4.2–5.8)
RBC # FLD: 14.6 % — HIGH (ref 10.3–14.5)
SODIUM SERPL-SCNC: 137 MMOL/L — SIGNIFICANT CHANGE UP (ref 135–145)
SURGICAL PATHOLOGY STUDY: SIGNIFICANT CHANGE UP
WBC # BLD: 4.76 K/UL — SIGNIFICANT CHANGE UP (ref 3.8–10.5)
WBC # FLD AUTO: 4.76 K/UL — SIGNIFICANT CHANGE UP (ref 3.8–10.5)

## 2022-08-30 PROCEDURE — 99233 SBSQ HOSP IP/OBS HIGH 50: CPT

## 2022-08-30 PROCEDURE — 70490 CT SOFT TISSUE NECK W/O DYE: CPT | Mod: 26

## 2022-08-30 RX ORDER — CEFTRIAXONE 500 MG/1
1000 INJECTION, POWDER, FOR SOLUTION INTRAMUSCULAR; INTRAVENOUS EVERY 24 HOURS
Refills: 0 | Status: DISCONTINUED | OUTPATIENT
Start: 2022-08-30 | End: 2022-08-31

## 2022-08-30 RX ORDER — MAGNESIUM SULFATE 500 MG/ML
2 VIAL (ML) INJECTION ONCE
Refills: 0 | Status: COMPLETED | OUTPATIENT
Start: 2022-08-30 | End: 2022-08-30

## 2022-08-30 RX ORDER — MERCAPTOPURINE 50 MG/1
100 TABLET ORAL ONCE
Refills: 0 | Status: COMPLETED | OUTPATIENT
Start: 2022-08-31 | End: 2022-08-31

## 2022-08-30 RX ORDER — HYDRALAZINE HCL 50 MG
10 TABLET ORAL EVERY 8 HOURS
Refills: 0 | Status: DISCONTINUED | OUTPATIENT
Start: 2022-08-30 | End: 2022-08-31

## 2022-08-30 RX ORDER — SUMATRIPTAN SUCCINATE 4 MG/.5ML
6 INJECTION, SOLUTION SUBCUTANEOUS ONCE
Refills: 0 | Status: COMPLETED | OUTPATIENT
Start: 2022-08-30 | End: 2022-08-30

## 2022-08-30 RX ORDER — METRONIDAZOLE 500 MG
500 TABLET ORAL EVERY 8 HOURS
Refills: 0 | Status: DISCONTINUED | OUTPATIENT
Start: 2022-08-30 | End: 2022-08-31

## 2022-08-30 RX ORDER — DIPHENHYDRAMINE HCL 50 MG
25 CAPSULE ORAL EVERY 8 HOURS
Refills: 0 | Status: DISCONTINUED | OUTPATIENT
Start: 2022-08-30 | End: 2022-08-31

## 2022-08-30 RX ORDER — DEXAMETHASONE 0.5 MG/5ML
10 ELIXIR ORAL ONCE
Refills: 0 | Status: COMPLETED | OUTPATIENT
Start: 2022-08-30 | End: 2022-08-30

## 2022-08-30 RX ORDER — POTASSIUM PHOSPHATE, MONOBASIC POTASSIUM PHOSPHATE, DIBASIC 236; 224 MG/ML; MG/ML
15 INJECTION, SOLUTION INTRAVENOUS ONCE
Refills: 0 | Status: COMPLETED | OUTPATIENT
Start: 2022-08-30 | End: 2022-08-30

## 2022-08-30 RX ADMIN — SUMATRIPTAN SUCCINATE 6 MILLIGRAM(S): 4 INJECTION, SOLUTION SUBCUTANEOUS at 13:20

## 2022-08-30 RX ADMIN — Medication 2 CAPSULE(S): at 21:52

## 2022-08-30 RX ADMIN — Medication 100 MILLIGRAM(S): at 15:36

## 2022-08-30 RX ADMIN — Medication 102 MILLIGRAM(S): at 13:17

## 2022-08-30 RX ADMIN — Medication 2 CAPSULE(S): at 10:05

## 2022-08-30 RX ADMIN — Medication 100 GRAM(S): at 06:20

## 2022-08-30 RX ADMIN — TEMAZEPAM 15 MILLIGRAM(S): 15 CAPSULE ORAL at 00:10

## 2022-08-30 RX ADMIN — Medication 212.5 MICROGRAM(S): at 06:07

## 2022-08-30 RX ADMIN — Medication 1000 MILLIGRAM(S): at 06:20

## 2022-08-30 RX ADMIN — POTASSIUM PHOSPHATE, MONOBASIC POTASSIUM PHOSPHATE, DIBASIC 62.5 MILLIMOLE(S): 236; 224 INJECTION, SOLUTION INTRAVENOUS at 23:43

## 2022-08-30 RX ADMIN — Medication 145 MILLIGRAM(S): at 13:16

## 2022-08-30 RX ADMIN — ENOXAPARIN SODIUM 40 MILLIGRAM(S): 100 INJECTION SUBCUTANEOUS at 06:05

## 2022-08-30 RX ADMIN — Medication 2 CAPSULE(S): at 11:05

## 2022-08-30 RX ADMIN — MORPHINE SULFATE 2 MILLIGRAM(S): 50 CAPSULE, EXTENDED RELEASE ORAL at 07:48

## 2022-08-30 RX ADMIN — Medication 25 MILLIGRAM(S): at 06:06

## 2022-08-30 RX ADMIN — Medication 2 CAPSULE(S): at 15:35

## 2022-08-30 RX ADMIN — Medication 1 TABLET(S): at 13:16

## 2022-08-30 RX ADMIN — PANTOPRAZOLE SODIUM 40 MILLIGRAM(S): 20 TABLET, DELAYED RELEASE ORAL at 06:19

## 2022-08-30 RX ADMIN — CEFTRIAXONE 100 MILLIGRAM(S): 500 INJECTION, POWDER, FOR SOLUTION INTRAMUSCULAR; INTRAVENOUS at 10:05

## 2022-08-30 RX ADMIN — Medication 2 CAPSULE(S): at 16:48

## 2022-08-30 RX ADMIN — MORPHINE SULFATE 2 MILLIGRAM(S): 50 CAPSULE, EXTENDED RELEASE ORAL at 00:09

## 2022-08-30 RX ADMIN — Medication 2 CAPSULE(S): at 22:40

## 2022-08-30 RX ADMIN — SUMATRIPTAN SUCCINATE 6 MILLIGRAM(S): 4 INJECTION, SOLUTION SUBCUTANEOUS at 14:31

## 2022-08-30 RX ADMIN — Medication 100 MILLIGRAM(S): at 10:51

## 2022-08-30 RX ADMIN — AMLODIPINE BESYLATE 10 MILLIGRAM(S): 2.5 TABLET ORAL at 06:06

## 2022-08-30 RX ADMIN — MORPHINE SULFATE 2 MILLIGRAM(S): 50 CAPSULE, EXTENDED RELEASE ORAL at 04:01

## 2022-08-30 RX ADMIN — Medication 100 MILLIGRAM(S): at 21:51

## 2022-08-30 RX ADMIN — LOSARTAN POTASSIUM 100 MILLIGRAM(S): 100 TABLET, FILM COATED ORAL at 06:06

## 2022-08-30 RX ADMIN — Medication 25 GRAM(S): at 18:09

## 2022-08-30 NOTE — PROGRESS NOTE ADULT - NSPROGADDITIONALINFOA_GEN_ALL_CORE
Anemia: no signs or symptoms of active bleeding. Continue to monitor hgb.   Hypophosphatemia: recheck Phos in AM. Headache: mgmt per neurology  -MRI BRAIN: No acute intracranial hemorrhage or evidence of acute ischemia.  A few small rounded nonspecific abnormal white matter foci of T2/FLAIR   prolongation statistically favoring microvascular disease.  MRA HEAD: Unremarkable study.  CT neck: No evidence of cellulitis. Mild degenerative changes of the   LEFT temporomandibular joint.    Anemia: no signs or symptoms of active bleeding. Continue to monitor hgb.   Hypophosphatemia: recheck Phos in AM.

## 2022-08-30 NOTE — PROGRESS NOTE ADULT - ASSESSMENT
POD #2, S/P Laparoscopic Appendectomy with localized peritonitis and post operative migraine  Mr. Victoria personally seen and examined during my AM rounds.  Issues with migraine/ headache noted.  However, he reports no abdominal pain and no GI complaints.  Vitals non-suggestive overall though hypertension noted.  Will have team review all outpatient medications.  Wounds clean, abdomen soft with minimal appropriate incisional tenderness.  Labs reassuring from today with normalization of Cr.  CT of head, CT of Neck and MRI/ MRI all negative for source of migraine/ headache.  Clinically, Migraine/ Headache now the dominant issue.  Surgically, stable for the present.  To continue current supportive care with ongoing IV ABX due to localized peritonitis.  Will advanced to regular diet.  Reviewed with patient in detail, Neurology attending, Surgery PA and today's RN.

## 2022-08-30 NOTE — PROGRESS NOTE ADULT - SUBJECTIVE AND OBJECTIVE BOX
Patient seen and examined at bedside. His headache is improved. He continues to have intermittent headache, he had a severe headache this morning which is now improved. He finds that having a ice pack on his head improves the headache. He is tolerating PO intake. Had a bowel movement this morning, well formed.   Denies light-headedness, dizziness,, nausea, vomiting, chest pain, SOB, palpitations, abdominal pain, constipation, diarrhea, melena, hematochezia, dysuria.     T(C): 36.4 (08-30-22 @ 13:34), Max: 36.8 (08-30-22 @ 03:58)  HR: 85 (08-30-22 @ 13:34) (77 - 85)  BP: 155/79 (08-30-22 @ 13:34) (155/79 - 162/71)  RR: 18 (08-30-22 @ 13:34) (18 - 18)  SpO2: 94% (08-30-22 @ 13:34) (94% - 96%)  Wt(kg): --    Physical Exam:   GENERAL: well-groomed, well-developed, NAD  HEENT: head NC/AT; EOM intact, PERRLA, conjunctiva & sclera clear; hearing grossly intact, moist mucous membranes  NECK: supple, no JVD  RESPIRATORY: CTA B/L, no wheezing, rales, rhonchi or rubs  CARDIOVASCULAR: S1&S2, RRR, no murmurs or gallops  ABDOMEN: soft, non-tender, non-distended, + Bowel sounds x4 quadrants, no guarding, rebound or rigidity  MUSCULOSKELETAL:  no clubbing, cyanosis or edema of all 4 extremities  LYMPH: no cervical lymphadenopathy  VASCULAR: Radial pulses 2+ bilaterally, no varicose veins   SKIN: warm and dry, color normal  NEUROLOGIC: AA&O X3, CN2-12 intact w/ no focal deficits, no sensory loss, motor Strength 5/5 in UE & LE B/L  Psych: Normal mood and affect, normal behavior

## 2022-08-30 NOTE — PROGRESS NOTE ADULT - PROBLEM SELECTOR PLAN 2
BP elevated earlier in the day but has since improved.   -started on hydralazine PRN for SBP>180  -continue to monitor  -conitnue Norvasc, HCTZ, Losartan
BP stable and at goal.   -continue to monitor  -conitnue anti-htn meds

## 2022-08-30 NOTE — PROGRESS NOTE ADULT - ASSESSMENT
83yo M with PMH UC, HTN, hypothyroid, HLD, asthma, chronic back pain, anxiety, GERD, hiatal hernia, generalized arthritis, s/p bilateral total hip replacement, s/p multiple ventral hernia repairs (1990, Dr. Goldberg, unsure if mesh was used), s/p bilateral knee arthroscopies, s/p L5-S1 laminectomy/fusion (2017), presents with 2 day history of worsening 7/10 abdominal pain, admitted for acute appendicitis.

## 2022-08-30 NOTE — PROGRESS NOTE ADULT - SUBJECTIVE AND OBJECTIVE BOX
[facility] DIVISION of INFECTIOUS DISEASE  Albin Luna MD PhD, Nemo Roland MD, Beth Priest MD, Ronnie Pak MD, Adria Rizzo MD  and providing coverage with Trevor Roman MD  Providing Infectious Disease Consultations at [facility], [facility]    Office# [phone] to schedule follow up appointments  Answering Service for urgent calls or New Consults [phone]  Cell# to text for urgent issues Albin Luna [phone]     infectious diseases progress note:    GRETA HAY is a 82y y. o. Male patient    Overnight and events of the last 24hrs reviewed    Allergies    penicillin (Hives; Urticaria)  shellfish (Rash; Anaphylaxis; Hives; Short breath)    Intolerances        ANTIBIOTICS/RELEVANT:  antimicrobials  cefTRIAXone   IVPB 1000 milliGRAM(s) IV Intermittent every 24 hours  metroNIDAZOLE  IVPB 500 milliGRAM(s) IV Intermittent every 8 hours    immunologic:    OTHER:  acetaminophen     Tablet .. 1000 milliGRAM(s) Oral every 6 hours  acetaminophen 300 mG/butalbital 50 mG/ caffeine 40 mG 2 Capsule(s) Oral every 8 hours PRN  ALBUTerol    90 MICROgram(s) HFA Inhaler 2 Puff(s) Inhalation every 6 hours PRN  amLODIPine   Tablet 10 milliGRAM(s) Oral daily  artificial  tears Solution 1 Drop(s) Left EYE five times a day PRN  diphenhydrAMINE 25 milliGRAM(s) Oral every 4 hours PRN  enoxaparin Injectable 40 milliGRAM(s) SubCutaneous every 24 hours  fenofibrate Tablet 145 milliGRAM(s) Oral daily  hydrochlorothiazide 25 milliGRAM(s) Oral daily  HYDROmorphone  Injectable 0.5 milliGRAM(s) IV Push every 4 hours PRN  lactated ringers. 1000 milliLiter(s) IV Continuous <Continuous>  levothyroxine 212.5 MICROGram(s) Oral daily  losartan 100 milliGRAM(s) Oral daily  mesalamine Suppository 1000 milliGRAM(s) Rectal at bedtime  morphine  - Injectable 2 milliGRAM(s) IV Push every 3 hours PRN  multivitamin 1 Tablet(s) Oral daily  ondansetron Injectable 4 milliGRAM(s) IV Push every 6 hours PRN  oxyCODONE    IR 5 milliGRAM(s) Oral every 4 hours PRN  pantoprazole    Tablet 40 milliGRAM(s) Oral before breakfast  senna 2 Tablet(s) Oral at bedtime  temazepam 15 milliGRAM(s) Oral at bedtime PRN  traZODone 100 milliGRAM(s) Oral at bedtime PRN      Objective:  Vital Signs Last 24 Hrs  T(C): 36.8 (30 Aug 2022 03:58), Max: 36.8 (30 Aug 2022 03:58)  T(F): 98.3 (30 Aug 2022 03:58), Max: 98.3 (30 Aug 2022 03:58)  HR: 77 (30 Aug 2022 03:58) (72 - 82)  BP: 162/71 (30 Aug 2022 03:58) (160/64 - 163/73)  BP(mean): --  RR: 18 (30 Aug 2022 03:58) (18 - 18)  SpO2: 94% (30 Aug 2022 03:58) (93% - 96%)    Parameters below as of 30 Aug 2022 03:58  Patient On (Oxygen Delivery Method): room air        T(C): 36.8 (08-30-22 @ 03:58), Max: 37.1 (08-29-22 @ 00:47)  T(C): 36.8 (08-30-22 @ 03:58), Max: 37.3 (08-28-22 @ 07:57)  T(C): 36.8 (08-30-22 @ 03:58), Max: 37.3 (08-28-22 @ 07:57)    PHYSICAL EXAM: in distress, ice on head  HEENT: NC atraumatic  Neck: supple  Respiratory: no accessory muscle use, breathing comfortably  Cardiovascular: distant  Gastrointestinal: normal appearing, nondistended  Extremities: no clubbing, no cyanosis,        LABS:                          11.0   4.76  )-----------( 439      ( 30 Aug 2022 06:10 )             31.4       WBC  4.76 08-30 @ 06:10  5.58 08-29 @ 06:47  9.41 08-27 @ 22:30      08-30    137  |  102  |  10  ----------------------------<  122<H>  3.8   |  28  |  0.73    Ca    9.2      30 Aug 2022 06:10  Phos  1.9     08-30  Mg     1.8     08-30    TPro  6.6  /  Alb  3.0<L>  /  TBili  0.5  /  DBili  x   /  AST  17  /  ALT  17  /  AlkPhos  48  08-30      Creatinine, Serum: 0.73 mg/dL (08-30-22 @ 06:10)  Creatinine, Serum: 1.50 mg/dL (08-29-22 @ 06:47)  Creatinine, Serum: 1.00 mg/dL (08-27-22 @ 22:30)                INFLAMMATORY MARKERS  Auto Neutrophil #: 4.35 K/uL (08-30-22 @ 06:10)  Auto Lymphocyte #: 0.16 K/uL (08-30-22 @ 06:10)  Auto Lymphocyte #: 0.19 K/uL (08-27-22 @ 22:30)  Auto Neutrophil #: 9.03 K/uL (08-27-22 @ 22:30)    Lactate, Blood: 1.2 mmol/L (08-27-22 @ 23:25)    Auto Eosinophil #: 0.00 K/uL (08-30-22 @ 06:10)  Auto Eosinophil #: 0.00 K/uL (08-27-22 @ 22:30)                        INR: 1.14 ratio (08-28-22 @ 03:15)  Activated Partial Thromboplastin Time: 31.1 sec (08-28-22 @ 03:15)          MICROBIOLOGY:              RADIOLOGY & ADDITIONAL STUDIES:

## 2022-08-30 NOTE — PROGRESS NOTE ADULT - PROBLEM SELECTOR PLAN 1
Headache: imaging negative, f/u w/ neurology  pain control  OOB/IS  c/w abx: cefotetan  advance diet as tolerated  DC planning

## 2022-08-30 NOTE — PROGRESS NOTE ADULT - PROBLEM/PLAN-5
DISPLAY PLAN FREE TEXT
The patient is a 61y Female complaining of abdominal pain.
DISPLAY PLAN FREE TEXT

## 2022-08-30 NOTE — PROGRESS NOTE ADULT - PROBLEM SELECTOR PLAN 4
continue to monitor for diarrhea  -continue Canasa
continue to monitor for diarrhea  -continue Canasa

## 2022-08-30 NOTE — PROGRESS NOTE ADULT - SUBJECTIVE AND OBJECTIVE BOX
SURGERY RESIDENT NOTE ON BEHALF OF DR. PIERCE / GENERAL SURGERY:    S: Patient reports headache returned yesterday evening. Currently rates pain at 7/10. Reports second dose of fiorocet yesterday PM did not help as much as first dose. Denies n/v/cp/sob. Endorses flatus/BM. Tolerating full liquid diet. Patient seen and examined at bedside.       MEDICATIONS:  acetaminophen     Tablet .. 1000 milliGRAM(s) Oral every 6 hours  acetaminophen 300 mG/butalbital 50 mG/ caffeine 40 mG 2 Capsule(s) Oral every 8 hours PRN  ALBUTerol    90 MICROgram(s) HFA Inhaler 2 Puff(s) Inhalation every 6 hours PRN  amLODIPine   Tablet 10 milliGRAM(s) Oral daily  artificial  tears Solution 1 Drop(s) Left EYE five times a day PRN  cefoTEtan  IVPB 2 Gram(s) IV Intermittent every 12 hours  diphenhydrAMINE 25 milliGRAM(s) Oral every 4 hours PRN  enoxaparin Injectable 40 milliGRAM(s) SubCutaneous every 24 hours  fenofibrate Tablet 145 milliGRAM(s) Oral daily  hydrochlorothiazide 25 milliGRAM(s) Oral daily  HYDROmorphone  Injectable 0.5 milliGRAM(s) IV Push every 4 hours PRN  lactated ringers. 1000 milliLiter(s) IV Continuous <Continuous>  levothyroxine 212.5 MICROGram(s) Oral daily  losartan 100 milliGRAM(s) Oral daily  mesalamine Suppository 1000 milliGRAM(s) Rectal at bedtime  morphine  - Injectable 2 milliGRAM(s) IV Push every 3 hours PRN  multivitamin 1 Tablet(s) Oral daily  ondansetron Injectable 4 milliGRAM(s) IV Push every 6 hours PRN  oxyCODONE    IR 5 milliGRAM(s) Oral every 4 hours PRN  pantoprazole    Tablet 40 milliGRAM(s) Oral before breakfast  senna 2 Tablet(s) Oral at bedtime  temazepam 15 milliGRAM(s) Oral at bedtime PRN  traZODone 100 milliGRAM(s) Oral at bedtime PRN    O:  Vital Signs Last 24 Hrs  T(C): 36.8 (30 Aug 2022 03:58), Max: 36.8 (30 Aug 2022 03:58)  T(F): 98.3 (30 Aug 2022 03:58), Max: 98.3 (30 Aug 2022 03:58)  HR: 77 (30 Aug 2022 03:58) (72 - 82)  BP: 162/71 (30 Aug 2022 03:58) (160/64 - 163/73)  BP(mean): --  RR: 18 (30 Aug 2022 03:58) (18 - 18)  SpO2: 94% (30 Aug 2022 03:58) (93% - 96%)    Parameters below as of 30 Aug 2022 03:58  Patient On (Oxygen Delivery Method): room air    I&O SUMMARY:    08-29-22 @ 07:01  -  08-30-22 @ 07:00  --------------------------------------------------------  IN: 0 mL / OUT: 3300 mL / NET: -3300 mL    PHYSICAL EXAM:  General: NAD  HEENT: Left pupil nonreactive to light  Lungs: Normal work of breathing on RA  Card: S1S2  Abd: Soft, NT, ND.  +BS x 4.  No rebound/guarding. Surgical incisions c/d/i    Ext: Calves soft, NT, without edema bilat    LABS:                        11.0   4.76  )-----------( 439      ( 30 Aug 2022 06:10 )             31.4     08-30    137  |  102  |  10  ----------------------------<  122<H>  3.8   |  28  |  0.73    Ca    9.2      30 Aug 2022 06:10  Phos  1.9     08-30  Mg     1.8     08-30    TPro  6.6  /  Alb  3.0<L>  /  TBili  0.5  /  DBili  x   /  AST  17  /  ALT  17  /  AlkPhos  48  08-30

## 2022-08-30 NOTE — PROGRESS NOTE ADULT - SUBJECTIVE AND OBJECTIVE BOX
Neurology Follow up note    GRETA HAY82yMale    HPI:  82 year old male with PMH UC, HTN, hypothyroid, HLD, asthma, chronic back pain, anxiety, GERD, hiatal hernia, s/p bilateral THR, s/p multiple ventral hernia repairs (1990, Dr. Goldberg, unsure if mesh was used), s/p bilateral knee arthroscopies, s/p L5-S1 laminectomy/fusion (2017), presents with 2 day history of worsening 7/10 abdominal pain which began in the central lower abdomen and migrating to the RLQ. Patient was doing work on his pool today when he noticed pain had gotten so severe it became difficult to ambulate or bend down. He tried taking suellen seltzer without relief. Last BM this afternoon was normal. He also endorses chills, but denies fever. No chest pain, shortness of breath, nausea, vomiting, melena or hematochezia. (28 Aug 2022 02:45)      Interval History -still c/o right sided HA8-(9/10)    Patient is seen, chart was reviewed and case was discussed with the treatment team.  Pt is not in any distress.   Lying on bed comfortably.   No events reported overnight.       Vital Signs Last 24 Hrs  T(C): 36.4 (30 Aug 2022 13:34), Max: 36.8 (30 Aug 2022 03:58)  T(F): 97.6 (30 Aug 2022 13:34), Max: 98.3 (30 Aug 2022 03:58)  HR: 85 (30 Aug 2022 13:34) (77 - 85)  BP: 155/79 (30 Aug 2022 13:34) (155/79 - 162/71)  BP(mean): --  RR: 18 (30 Aug 2022 13:34) (18 - 18)  SpO2: 94% (30 Aug 2022 13:34) (94% - 96%)    Parameters below as of 30 Aug 2022 13:34  Patient On (Oxygen Delivery Method): room air            REVIEW OF SYSTEMS:    Constitutional: No fever,  Eyes: No eye pain, visual disturbances, or discharge  ENT:  No difficulty hearing, tinnitus, vertigo; No sinus or throat pain  Neck: No pain or stiffness  Respiratory: No cough, wheezing, chills or hemoptysis  Cardiovascular: No chest pain, palpitations, shortness of breath, dizziness or leg swelling  Gastrointestinal: No abdominal or epigastric pain.  Genitourinary: No dysuria, frequency, hematuria or incontinence  Neurological: Severe headaches +  Psychiatric: No depression, mood swings  Musculoskeletal: No joint pain or swelling;   Skin: No itching, burning, rashes or lesions   Lymph Nodes: No enlarged glands  Endocrine: No heat or cold intolerance  Allergy and Immunologic: No hives or eczema    On Neurological Examination:    Mental Status - Pt is alert, awake, oriented X3. . Follows commands well and able to answer questions appropriately.Mood and affect  normal    Speech -  Normal.     Cranial Nerves - Pupils 3 mm equal and reactive to light, extraocular eye movements intact. Pt has no visual field deficit.  Pt has no facial asymmetry. Facial sensation is intact.Tongue - is in midline.    Muscle tone - is normal all over. Moves all extremities equally. No asymmetry is seen.      Motor Exam - 5/5 all over, No drift. No shaking or tremors.    Sensory Exam - Pin prick, temperature, joint position and vibration are intact on either side. Pt withdraws all extremities equally on stimulation. No asymmetry seen. No complaints of tingling, numbness.    Gait - Able to stand and walk unassisted.        coordination:    Finger to nose: normal      Deep tendon Reflexes - 2 plus all over.          Neck Supple -  Yes.     MEDICATIONS    acetaminophen     Tablet .. 1000 milliGRAM(s) Oral every 6 hours PRN  acetaminophen 300 mG/butalbital 50 mG/ caffeine 40 mG 2 Capsule(s) Oral every 8 hours  ALBUTerol    90 MICROgram(s) HFA Inhaler 2 Puff(s) Inhalation every 6 hours PRN  amLODIPine   Tablet 10 milliGRAM(s) Oral daily  artificial  tears Solution 1 Drop(s) Left EYE five times a day PRN  cefTRIAXone   IVPB 1000 milliGRAM(s) IV Intermittent every 24 hours  diphenhydrAMINE 25 milliGRAM(s) Oral every 4 hours PRN  diphenhydrAMINE 25 milliGRAM(s) Oral every 8 hours PRN  enoxaparin Injectable 40 milliGRAM(s) SubCutaneous every 24 hours  fenofibrate Tablet 145 milliGRAM(s) Oral daily  hydrochlorothiazide 25 milliGRAM(s) Oral daily  HYDROmorphone  Injectable 0.5 milliGRAM(s) IV Push every 4 hours PRN  lactated ringers. 1000 milliLiter(s) IV Continuous <Continuous>  levothyroxine 212.5 MICROGram(s) Oral daily  losartan 100 milliGRAM(s) Oral daily  magnesium sulfate  IVPB 2 Gram(s) IV Intermittent once  mesalamine Suppository 1000 milliGRAM(s) Rectal at bedtime  metroNIDAZOLE  IVPB 500 milliGRAM(s) IV Intermittent every 8 hours  morphine  - Injectable 2 milliGRAM(s) IV Push every 3 hours PRN  multivitamin 1 Tablet(s) Oral daily  ondansetron Injectable 4 milliGRAM(s) IV Push every 6 hours PRN  oxyCODONE    IR 5 milliGRAM(s) Oral every 4 hours PRN  pantoprazole    Tablet 40 milliGRAM(s) Oral before breakfast  senna 2 Tablet(s) Oral at bedtime  temazepam 15 milliGRAM(s) Oral at bedtime PRN  traZODone 100 milliGRAM(s) Oral at bedtime PRN      Allergies    penicillin (Hives; Urticaria)  shellfish (Rash; Anaphylaxis; Hives; Short breath)    Intolerances        LABS:  CBC Full  -  ( 30 Aug 2022 06:10 )  WBC Count : 4.76 K/uL  RBC Count : 3.14 M/uL  Hemoglobin : 11.0 g/dL  Hematocrit : 31.4 %  Platelet Count - Automated : 439 K/uL  Mean Cell Volume : 100.0 fl  Mean Cell Hemoglobin : 35.0 pg  Mean Cell Hemoglobin Concentration : 35.0 gm/dL  Auto Neutrophil # : 4.35 K/uL        08-30    137  |  102  |  10  ----------------------------<  122<H>  3.8   |  28  |  0.73    Ca    9.2      30 Aug 2022 06:10  Phos  1.9     08-30  Mg     1.8     08-30    TPro  6.6  /  Alb  3.0<L>  /  TBili  0.5  /  DBili  x   /  AST  17  /  ALT  17  /  AlkPhos  48  08-30    Hemoglobin A1C:     Vitamin B12     RADIOLOGY    ASSESSMENT AND PLAN:      right sided HA post appendectomy   brin mri-mra and ct of neck unremarkable    ENT EVAL  DECADRON 10 MG IVPB X1  DW DR PIERCE  Pain is accessed and addressed.  Plan of care was discussed with family. Questions answered.  Would continue to follow.               Neurology Follow up note    GRETA HAY82yMale    HPI:  82 year old male with PMH UC, HTN, hypothyroid, HLD, asthma, chronic back pain, anxiety, GERD, hiatal hernia, s/p bilateral THR, s/p multiple ventral hernia repairs (1990, Dr. Goldberg, unsure if mesh was used), s/p bilateral knee arthroscopies, s/p L5-S1 laminectomy/fusion (2017), presents with 2 day history of worsening 7/10 abdominal pain which began in the central lower abdomen and migrating to the RLQ. Patient was doing work on his pool today when he noticed pain had gotten so severe it became difficult to ambulate or bend down. He tried taking suellen seltzer without relief. Last BM this afternoon was normal. He also endorses chills, but denies fever. No chest pain, shortness of breath, nausea, vomiting, melena or hematochezia. (28 Aug 2022 02:45)      Interval History -still c/o right sided HA8-(9/10)    Patient is seen, chart was reviewed and case was discussed with the treatment team.  Pt is not in any distress.   Lying on bed comfortably.   No events reported overnight.       Vital Signs Last 24 Hrs  T(C): 36.4 (30 Aug 2022 13:34), Max: 36.8 (30 Aug 2022 03:58)  T(F): 97.6 (30 Aug 2022 13:34), Max: 98.3 (30 Aug 2022 03:58)  HR: 85 (30 Aug 2022 13:34) (77 - 85)  BP: 155/79 (30 Aug 2022 13:34) (155/79 - 162/71)  BP(mean): --  RR: 18 (30 Aug 2022 13:34) (18 - 18)  SpO2: 94% (30 Aug 2022 13:34) (94% - 96%)    Parameters below as of 30 Aug 2022 13:34  Patient On (Oxygen Delivery Method): room air            REVIEW OF SYSTEMS:    Constitutional: No fever,  Eyes: No eye pain, visual disturbances, or discharge  ENT:  No difficulty hearing, tinnitus, vertigo; No sinus or throat pain  Neck: No pain or stiffness  Respiratory: No cough, wheezing, chills or hemoptysis  Cardiovascular: No chest pain, palpitations, shortness of breath, dizziness or leg swelling  Gastrointestinal: No abdominal or epigastric pain.  Genitourinary: No dysuria, frequency, hematuria or incontinence  Neurological: Severe headaches +  Psychiatric: No depression, mood swings  Musculoskeletal: No joint pain or swelling;   Skin: No itching, burning, rashes or lesions   Lymph Nodes: No enlarged glands  Endocrine: No heat or cold intolerance  Allergy and Immunologic: No hives or eczema    On Neurological Examination:    Mental Status - Pt is alert, awake, oriented X3. . Follows commands well and able to answer questions appropriately.Mood and affect  normal    Speech -  Normal.     Cranial Nerves - Pupils 3 mm equal and reactive to light, extraocular eye movements intact. Pt has no visual field deficit.  Pt has no facial asymmetry. Facial sensation is intact.Tongue - is in midline.    Muscle tone - is normal all over. Moves all extremities equally. No asymmetry is seen.      Motor Exam - 5/5 all over, No drift. No shaking or tremors.    Sensory Exam - Pin prick, temperature, joint position and vibration are intact on either side. Pt withdraws all extremities equally on stimulation. No asymmetry seen. No complaints of tingling, numbness.    Gait - Able to stand and walk unassisted.        coordination:    Finger to nose: normal      Deep tendon Reflexes - 2 plus all over.          Neck Supple -  Yes.     MEDICATIONS    acetaminophen     Tablet .. 1000 milliGRAM(s) Oral every 6 hours PRN  acetaminophen 300 mG/butalbital 50 mG/ caffeine 40 mG 2 Capsule(s) Oral every 8 hours  ALBUTerol    90 MICROgram(s) HFA Inhaler 2 Puff(s) Inhalation every 6 hours PRN  amLODIPine   Tablet 10 milliGRAM(s) Oral daily  artificial  tears Solution 1 Drop(s) Left EYE five times a day PRN  cefTRIAXone   IVPB 1000 milliGRAM(s) IV Intermittent every 24 hours  diphenhydrAMINE 25 milliGRAM(s) Oral every 4 hours PRN  diphenhydrAMINE 25 milliGRAM(s) Oral every 8 hours PRN  enoxaparin Injectable 40 milliGRAM(s) SubCutaneous every 24 hours  fenofibrate Tablet 145 milliGRAM(s) Oral daily  hydrochlorothiazide 25 milliGRAM(s) Oral daily  HYDROmorphone  Injectable 0.5 milliGRAM(s) IV Push every 4 hours PRN  lactated ringers. 1000 milliLiter(s) IV Continuous <Continuous>  levothyroxine 212.5 MICROGram(s) Oral daily  losartan 100 milliGRAM(s) Oral daily  magnesium sulfate  IVPB 2 Gram(s) IV Intermittent once  mesalamine Suppository 1000 milliGRAM(s) Rectal at bedtime  metroNIDAZOLE  IVPB 500 milliGRAM(s) IV Intermittent every 8 hours  morphine  - Injectable 2 milliGRAM(s) IV Push every 3 hours PRN  multivitamin 1 Tablet(s) Oral daily  ondansetron Injectable 4 milliGRAM(s) IV Push every 6 hours PRN  oxyCODONE    IR 5 milliGRAM(s) Oral every 4 hours PRN  pantoprazole    Tablet 40 milliGRAM(s) Oral before breakfast  senna 2 Tablet(s) Oral at bedtime  temazepam 15 milliGRAM(s) Oral at bedtime PRN  traZODone 100 milliGRAM(s) Oral at bedtime PRN      Allergies    penicillin (Hives; Urticaria)  shellfish (Rash; Anaphylaxis; Hives; Short breath)    Intolerances        LABS:  CBC Full  -  ( 30 Aug 2022 06:10 )  WBC Count : 4.76 K/uL  RBC Count : 3.14 M/uL  Hemoglobin : 11.0 g/dL  Hematocrit : 31.4 %  Platelet Count - Automated : 439 K/uL  Mean Cell Volume : 100.0 fl  Mean Cell Hemoglobin : 35.0 pg  Mean Cell Hemoglobin Concentration : 35.0 gm/dL  Auto Neutrophil # : 4.35 K/uL        08-30    137  |  102  |  10  ----------------------------<  122<H>  3.8   |  28  |  0.73    Ca    9.2      30 Aug 2022 06:10  Phos  1.9     08-30  Mg     1.8     08-30    TPro  6.6  /  Alb  3.0<L>  /  TBili  0.5  /  DBili  x   /  AST  17  /  ALT  17  /  AlkPhos  48  08-30    Hemoglobin A1C:     Vitamin B12     RADIOLOGY    ASSESSMENT AND PLAN:      right sided HA post appendectomy   brin mri-mra and ct of neck unremarkable  tried on decadron/imitrx/toradol/fioricet/morphine    ENT EVAL  DECADRON 10 MG IVPB X1  Magnesium 2 gm ivpb  DW DR PIERCE  Pain is accessed and addressed.  Plan of care was discussed with family. Questions answered.  Would continue to follow.

## 2022-08-30 NOTE — PROGRESS NOTE ADULT - ASSESSMENT
82 year old male with PMH UC, HTN, hypothyroid, HLD, asthma, chronic back pain, anxiety, GERD, hiatal hernia, s/p bilateral THR, s/p multiple ventral hernia repairs (1990, Dr. Goldberg, unsure if mesh was used), s/p bilateral knee arthroscopies, s/p L5-S1 laminectomy/fusion (2017), adm with 2 day history of worsening 7/10 abdominal pain which began in the central lower abdomen and migrating to the RLQ. Found to have acute appendicitis     Acute appendicitis with localized peritonitis 28-Aug-2022 22:10:51  Andrew Bernal.  ·  PROCEDURES:  Laparoscopic appendectomy 28-Aug-2022 22:09:58  Andrew Bernal    RECOMMENDATIONS  1- appendicitis - sp surgery and noted localized peritonitis, no sig leukocytosis, afebrile, anticipate limited abx required and  changed to Ceftriaxone/metronidazole   as pt improves can dc on vantin 200mg PO BID and metronidazole 500mg PO TID with last day 9/2    2-Headache/hypertension - normally well controlled in the 120s systolic so suspect headache related to elevated BP but may also be some sort of reaction  -with vascular nature will give one dose SQ triptan and follow response, hard to assess ID issues with focus on head pain and limited sleep    Thank you for consulting us and involving us in the management of this most interesting and challenging case.  We will follow along in the care of this patient. Please call us at [phone] or text me directly on my cell# at [phone] with any concerns.

## 2022-08-31 ENCOUNTER — TRANSCRIPTION ENCOUNTER (OUTPATIENT)
Age: 82
End: 2022-08-31

## 2022-08-31 VITALS
TEMPERATURE: 98 F | DIASTOLIC BLOOD PRESSURE: 83 MMHG | SYSTOLIC BLOOD PRESSURE: 149 MMHG | HEART RATE: 73 BPM | RESPIRATION RATE: 18 BRPM | OXYGEN SATURATION: 97 %

## 2022-08-31 LAB
ALBUMIN SERPL ELPH-MCNC: 3.5 G/DL — SIGNIFICANT CHANGE UP (ref 3.3–5)
ALP SERPL-CCNC: 52 U/L — SIGNIFICANT CHANGE UP (ref 40–120)
ALT FLD-CCNC: 21 U/L — SIGNIFICANT CHANGE UP (ref 12–78)
ANION GAP SERPL CALC-SCNC: 6 MMOL/L — SIGNIFICANT CHANGE UP (ref 5–17)
AST SERPL-CCNC: 22 U/L — SIGNIFICANT CHANGE UP (ref 15–37)
BASOPHILS # BLD AUTO: 0.01 K/UL — SIGNIFICANT CHANGE UP (ref 0–0.2)
BASOPHILS NFR BLD AUTO: 0.2 % — SIGNIFICANT CHANGE UP (ref 0–2)
BILIRUB SERPL-MCNC: 0.6 MG/DL — SIGNIFICANT CHANGE UP (ref 0.2–1.2)
BUN SERPL-MCNC: 15 MG/DL — SIGNIFICANT CHANGE UP (ref 7–23)
CALCIUM SERPL-MCNC: 9.2 MG/DL — SIGNIFICANT CHANGE UP (ref 8.5–10.1)
CHLORIDE SERPL-SCNC: 97 MMOL/L — SIGNIFICANT CHANGE UP (ref 96–108)
CO2 SERPL-SCNC: 29 MMOL/L — SIGNIFICANT CHANGE UP (ref 22–31)
CREAT SERPL-MCNC: 0.91 MG/DL — SIGNIFICANT CHANGE UP (ref 0.5–1.3)
EGFR: 85 ML/MIN/1.73M2 — SIGNIFICANT CHANGE UP
EOSINOPHIL # BLD AUTO: 0.07 K/UL — SIGNIFICANT CHANGE UP (ref 0–0.5)
EOSINOPHIL NFR BLD AUTO: 1.1 % — SIGNIFICANT CHANGE UP (ref 0–6)
GLUCOSE SERPL-MCNC: 93 MG/DL — SIGNIFICANT CHANGE UP (ref 70–99)
HCT VFR BLD CALC: 35.8 % — LOW (ref 39–50)
HGB BLD-MCNC: 12.6 G/DL — LOW (ref 13–17)
IMM GRANULOCYTES NFR BLD AUTO: 0.5 % — SIGNIFICANT CHANGE UP (ref 0–1.5)
LYMPHOCYTES # BLD AUTO: 0.61 K/UL — LOW (ref 1–3.3)
LYMPHOCYTES # BLD AUTO: 9.7 % — LOW (ref 13–44)
MAGNESIUM SERPL-MCNC: 2.1 MG/DL — SIGNIFICANT CHANGE UP (ref 1.6–2.6)
MCHC RBC-ENTMCNC: 34.7 PG — HIGH (ref 27–34)
MCHC RBC-ENTMCNC: 35.2 GM/DL — SIGNIFICANT CHANGE UP (ref 32–36)
MCV RBC AUTO: 98.6 FL — SIGNIFICANT CHANGE UP (ref 80–100)
MONOCYTES # BLD AUTO: 0.45 K/UL — SIGNIFICANT CHANGE UP (ref 0–0.9)
MONOCYTES NFR BLD AUTO: 7.2 % — SIGNIFICANT CHANGE UP (ref 2–14)
NEUTROPHILS # BLD AUTO: 5.09 K/UL — SIGNIFICANT CHANGE UP (ref 1.8–7.4)
NEUTROPHILS NFR BLD AUTO: 81.3 % — HIGH (ref 43–77)
NRBC # BLD: 0 /100 WBCS — SIGNIFICANT CHANGE UP (ref 0–0)
PHOSPHATE SERPL-MCNC: 2.6 MG/DL — SIGNIFICANT CHANGE UP (ref 2.5–4.5)
PLATELET # BLD AUTO: 588 K/UL — HIGH (ref 150–400)
POTASSIUM SERPL-MCNC: 3.3 MMOL/L — LOW (ref 3.5–5.3)
POTASSIUM SERPL-SCNC: 3.3 MMOL/L — LOW (ref 3.5–5.3)
PROT SERPL-MCNC: 7.2 G/DL — SIGNIFICANT CHANGE UP (ref 6–8.3)
RBC # BLD: 3.63 M/UL — LOW (ref 4.2–5.8)
RBC # FLD: 14.5 % — SIGNIFICANT CHANGE UP (ref 10.3–14.5)
SODIUM SERPL-SCNC: 132 MMOL/L — LOW (ref 135–145)
TSH SERPL-MCNC: 0.24 UIU/ML — LOW (ref 0.36–3.74)
WBC # BLD: 6.26 K/UL — SIGNIFICANT CHANGE UP (ref 3.8–10.5)
WBC # FLD AUTO: 6.26 K/UL — SIGNIFICANT CHANGE UP (ref 3.8–10.5)

## 2022-08-31 PROCEDURE — 99233 SBSQ HOSP IP/OBS HIGH 50: CPT | Mod: GC

## 2022-08-31 RX ORDER — METRONIDAZOLE 500 MG
1 TABLET ORAL
Qty: 6 | Refills: 0
Start: 2022-08-31 | End: 2022-09-01

## 2022-08-31 RX ORDER — CEFPODOXIME PROXETIL 100 MG
1 TABLET ORAL
Qty: 4 | Refills: 0
Start: 2022-08-31 | End: 2022-09-01

## 2022-08-31 RX ORDER — POTASSIUM CHLORIDE 20 MEQ
40 PACKET (EA) ORAL ONCE
Refills: 0 | Status: COMPLETED | OUTPATIENT
Start: 2022-08-31 | End: 2022-08-31

## 2022-08-31 RX ADMIN — Medication 100 MILLIGRAM(S): at 13:14

## 2022-08-31 RX ADMIN — AMLODIPINE BESYLATE 10 MILLIGRAM(S): 2.5 TABLET ORAL at 05:43

## 2022-08-31 RX ADMIN — Medication 2 CAPSULE(S): at 05:37

## 2022-08-31 RX ADMIN — Medication 2 CAPSULE(S): at 13:41

## 2022-08-31 RX ADMIN — MERCAPTOPURINE 100 MILLIGRAM(S): 50 TABLET ORAL at 05:36

## 2022-08-31 RX ADMIN — Medication 2 CAPSULE(S): at 06:24

## 2022-08-31 RX ADMIN — PANTOPRAZOLE SODIUM 40 MILLIGRAM(S): 20 TABLET, DELAYED RELEASE ORAL at 06:01

## 2022-08-31 RX ADMIN — ENOXAPARIN SODIUM 40 MILLIGRAM(S): 100 INJECTION SUBCUTANEOUS at 05:38

## 2022-08-31 RX ADMIN — Medication 100 MILLIGRAM(S): at 05:38

## 2022-08-31 RX ADMIN — Medication 2 CAPSULE(S): at 13:14

## 2022-08-31 RX ADMIN — Medication 1 TABLET(S): at 11:22

## 2022-08-31 RX ADMIN — Medication 25 MILLIGRAM(S): at 05:37

## 2022-08-31 RX ADMIN — CEFTRIAXONE 100 MILLIGRAM(S): 500 INJECTION, POWDER, FOR SOLUTION INTRAMUSCULAR; INTRAVENOUS at 08:24

## 2022-08-31 RX ADMIN — LOSARTAN POTASSIUM 100 MILLIGRAM(S): 100 TABLET, FILM COATED ORAL at 05:43

## 2022-08-31 RX ADMIN — Medication 212.5 MICROGRAM(S): at 05:37

## 2022-08-31 RX ADMIN — Medication 145 MILLIGRAM(S): at 11:22

## 2022-08-31 RX ADMIN — Medication 40 MILLIEQUIVALENT(S): at 11:23

## 2022-08-31 NOTE — PROGRESS NOTE ADULT - SUBJECTIVE AND OBJECTIVE BOX
Patient is a 82y old  Male who presents with a chief complaint of Acute appendicitis (31 Aug 2022 08:28)  medical consult for surgery team   pt seen and examine today alert awake , tolerating   reg diet .  INTERVAL HPI/OVERNIGHT EVENTS:     T(C): 36.5 (08-31-22 @ 05:08), Max: 36.8 (08-30-22 @ 19:49)  HR: 83 (08-31-22 @ 05:08) (71 - 85)  BP: 121/57 (08-31-22 @ 05:08) (121/57 - 155/79)  RR: 18 (08-31-22 @ 05:08) (18 - 18)  SpO2: 96% (08-31-22 @ 05:08) (94% - 96%)  Wt(kg): --  I&O's Summary    30 Aug 2022 07:01  -  31 Aug 2022 07:00  --------------------------------------------------------  IN: 1150 mL / OUT: 0 mL / NET: 1150 mL        REVIEW OF SYSTEMS:  CONSTITUTIONAL: No fever, weight loss, or fatigue  EYES: No eye pain, visual disturbances, or discharge  ENMT:; No sinus or throat pain  NECK: No pain or stiffness  RESPIRATORY: No cough, wheezing, chills  No shortness of breath  CARDIOVASCULAR: No chest pain, palpitations, dizziness, or leg swelling  GASTROINTESTINAL: No abdominal or epigastric pain. No nausea, vomiting,; No diarrhea or constipation.   GENITOURINARY: No dysuria, frequency, hematuria, or incontinence  NEUROLOGICAL: No headaches, memory loss, loss of strength, numbness, or tremors  SKIN: No itching, burning, rashes, or lesions   MUSCULOSKELETAL: No joint pain or swelling; No muscle, back, or extremity pain    PHYSICAL EXAM:  GENERAL: NAD,  HEAD:  Atraumatic, Normocephalic  EYES: EOMI, PERRLA, conjunctiva and sclera clear  ENMT: Moist mucous membranes  NECK: Supple, No JVD  NERVOUS SYSTEM:  Alert & Oriented X3; Motor Strength 5/5 B/L upper and lower extremities; DTRs 2+ intact and symmetric  CHEST/LUNG:  percussion bilaterally; No rales, rhonchi, wheezing,   HEART: Regular rate and rhythm; No murmurs,  no tachy   ABDOMEN: Soft, Nontender, Nondistended; Bowel sounds present surgical site intact +  EXTREMITIES:  2+ Peripheral Pulses, No clubbing, cyanosis, or edema  SKIN: No rashes or lesions    MEDICATIONS  (STANDING):  acetaminophen 300 mG/butalbital 50 mG/ caffeine 40 mG 2 Capsule(s) Oral every 8 hours  amLODIPine   Tablet 10 milliGRAM(s) Oral daily  cefTRIAXone   IVPB 1000 milliGRAM(s) IV Intermittent every 24 hours  enoxaparin Injectable 40 milliGRAM(s) SubCutaneous every 24 hours  fenofibrate Tablet 145 milliGRAM(s) Oral daily  hydrochlorothiazide 25 milliGRAM(s) Oral daily  levothyroxine 212.5 MICROGram(s) Oral daily  losartan 100 milliGRAM(s) Oral daily  mesalamine Suppository 1000 milliGRAM(s) Rectal at bedtime  metroNIDAZOLE  IVPB 500 milliGRAM(s) IV Intermittent every 8 hours  multivitamin 1 Tablet(s) Oral daily  pantoprazole    Tablet 40 milliGRAM(s) Oral before breakfast  potassium chloride    Tablet ER 40 milliEquivalent(s) Oral once  senna 2 Tablet(s) Oral at bedtime    MEDICATIONS  (PRN):  ALBUTerol    90 MICROgram(s) HFA Inhaler 2 Puff(s) Inhalation every 6 hours PRN Bronchospasm  artificial  tears Solution 1 Drop(s) Left EYE five times a day PRN Dry Eyes  diphenhydrAMINE 25 milliGRAM(s) Oral every 8 hours PRN Rash and/or Itching  hydrALAZINE Injectable 10 milliGRAM(s) IV Push every 8 hours PRN SBP>180  ondansetron Injectable 4 milliGRAM(s) IV Push every 6 hours PRN Nausea  oxyCODONE    IR 5 milliGRAM(s) Oral every 4 hours PRN Moderate Pain (4 - 6)  temazepam 15 milliGRAM(s) Oral at bedtime PRN Insomnia  traZODone 100 milliGRAM(s) Oral at bedtime PRN insomnia      LABS:                        12.6   6.26  )-----------( 588      ( 31 Aug 2022 07:00 )             35.8     08-31    132<L>  |  97  |  15  ----------------------------<  93  3.3<L>   |  29  |  0.91    Ca    9.2      31 Aug 2022 07:00  Phos  2.6     08-31  Mg     2.1     08-31    TPro  7.2  /  Alb  3.5  /  TBili  0.6  /  DBili  x   /  AST  22  /  ALT  21  /  AlkPhos  52  08-31                      RADIOLOGY & ADDITIONAL TESTS:    Imaging Personally Reviewed:     no new test  Advance Directives:  full code

## 2022-08-31 NOTE — PROGRESS NOTE ADULT - SUBJECTIVE AND OBJECTIVE BOX
Neurology Follow up note    GRETA HAY82yMale    HPI:  82 year old male with PMH UC, HTN, hypothyroid, HLD, asthma, chronic back pain, anxiety, GERD, hiatal hernia, s/p bilateral THR, s/p multiple ventral hernia repairs (1990, Dr. Goldberg, unsure if mesh was used), s/p bilateral knee arthroscopies, s/p L5-S1 laminectomy/fusion (2017), presents with 2 day history of worsening 7/10 abdominal pain which began in the central lower abdomen and migrating to the RLQ. Patient was doing work on his pool today when he noticed pain had gotten so severe it became difficult to ambulate or bend down. He tried taking suellen seltzer without relief. Last BM this afternoon was normal. He also endorses chills, but denies fever. No chest pain, shortness of breath, nausea, vomiting, melena or hematochezia. (28 Aug 2022 02:45)      Interval History -HA improving    Patient is seen, chart was reviewed and case was discussed with the treatment team.  Pt is not in any distress.   Lying on bed comfortably.   No events reported overnight.     Vital Signs Last 24 Hrs  T(C): 36.5 (31 Aug 2022 05:08), Max: 36.8 (30 Aug 2022 19:49)  T(F): 97.7 (31 Aug 2022 05:08), Max: 98.2 (30 Aug 2022 19:49)  HR: 83 (31 Aug 2022 05:08) (71 - 85)  BP: 121/57 (31 Aug 2022 05:08) (121/57 - 155/79)  BP(mean): --  RR: 18 (31 Aug 2022 05:08) (18 - 18)  SpO2: 96% (31 Aug 2022 05:08) (94% - 96%)    Parameters below as of 31 Aug 2022 05:08  Patient On (Oxygen Delivery Method): room air                REVIEW OF SYSTEMS:    Constitutional: No fever,  Eyes: No eye pain, visual disturbances, or discharge  ENT:  No difficulty hearing, tinnitus, vertigo; No sinus or throat pain  Neck: No pain or stiffness  Respiratory: No cough, wheezing, chills or hemoptysis  Cardiovascular: No chest pain, palpitations, shortness of breath, dizziness or leg swelling  Gastrointestinal: No abdominal or epigastric pain.  Genitourinary: No dysuria, frequency, hematuria or incontinence  Neurological: Severe headaches +  Psychiatric: No depression, mood swings  Musculoskeletal: No joint pain or swelling;   Skin: No itching, burning, rashes or lesions   Lymph Nodes: No enlarged glands  Endocrine: No heat or cold intolerance  Allergy and Immunologic: No hives or eczema    On Neurological Examination:    Mental Status - Pt is alert, awake, oriented X3. . Follows commands well and able to answer questions appropriately.Mood and affect  normal    Speech -  Normal.     Cranial Nerves - Pupils 3 mm equal and reactive to light, extraocular eye movements intact. Pt has no visual field deficit.  Pt has no facial asymmetry. Facial sensation is intact.Tongue - is in midline.    Muscle tone - is normal all over. Moves all extremities equally. No asymmetry is seen.      Motor Exam - 5/5 all over, No drift. No shaking or tremors.    Sensory Exam - Pin prick, temperature, joint position and vibration are intact on either side. Pt withdraws all extremities equally on stimulation. No asymmetry seen. No complaints of tingling, numbness.    Gait - Able to stand and walk unassisted.        coordination:    Finger to nose: normal      Deep tendon Reflexes - 2 plus all over.          Neck Supple -  Yes.     MEDICATIONS  (STANDING):  acetaminophen 300 mG/butalbital 50 mG/ caffeine 40 mG 2 Capsule(s) Oral every 8 hours  amLODIPine   Tablet 10 milliGRAM(s) Oral daily  cefTRIAXone   IVPB 1000 milliGRAM(s) IV Intermittent every 24 hours  enoxaparin Injectable 40 milliGRAM(s) SubCutaneous every 24 hours  fenofibrate Tablet 145 milliGRAM(s) Oral daily  hydrochlorothiazide 25 milliGRAM(s) Oral daily  levothyroxine 212.5 MICROGram(s) Oral daily  losartan 100 milliGRAM(s) Oral daily  mesalamine Suppository 1000 milliGRAM(s) Rectal at bedtime  metroNIDAZOLE  IVPB 500 milliGRAM(s) IV Intermittent every 8 hours  multivitamin 1 Tablet(s) Oral daily  pantoprazole    Tablet 40 milliGRAM(s) Oral before breakfast  senna 2 Tablet(s) Oral at bedtime    MEDICATIONS  (PRN):  ALBUTerol    90 MICROgram(s) HFA Inhaler 2 Puff(s) Inhalation every 6 hours PRN Bronchospasm  artificial  tears Solution 1 Drop(s) Left EYE five times a day PRN Dry Eyes  diphenhydrAMINE 25 milliGRAM(s) Oral every 8 hours PRN Rash and/or Itching  hydrALAZINE Injectable 10 milliGRAM(s) IV Push every 8 hours PRN SBP>180  ondansetron Injectable 4 milliGRAM(s) IV Push every 6 hours PRN Nausea  oxyCODONE    IR 5 milliGRAM(s) Oral every 4 hours PRN Moderate Pain (4 - 6)  temazepam 15 milliGRAM(s) Oral at bedtime PRN Insomnia  traZODone 100 milliGRAM(s) Oral at bedtime PRN insomnia        Allergies    penicillin (Hives; Urticaria)  shellfish (Rash; Anaphylaxis; Hives; Short breath)    Intolerances      08-31    132<L>  |  97  |  15  ----------------------------<  93  3.3<L>   |  29  |  0.91    Ca    9.2      31 Aug 2022 07:00  Phos  2.6     08-31  Mg     2.1     08-31    TPro  7.2  /  Alb  3.5  /  TBili  0.6  /  DBili  x   /  AST  22  /  ALT  21  /  AlkPhos  52  08-31      TPro  6.6  /  Alb  3.0<L>  /  TBili  0.5  /  DBili  x   /  AST  17  /  ALT  17  /  AlkPhos  48  08-30    Hemoglobin A1C:     Vitamin B12     RADIOLOGY    ASSESSMENT AND PLAN:      right sided HA post appendectomy ; HA much better today  brin mri-mra and ct of neck unremarkable        no further neuro w/u  fioricet prn  Pain is accessed and addressed.  Plan of care was discussed with family. Questions answered.  Would continue to follow.

## 2022-08-31 NOTE — PROGRESS NOTE ADULT - SUBJECTIVE AND OBJECTIVE BOX
[facility] DIVISION of INFECTIOUS DISEASE  Albin Luna MD PhD, Nemo Roland MD, Beth Priest MD, Ronnie Pak MD, Adria Rizzo MD  and providing coverage with Trevor Roman MD  Providing Infectious Disease Consultations at [facility], [facility], [facility]    Office# [phone] to schedule follow up appointments  Answering Service for urgent calls or New Consults [phone]  Cell# to text for urgent issues Albin Luna [phone]     infectious diseases progress note:    GRETA HAY is a 82y y. o. Male patient    Overnight and events of the last 24hrs reviewed    Allergies    penicillin (Hives; Urticaria)  shellfish (Rash; Anaphylaxis; Hives; Short breath)    Intolerances        ANTIBIOTICS/RELEVANT:  antimicrobials  cefTRIAXone   IVPB 1000 milliGRAM(s) IV Intermittent every 24 hours  metroNIDAZOLE  IVPB 500 milliGRAM(s) IV Intermittent every 8 hours    immunologic:    OTHER:  acetaminophen 300 mG/butalbital 50 mG/ caffeine 40 mG 2 Capsule(s) Oral every 8 hours  ALBUTerol    90 MICROgram(s) HFA Inhaler 2 Puff(s) Inhalation every 6 hours PRN  amLODIPine   Tablet 10 milliGRAM(s) Oral daily  artificial  tears Solution 1 Drop(s) Left EYE five times a day PRN  diphenhydrAMINE 25 milliGRAM(s) Oral every 8 hours PRN  enoxaparin Injectable 40 milliGRAM(s) SubCutaneous every 24 hours  fenofibrate Tablet 145 milliGRAM(s) Oral daily  hydrALAZINE Injectable 10 milliGRAM(s) IV Push every 8 hours PRN  hydrochlorothiazide 25 milliGRAM(s) Oral daily  levothyroxine 212.5 MICROGram(s) Oral daily  losartan 100 milliGRAM(s) Oral daily  mesalamine Suppository 1000 milliGRAM(s) Rectal at bedtime  multivitamin 1 Tablet(s) Oral daily  ondansetron Injectable 4 milliGRAM(s) IV Push every 6 hours PRN  oxyCODONE    IR 5 milliGRAM(s) Oral every 4 hours PRN  pantoprazole    Tablet 40 milliGRAM(s) Oral before breakfast  senna 2 Tablet(s) Oral at bedtime  temazepam 15 milliGRAM(s) Oral at bedtime PRN  traZODone 100 milliGRAM(s) Oral at bedtime PRN      Objective:  Vital Signs Last 24 Hrs  T(C): 36.6 (31 Aug 2022 12:55), Max: 36.8 (30 Aug 2022 19:49)  T(F): 97.9 (31 Aug 2022 12:55), Max: 98.2 (30 Aug 2022 19:49)  HR: 73 (31 Aug 2022 12:55) (71 - 83)  BP: 149/83 (31 Aug 2022 12:55) (121/57 - 149/83)  BP(mean): --  RR: 18 (31 Aug 2022 12:55) (18 - 18)  SpO2: 97% (31 Aug 2022 12:55) (96% - 97%)    Parameters below as of 31 Aug 2022 12:55  Patient On (Oxygen Delivery Method): room air        T(C): 36.6 (08-31-22 @ 12:55), Max: 36.8 (08-30-22 @ 03:58)  T(C): 36.6 (08-31-22 @ 12:55), Max: 37.1 (08-29-22 @ 00:47)  T(C): 36.6 (08-31-22 @ 12:55), Max: 37.3 (08-28-22 @ 07:57)    PHYSICAL EXAM:  HEENT: NC atraumatic  Neck: supple  Respiratory: no accessory muscle use, breathing comfortably  Cardiovascular: distant  Gastrointestinal: normal appearing, nondistended  Extremities: no clubbing, no cyanosis,        LABS:                          12.6   6.26  )-----------( 588      ( 31 Aug 2022 07:00 )             35.8       WBC  6.26 08-31 @ 07:00  4.76 08-30 @ 06:10  5.58 08-29 @ 06:47  9.41 08-27 @ 22:30      08-31    132<L>  |  97  |  15  ----------------------------<  93  3.3<L>   |  29  |  0.91    Ca    9.2      31 Aug 2022 07:00  Phos  2.6     08-31  Mg     2.1     08-31    TPro  7.2  /  Alb  3.5  /  TBili  0.6  /  DBili  x   /  AST  22  /  ALT  21  /  AlkPhos  52  08-31      Creatinine, Serum: 0.91 mg/dL (08-31-22 @ 07:00)  Creatinine, Serum: 0.73 mg/dL (08-30-22 @ 06:10)  Creatinine, Serum: 1.50 mg/dL (08-29-22 @ 06:47)  Creatinine, Serum: 1.00 mg/dL (08-27-22 @ 22:30)                INFLAMMATORY MARKERS  Auto Neutrophil #: 5.09 K/uL (08-31-22 @ 07:00)  Auto Lymphocyte #: 0.61 K/uL (08-31-22 @ 07:00)  Auto Neutrophil #: 4.35 K/uL (08-30-22 @ 06:10)  Auto Lymphocyte #: 0.16 K/uL (08-30-22 @ 06:10)  Auto Lymphocyte #: 0.19 K/uL (08-27-22 @ 22:30)  Auto Neutrophil #: 9.03 K/uL (08-27-22 @ 22:30)    Lactate, Blood: 1.2 mmol/L (08-27-22 @ 23:25)    Auto Eosinophil #: 0.07 K/uL (08-31-22 @ 07:00)  Auto Eosinophil #: 0.00 K/uL (08-30-22 @ 06:10)  Auto Eosinophil #: 0.00 K/uL (08-27-22 @ 22:30)                        INR: 1.14 ratio (08-28-22 @ 03:15)  Activated Partial Thromboplastin Time: 31.1 sec (08-28-22 @ 03:15)          MICROBIOLOGY:              RADIOLOGY & ADDITIONAL STUDIES:

## 2022-08-31 NOTE — PROGRESS NOTE ADULT - TIME BILLING
pt seen and examine today - 81 y/o Male patient POD#3 S/P lap appendectomy. on reg diet tolerating well .  pt s/p episode of  Headaches possible stress related tension headache- resolved    dc plan as per surgery medically stable to be dc  sign off call as needed.
see above
see above

## 2022-08-31 NOTE — PROGRESS NOTE ADULT - ASSESSMENT
82 year old male with PMH UC, HTN, hypothyroid, HLD, asthma, chronic back pain, anxiety, GERD, hiatal hernia, s/p bilateral THR, s/p multiple ventral hernia repairs (1990, Dr. Goldberg, unsure if mesh was used), s/p bilateral knee arthroscopies, s/p L5-S1 laminectomy/fusion (2017), adm with 2 day history of worsening 7/10 abdominal pain which began in the central lower abdomen and migrating to the RLQ. Found to have acute appendicitis     Acute appendicitis with localized peritonitis 28-Aug-2022 22:10:51  Andrew Bernal.  ·  PROCEDURES:  Laparoscopic appendectomy 28-Aug-2022 22:09:58  Andrew Bernal    RECOMMENDATIONS  1- appendicitis - sp surgery and noted localized peritonitis, no sig leukocytosis, afebrile, anticipate limited abx required and     can dc on vantin 200mg PO BID and metronidazole 500mg PO TID with last day 9/2    2-Headache/hypertension - consider dc on imitrex 100mg PO Qd prn migraine disp#9    From an ID standpoint no further requirement for inpatient status for the management of ID issues. Fine with discharge from ID standpoint when other medical issues no longer require inpatient care and social issues allow for a safe discharge plan. To schedule an outpatient ID follow up appointment please call our office at [phone]    Thank you for consulting us and involving us in the management of this most interesting and challenging case.  Please call us at [phone] or text me directly on my cell#[phone] with any concerns or further questions.

## 2022-08-31 NOTE — PROGRESS NOTE ADULT - SUBJECTIVE AND OBJECTIVE BOX
Pt seen and examined at bedside, reports his headaches have improved significantly overnight. Vital signs stable. Denies any abdominal pain. Tolerating diet. Denies any nausea/vomiting. Pt reports passing flatus, (+) BM this morning. Pt OOB & ambulating.  Denies headache, chest pain, palpitations, shortness of breath, weakness or fatigue.    T(C): 36.5 (08-31-22 @ 05:08), Max: 36.8 (08-30-22 @ 19:49)  HR: 83 (08-31-22 @ 05:08) (71 - 85)  BP: 121/57 (08-31-22 @ 05:08) (121/57 - 155/79)  RR: 18 (08-31-22 @ 05:08) (18 - 18)  SpO2: 96% (08-31-22 @ 05:08) (94% - 96%)    PHYSICAL EXAM:  General: no acute distress, appears comfortable  Pulm: non labored respirations  Abdomen: soft, nontender, nondistended, (+) BS, surgical incisions intact w/ steri-strips in place  Extremities: no calf tenderness noted    I&O's Detail    30 Aug 2022 07:01  -  31 Aug 2022 07:00  --------------------------------------------------------  IN:    IV PiggyBack: 250 mL    Lactated Ringers: 600 mL    Oral Fluid: 300 mL  Total IN: 1150 mL    OUT:  Total OUT: 0 mL    Total NET: 1150 mL      LABS:                        12.6   6.26  )-----------( 588      ( 31 Aug 2022 07:00 )             35.8     08-31    132<L>  |  97  |  15  ----------------------------<  93  3.3<L>   |  29  |  0.91    Ca    9.2      31 Aug 2022 07:00  Phos  2.6     08-31  Mg     2.1     08-31    TPro  7.2  /  Alb  3.5  /  TBili  0.6  /  DBili  x   /  AST  22  /  ALT  21  /  AlkPhos  52  08-31    CAPILLARY BLOOD GLUCOSE    RADIOLOGY & ADDITIONAL STUDIES:    MEDICATIONS:  acetaminophen 300 mG/butalbital 50 mG/ caffeine 40 mG 2 Capsule(s) Oral every 8 hours  ALBUTerol    90 MICROgram(s) HFA Inhaler 2 Puff(s) Inhalation every 6 hours PRN  amLODIPine   Tablet 10 milliGRAM(s) Oral daily  artificial  tears Solution 1 Drop(s) Left EYE five times a day PRN  cefTRIAXone   IVPB 1000 milliGRAM(s) IV Intermittent every 24 hours  diphenhydrAMINE 25 milliGRAM(s) Oral every 8 hours PRN  enoxaparin Injectable 40 milliGRAM(s) SubCutaneous every 24 hours  fenofibrate Tablet 145 milliGRAM(s) Oral daily  hydrALAZINE Injectable 10 milliGRAM(s) IV Push every 8 hours PRN  hydrochlorothiazide 25 milliGRAM(s) Oral daily  lactated ringers. 1000 milliLiter(s) IV Continuous <Continuous>  levothyroxine 212.5 MICROGram(s) Oral daily  losartan 100 milliGRAM(s) Oral daily  mesalamine Suppository 1000 milliGRAM(s) Rectal at bedtime  metroNIDAZOLE  IVPB 500 milliGRAM(s) IV Intermittent every 8 hours  multivitamin 1 Tablet(s) Oral daily  ondansetron Injectable 4 milliGRAM(s) IV Push every 6 hours PRN  oxyCODONE    IR 5 milliGRAM(s) Oral every 4 hours PRN  pantoprazole    Tablet 40 milliGRAM(s) Oral before breakfast  senna 2 Tablet(s) Oral at bedtime  temazepam 15 milliGRAM(s) Oral at bedtime PRN  traZODone 100 milliGRAM(s) Oral at bedtime PRN

## 2022-08-31 NOTE — PROGRESS NOTE ADULT - REASON FOR ADMISSION
Acute appendicitis

## 2022-08-31 NOTE — PROGRESS NOTE ADULT - ASSESSMENT
83 y/o Male patient POD#3 S/P lap appendectomy. Headaches have greatly improved.    PLAN:  - Continue diet, continue IV abx  - Plan for discharge today  - AM labs, replete K+  - Above to be discussed with Dr. Bernal     Surgical Team  Spectralink: [id number] 83 y/o Male patient POD#3 S/P lap appendectomy. Headaches have greatly improved.  PLAN:  - Continue diet, continue IV abx  - Plan for discharge today  - AM labs, replete K+  - Above to be discussed with Dr. Bernal   Surgical Team   Spectralink: [phone]    POD #3, S/P Laparoscopic Appendectomy for appendicitis with localized peritonitis.  Mr. Victoria personally seen and examined during early PM rounds.  Headache/ migraine now "70%... 80% better..."  Eating well with no incisional pain or abdominal discomfort.  Vitals non-suggestive.  Wounds clean and abdomen soft with appropriate incisional tenderness.  Labs reassuring.  Clinically, improving overall and surgically stable at present.  Will discharge to home.  PO ABX as per ID, outpatient follow-up with PMD for BP check, outpatient follow-up with Neurology at his convenience, post op appointment with me in 2 weeks.  He is pleased and agrees.  Reviewed with patient in detail, Surgery PA and today's RN team.

## 2022-08-31 NOTE — PROGRESS NOTE ADULT - PROVIDER SPECIALTY LIST ADULT
Hospitalist
Infectious Disease
Neurology
Surgery
Surgery
Anesthesia
Neurology
Neurology
Infectious Disease
Surgery
Surgery
Hospitalist
Hospitalist

## 2022-08-31 NOTE — DISCHARGE NOTE NURSING/CASE MANAGEMENT/SOCIAL WORK - PATIENT PORTAL LINK FT
You can access the FollowMyHealth Patient Portal offered by [facility] by registering at the following website: [email or web address]. By joining [facility]’s FollowMyHealth portal, you will also be able to view your health information using other applications (apps) compatible with our system.

## 2022-08-31 NOTE — PROGRESS NOTE ADULT - NSPROGADDITIONALINFOA_GEN_ALL_CORE
Headache: mgmt per neurology  -MRI BRAIN: No acute intracranial hemorrhage or evidence of acute ischemia.  A few small rounded nonspecific abnormal white matter foci of T2/FLAIR   prolongation statistically favoring microvascular disease.  MRA HEAD: Unremarkable study.  CT neck: No evidence of cellulitis. Mild degenerative changes of the   LEFT temporomandibular joint.

## 2022-08-31 NOTE — PROGRESS NOTE ADULT - ASSESSMENT
81yo M with PMH UC, HTN, hypothyroid, HLD, asthma, chronic back pain, anxiety, GERD, hiatal hernia, generalized arthritis, s/p bilateral total hip replacement, s/p multiple ventral hernia repairs (1990, Dr. Goldberg, unsure if mesh was used), s/p bilateral knee arthroscopies, s/p L5-S1 laminectomy/fusion (2017), presents with 2 day history of worsening 7/10 abdominal pain, admitted for acute appendicitis.  Problem/Plan - 1:  ·  Problem: Appendicitis, acute.   ·  Plan post op day 3 , on reg diet   -continue IV abx- changed to Ceftriaxone/metronidazole by ID team.  Problem/Plan - 2:  ·  Problem: HTN (hypertension).   ·  Plan: BP elevated earlier in the day but has since improved.   -started on hydralazine PRN for SBP>180  -continue to monitor  -conitnue Norvasc, HCTZ, Losartan.- bp stable   Problem/Plan - 3:  ·  Problem: Hypercholesteremia.   ·  Plan: continue fenofibrate.  Problem/Plan - 4:  ·  Problem: Ulcerative colitis.   ·  Plan: stable no symptom   -continue Canasa.  Problem/Plan - 5:  ·  Problem: Reflux esophagitis.   ·  Plan: Continue Protonix.  Problem/Plan - 6:  ·  Problem: Hypothyroid.   ·  Plan: continue levothyroxine  -check TSH in am.  Problem/Plan - 7:  ·  Problem: Depression.   ·  Plan: continue to monitor for symptoms.  Problem/Plan - 8:  ·  Problem: Anxiety.   ·  Plan: no anxiety currently.  Problem/Plan - 9:  ·  Problem: Mild intermittent asthma without complication.   ·  Plan: no acute exacerbation.  Problem/Plan - 10:  ·  Problem: Need for prophylactic measure.   ·  Plan; lovenox.

## 2022-09-12 PROBLEM — K51.90 ULCERATIVE COLITIS, UNSPECIFIED, WITHOUT COMPLICATIONS: Chronic | Status: ACTIVE | Noted: 2022-08-28

## 2022-09-15 PROCEDURE — 84443 ASSAY THYROID STIM HORMONE: CPT

## 2022-09-15 PROCEDURE — 86850 RBC ANTIBODY SCREEN: CPT

## 2022-09-15 PROCEDURE — 93005 ELECTROCARDIOGRAM TRACING: CPT

## 2022-09-15 PROCEDURE — 84100 ASSAY OF PHOSPHORUS: CPT

## 2022-09-15 PROCEDURE — 70551 MRI BRAIN STEM W/O DYE: CPT

## 2022-09-15 PROCEDURE — 85025 COMPLETE CBC W/AUTO DIFF WBC: CPT

## 2022-09-15 PROCEDURE — 83690 ASSAY OF LIPASE: CPT

## 2022-09-15 PROCEDURE — 86900 BLOOD TYPING SEROLOGIC ABO: CPT

## 2022-09-15 PROCEDURE — 99285 EMERGENCY DEPT VISIT HI MDM: CPT | Mod: 25

## 2022-09-15 PROCEDURE — 85027 COMPLETE CBC AUTOMATED: CPT

## 2022-09-15 PROCEDURE — 87635 SARS-COV-2 COVID-19 AMP PRB: CPT

## 2022-09-15 PROCEDURE — 80053 COMPREHEN METABOLIC PANEL: CPT

## 2022-09-15 PROCEDURE — 74177 CT ABD & PELVIS W/CONTRAST: CPT | Mod: MA

## 2022-09-15 PROCEDURE — 80048 BASIC METABOLIC PNL TOTAL CA: CPT

## 2022-09-15 PROCEDURE — 81001 URINALYSIS AUTO W/SCOPE: CPT

## 2022-09-15 PROCEDURE — 70450 CT HEAD/BRAIN W/O DYE: CPT

## 2022-09-15 PROCEDURE — 88304 TISSUE EXAM BY PATHOLOGIST: CPT

## 2022-09-15 PROCEDURE — 83605 ASSAY OF LACTIC ACID: CPT

## 2022-09-15 PROCEDURE — 70544 MR ANGIOGRAPHY HEAD W/O DYE: CPT

## 2022-09-15 PROCEDURE — C1889: CPT

## 2022-09-15 PROCEDURE — 85730 THROMBOPLASTIN TIME PARTIAL: CPT

## 2022-09-15 PROCEDURE — 86901 BLOOD TYPING SEROLOGIC RH(D): CPT

## 2022-09-15 PROCEDURE — 36415 COLL VENOUS BLD VENIPUNCTURE: CPT

## 2022-09-15 PROCEDURE — 85610 PROTHROMBIN TIME: CPT

## 2022-09-15 PROCEDURE — 96374 THER/PROPH/DIAG INJ IV PUSH: CPT

## 2022-09-15 PROCEDURE — 83735 ASSAY OF MAGNESIUM: CPT

## 2022-09-15 PROCEDURE — 96375 TX/PRO/DX INJ NEW DRUG ADDON: CPT

## 2022-09-15 PROCEDURE — 70490 CT SOFT TISSUE NECK W/O DYE: CPT

## 2022-09-15 PROCEDURE — 71045 X-RAY EXAM CHEST 1 VIEW: CPT

## 2022-09-21 ENCOUNTER — NON-APPOINTMENT (OUTPATIENT)
Age: 82
End: 2022-09-21

## 2022-09-21 ENCOUNTER — APPOINTMENT (OUTPATIENT)
Dept: SURGERY | Facility: CLINIC | Age: 82
End: 2022-09-21

## 2022-09-21 VITALS
SYSTOLIC BLOOD PRESSURE: 140 MMHG | HEART RATE: 71 BPM | WEIGHT: 190 LBS | BODY MASS INDEX: 28.79 KG/M2 | OXYGEN SATURATION: 98 % | TEMPERATURE: 96.7 F | DIASTOLIC BLOOD PRESSURE: 72 MMHG | HEIGHT: 68 IN

## 2022-09-21 DIAGNOSIS — F32.A DEPRESSION, UNSPECIFIED: ICD-10-CM

## 2022-09-21 DIAGNOSIS — Z90.49 ACQUIRED ABSENCE OF OTHER SPECIFIED PARTS OF DIGESTIVE TRACT: ICD-10-CM

## 2022-09-21 DIAGNOSIS — K35.80 UNSPECIFIED ACUTE APPENDICITIS: ICD-10-CM

## 2022-09-21 DIAGNOSIS — J45.909 UNSPECIFIED ASTHMA, UNCOMPLICATED: ICD-10-CM

## 2022-09-21 DIAGNOSIS — K52.9 NONINFECTIVE GASTROENTERITIS AND COLITIS, UNSPECIFIED: ICD-10-CM

## 2022-09-21 DIAGNOSIS — Z82.49 FAMILY HISTORY OF ISCHEMIC HEART DISEASE AND OTHER DISEASES OF THE CIRCULATORY SYSTEM: ICD-10-CM

## 2022-09-21 DIAGNOSIS — Z92.29 PERSONAL HISTORY OF OTHER DRUG THERAPY: ICD-10-CM

## 2022-09-21 DIAGNOSIS — M19.90 UNSPECIFIED OSTEOARTHRITIS, UNSPECIFIED SITE: ICD-10-CM

## 2022-09-21 DIAGNOSIS — E07.9 DISORDER OF THYROID, UNSPECIFIED: ICD-10-CM

## 2022-09-21 DIAGNOSIS — Z78.9 OTHER SPECIFIED HEALTH STATUS: ICD-10-CM

## 2022-09-21 PROCEDURE — 99024 POSTOP FOLLOW-UP VISIT: CPT

## 2022-09-21 RX ORDER — MERCAPTOPURINE 50 MG/1
50 TABLET ORAL
Refills: 0 | Status: ACTIVE | COMMUNITY

## 2022-09-21 RX ORDER — TRAZODONE HYDROCHLORIDE 100 MG/1
100 TABLET ORAL
Refills: 0 | Status: ACTIVE | COMMUNITY

## 2022-09-21 RX ORDER — LEVOTHYROXINE SODIUM 25 UG/1
25 CAPSULE ORAL
Refills: 0 | Status: ACTIVE | COMMUNITY

## 2022-09-21 RX ORDER — LOSARTAN POTASSIUM 100 MG/1
100 TABLET, FILM COATED ORAL
Refills: 0 | Status: ACTIVE | COMMUNITY

## 2022-09-21 RX ORDER — FENOFIBRATE 145 MG/1
145 TABLET, COATED ORAL
Refills: 0 | Status: ACTIVE | COMMUNITY

## 2022-09-21 RX ORDER — METRONIDAZOLE 250 MG/1
250 TABLET, FILM COATED ORAL
Refills: 0 | Status: DISCONTINUED | COMMUNITY

## 2022-09-21 RX ORDER — TEMAZEPAM 15 MG/1
15 CAPSULE ORAL
Refills: 0 | Status: ACTIVE | COMMUNITY

## 2022-09-21 RX ORDER — CEFPODOXIME PROXETIL 100 MG
TABLET ORAL
Refills: 0 | Status: DISCONTINUED | COMMUNITY

## 2022-09-21 RX ORDER — ALBUTEROL SULFATE 2.5 MG/3ML
VIAL, NEBULIZER (ML) INHALATION
Refills: 0 | Status: ACTIVE | COMMUNITY

## 2022-09-21 RX ORDER — OMEPRAZOLE 40 MG/1
40 CAPSULE, DELAYED RELEASE ORAL
Refills: 0 | Status: ACTIVE | COMMUNITY

## 2022-09-21 RX ORDER — MESALAMINE 1000 MG/1
1000 SUPPOSITORY RECTAL
Refills: 0 | Status: ACTIVE | COMMUNITY

## 2022-09-21 RX ORDER — AMLODIPINE BESYLATE 5 MG/1
TABLET ORAL
Refills: 0 | Status: ACTIVE | COMMUNITY

## 2022-09-21 RX ORDER — MULTIVIT-MIN/IRON/FOLIC ACID/K 18-600-40
CAPSULE ORAL
Refills: 0 | Status: ACTIVE | COMMUNITY

## 2022-09-21 NOTE — PLAN
[FreeTextEntry1] : S/P uneventful general anesthesia with uncomplicated laparoscopic appendectomy.\par Mr. HAY is clinically well by this history and surgically stable by this examination.\par There is no evidence by history or examination to suggest complication.\par Mr. HAY  is cleared to resume casual activities with own comfort as guide.\par To refrain from maximal exertions for another month.\par The timing and technique of scar massage reviewed in detail with Mr. HAY .\par The Pathology report and it's benign nature was reviewed in detail.\par Mr. HAY  has been encouraged to call with questions or concerns.  \par Otherwise, RTO PRN.  He is scheduled to follow-up with Neurology on the outside.\par At his request, will ask Anesthesia to contact him regarding anesthetic agents used for this procedure.\par Mr. HAY is pleased and agrees, leaving in good spirits able to verbalize instructions as outlined above.

## 2022-09-21 NOTE — HISTORY OF PRESENT ILLNESS
[de-identified] : Very pleasant gentleman presenting to the office for scheduled General Surgery follow-up.\par Mr. Victoria is s/p recent Hospitalization for acute appendicitis.\par While he underwent an uncomplicated procedure, he experienced a post operative migraine extending his care for several days.\par Subsequent inpatient extensive imaging and neuro consultation was negative/ reassuring.\par Mr. Victoria arrives in the office today pain free and with no ongoing or associated systemic or GI complaints...

## 2022-09-21 NOTE — REVIEW OF SYSTEMS
[Feeling Poorly] : not feeling poorly [Feeling Tired] : not feeling tired [As Noted in HPI] : as noted in HPI [Negative] : Heme/Lymph

## 2022-09-21 NOTE — DATA REVIEWED
[FreeTextEntry1] : Patient:   GRETA HAY\par Accession:                             [id number]\par Collected Date/Time:                   8/28/2022 12:45 EDT\par Received Date/Time:                    8/29/2022 07:54 EDT\par Surgical Pathology Report - Auth (Verified)\par Specimen(s) Submitted\par Appendix\par Final Diagnosis\par Appendix, laparoscopic appendectomy:\par Acute, focally abscess forming, appendicitis and periappendicitis.\par Verified by: Bertha Hood MD\par (Electronic Signature)\par Reported on: 08/30/22 13:24 EDT, [address]\par Phone: [phone]   Fax: [phone]\par _________________________________________________________________

## 2022-09-21 NOTE — PHYSICAL EXAM
[Respiratory Effort] : normal respiratory effort [Normal Rate and Rhythm] : normal rate and rhythm [No HSM] : no hepatosplenomegaly [Tender] : was nontender [Enlarged] : not enlarged [No Rash or Lesion] : No rash or lesion [Alert] : alert [Oriented to Person] : oriented to person [Oriented to Place] : oriented to place [Oriented to Time] : oriented to time [Calm] : calm [de-identified] : Appears well, no acute distress, ambulates easily into office and assumes examination table without need of assistance.  [de-identified] : Normocephalic and atraumatic.  [de-identified] : Supple with full range of motion.  [FreeTextEntry1] : No cervical, supraclavicular, axillary or inguinal adenopathy.  [de-identified] : Deferred.  [de-identified] : RLQ free of visible masses.\par Lower abdomen free of palpable masses.\par Entire abdomen non tender.\par Incisions all clean and dry and intact without odor or drainage.\par No SQ collections or underlying fascial defects. [de-identified] : Normal external genitalia. [de-identified] : Deferred.  [de-identified] : Grossly symmetric and within normal limits without motor or sensory deficits.

## 2022-12-02 ENCOUNTER — NON-APPOINTMENT (OUTPATIENT)
Age: 82
End: 2022-12-02

## 2025-01-28 NOTE — H&P PST ADULT - HARM RISK FACTORS
Hospital Medicine History & Physical        Date of Service: 01/27/2025    Time of Service: 2305    Disposition:    [x]Admitted to inpatient status with expected LOS greater than two midnights due to medical therapy.  []Placed in observation status.    Historian: Information was obtained from patient and daughter , ED documentation, and use of Epic's chart review and Care Everywhere tabs    Chief Admission Complaint: Lower back and bilateral hip pain    Presenting Admission History:      Catherine Rebolledo is a/an 89 y.o. female with a significant past medical history of hypertension, hyperlipidemia, COPD, chronic lower extremity swelling, dementia, and reported chronic back and neck pain who presents to [facility]'s emergency department with complaints of significant bilateral hip and lower back pain that began last 48 hours.  Patient notes the pain was significant enough to cause her significant emotional distress and inability to perform her ADLs which she is typically independent at home.  Pain was described as a deep aching sensation, 10 out of 10, has had recent dysuria and burning with urination.  Patient denies any recent fever.  Her evaluation here included laboratory studies, EKG, chest x-ray, bilateral hip x-rays, and CT abdomen pelvis with IV contrast.  Chest x-ray was negative for any acute findings.  Bilateral hip x-rays were negative for any acute fracture or deformity.  CT abdomen pelvis did show diffuse bladder wall thickening without discrete mass, underlying with amatory infectious etiology should have been considered; bilateral renal cortical cysts, nonobstructive bowel gas pattern.  Laboratory studies reviewed and pertinent for normal electrolytes, normal renal function, normal lactic acid 1.0, troponin 15 with a repeat of 14, mildly elevated ALP at 156, WBC of 12.6.  Urinalysis showed small blood, 30 protein, small leukocyte esterase; microscopy showed greater than 100 urinary WBCs,  yes

## 2025-05-07 ENCOUNTER — NON-APPOINTMENT (OUTPATIENT)
Age: 85
End: 2025-05-07

## 2025-05-15 ENCOUNTER — NON-APPOINTMENT (OUTPATIENT)
Age: 85
End: 2025-05-15

## (undated) DEVICE — STAPLER COVIDIEN ENDO GIA STANDARD HANDLE

## (undated) DEVICE — SUT MONOCRYL 4-0 27" PS-2 UNDYED

## (undated) DEVICE — SUT POLYSORB 0 30" GU-46

## (undated) DEVICE — SPONGE ENDO PEANUT 5MM

## (undated) DEVICE — FOLEY TRAY 16FR LF URINE METER SURESTEP

## (undated) DEVICE — GLV 7.5 PROTEXIS (WHITE)

## (undated) DEVICE — SUT POLYSORB 3-0 30" V-20 UNDYED

## (undated) DEVICE — TROCAR COVIDIEN VERSAONE FIXATION CANNULA 5MM

## (undated) DEVICE — SHEARS HARMONIC ACE PLUS 7 5MM X 36CM CURVED TIP

## (undated) DEVICE — SOL IRR BAG NS 0.9% 3000ML

## (undated) DEVICE — APPLICATOR FOR ARISTA SHORT 14CM

## (undated) DEVICE — PLV/PSP-ESU FORCETRIAD T8D6139E: Type: DURABLE MEDICAL EQUIPMENT

## (undated) DEVICE — PLV-STRYKER LAPAROSCOPE 10MM 30 DEGREE: Type: DURABLE MEDICAL EQUIPMENT

## (undated) DEVICE — PACK GENERAL LAPAROSCOPY

## (undated) DEVICE — DRAPE TOWEL BLUE 17" X 24"

## (undated) DEVICE — NDL HYPO SAFE 25G X 1.5" (ORANGE)

## (undated) DEVICE — ELCTR BOVIE PENCIL SMOKE EVACUATION

## (undated) DEVICE — BLADE SCALPEL SAFETYLOCK #15

## (undated) DEVICE — TUBING STRYKER PNEUMOSURE HI FLOW INSUFFLATOR

## (undated) DEVICE — TROCAR COVIDIEN VERSAONE BLUNT TIP HASSAN 12MM

## (undated) DEVICE — D HELP - CLEARVIEW CLEARIFY SYSTEM

## (undated) DEVICE — DRSG DERMABOND 0.7ML

## (undated) DEVICE — DRAPE 3/4 SHEET W REINFORCEMENT 56X77"

## (undated) DEVICE — TROCAR COVIDIEN VERSAPORT BLADELESS OPTICAL 5MM STANDARD

## (undated) DEVICE — WARMING BLANKET UPPER ADULT

## (undated) DEVICE — PLV/PSP-SUCTION IRRIGATOR STRYKER: Type: DURABLE MEDICAL EQUIPMENT

## (undated) DEVICE — VENODYNE/SCD SLEEVE CALF MEDIUM

## (undated) DEVICE — VENODYNE/SCD SLEEVE CALF LARGE

## (undated) DEVICE — ENDOCATCH 10MM SPECIMEN POUCH

## (undated) DEVICE — SOL IRR POUR NS 0.9% 1000ML

## (undated) DEVICE — SYR LUER LOK 10CC

## (undated) DEVICE — SOL IRR POUR H2O 1000ML

## (undated) DEVICE — PLV-STRYKER LAPAROSCOPE 5MM 30 DEGREE: Type: DURABLE MEDICAL EQUIPMENT

## (undated) DEVICE — GLV 8 PROTEXIS (BLUE)

## (undated) DEVICE — ELCTR BOVIE TIP BLADE INSULATED 2.75" EDGE

## (undated) DEVICE — PLV-SCD MACHINE: Type: DURABLE MEDICAL EQUIPMENT

## (undated) DEVICE — Device